# Patient Record
Sex: FEMALE | Race: BLACK OR AFRICAN AMERICAN | NOT HISPANIC OR LATINO | Employment: OTHER | URBAN - METROPOLITAN AREA
[De-identification: names, ages, dates, MRNs, and addresses within clinical notes are randomized per-mention and may not be internally consistent; named-entity substitution may affect disease eponyms.]

---

## 2017-01-31 ENCOUNTER — HOSPITAL ENCOUNTER (OUTPATIENT)
Dept: RADIOLOGY | Facility: HOSPITAL | Age: 62
Discharge: HOME | End: 2017-01-31
Attending: ANESTHESIOLOGY
Payer: MEDICARE

## 2017-01-31 DIAGNOSIS — M53.3 SACROILIAC JOINT PAIN: ICD-10-CM

## 2017-01-31 DIAGNOSIS — M54.17 LUMBOSACRAL RADICULOPATHY: ICD-10-CM

## 2017-01-31 DIAGNOSIS — G81.91 RIGHT HEMIPARESIS: ICD-10-CM

## 2017-01-31 DIAGNOSIS — M47.816 FACET ARTHROPATHY, LUMBAR: ICD-10-CM

## 2017-01-31 PROCEDURE — 72148 MRI LUMBAR SPINE W/O DYE: CPT | Mod: TC,PO

## 2017-02-06 ENCOUNTER — TELEPHONE (OUTPATIENT)
Dept: PAIN MEDICINE | Facility: CLINIC | Age: 62
End: 2017-02-06

## 2017-02-06 DIAGNOSIS — M54.17 LUMBOSACRAL RADICULOPATHY: ICD-10-CM

## 2017-02-06 DIAGNOSIS — M53.3 SACROILIAC JOINT PAIN: Primary | ICD-10-CM

## 2017-02-06 NOTE — TELEPHONE ENCOUNTER
----- Message from Teetee Sabillon sent at 2/6/2017 11:36 AM CST -----  Contact: Nisreen fraire_  1st Request  _  2nd Request  _  3rd Request        Who: Nisreen (patient's daughter)    Why: States her mother is waiting on a call back to go over her MRI results    What Number to Call Back: patient can be reached at [phone]    When to Expect a call back: (Before the end of the day)   -- if call after 3:00 call back will be tomorrow.

## 2017-03-28 ENCOUNTER — TELEPHONE (OUTPATIENT)
Dept: PAIN MEDICINE | Facility: CLINIC | Age: 62
End: 2017-03-28

## 2017-03-28 NOTE — TELEPHONE ENCOUNTER
Patient has an appointment today at 2pm with Meghan BLAKE            ----- Message from Na English sent at 3/28/2017  1:26 PM CDT -----  _x  1st Request  _  2nd Request  _  3rd Request        Who: shama    Why: pt. Would like to know if  have an appt. Sooner than may 22,2017 please call pt. To discuss    What Number to Call Back:[phone]    When to Expect a call back: (Before the end of the day)   -- if the call is after 12:00, the call back will be tomorrow.

## 2017-04-07 ENCOUNTER — TELEPHONE (OUTPATIENT)
Dept: PAIN MEDICINE | Facility: CLINIC | Age: 62
End: 2017-04-07

## 2017-04-07 NOTE — TELEPHONE ENCOUNTER
Spoke with pt daughter Nisreen, appointment made with Dr Chen per request for 04/18/2017          ----- Message from Teetee Sabillon sent at 4/7/2017 11:13 AM CDT -----  Contact: Nisreen (daughter)  _  1st Request  _  2nd Request  _  3rd Request        Who: Nisreen (daughter)    Why: would a call back in regards to scheduling her mom a sooner appt than next avaiable on 5/30    What Number to Call Back: [phone]     When to Expect a call back: (Before the end of the day)   -- if call after 3:00 call back will be tomorrow.

## 2017-04-12 ENCOUNTER — TELEPHONE (OUTPATIENT)
Dept: PAIN MEDICINE | Facility: CLINIC | Age: 62
End: 2017-04-12

## 2017-04-12 NOTE — TELEPHONE ENCOUNTER
Spoke with Atul at [facility] to confirm that pt pain cream would be refilled. Atul stated that he would call the patient to notify of refill     Message from Yuly Lester sent at 4/12/2017 11:00 AM CDT -----  X_  1st Request  _  2nd Request  _  3rd Request        Who: Nisreen(Daughter)    Why: Pt's daughter stating she's out of her pain cream and she's in a lot of pain. She would like to know if the office received a fax that was sent over to have the cream refilled. Please call to discuss.    What Number to Call Back:[phone]    When to Expect a call back: (Before the end of the day)   -- if the call is after 12:00, the call back will be tomorrow.

## 2017-05-30 ENCOUNTER — OFFICE VISIT (OUTPATIENT)
Dept: NEUROLOGY | Facility: CLINIC | Age: 62
End: 2017-05-30
Payer: MEDICARE

## 2017-05-30 VITALS
HEART RATE: 69 BPM | HEIGHT: 64 IN | BODY MASS INDEX: 25.61 KG/M2 | DIASTOLIC BLOOD PRESSURE: 78 MMHG | SYSTOLIC BLOOD PRESSURE: 135 MMHG | WEIGHT: 150 LBS

## 2017-05-30 DIAGNOSIS — Z86.73 HISTORY OF STROKE: Primary | ICD-10-CM

## 2017-05-30 DIAGNOSIS — E78.2 MIXED HYPERLIPIDEMIA: ICD-10-CM

## 2017-05-30 DIAGNOSIS — F17.200 SMOKER: ICD-10-CM

## 2017-05-30 DIAGNOSIS — I10 ESSENTIAL HYPERTENSION: ICD-10-CM

## 2017-05-30 PROCEDURE — 99999 PR PBB SHADOW E&M-EST. PATIENT-LVL III: CPT | Mod: PBBFAC,,, | Performed by: PSYCHIATRY & NEUROLOGY

## 2017-05-30 PROCEDURE — 99214 OFFICE O/P EST MOD 30 MIN: CPT | Mod: S$GLB,,, | Performed by: PSYCHIATRY & NEUROLOGY

## 2017-05-30 NOTE — PROGRESS NOTES
"Vascular Neurology  Clinic Note    Reason For Visit (Chief Complaint): history of stroke in 2014    HPI: 62 y.o. right handed female with stroke in 2014 w/ residual R sided weakness.    Says she is in stroke clinic because in December 2014 she had a stroke. She describes it as sudden R upper and lower extremity weakness, was taken to the hospital , she was "out of it" and doesn't remember what happened next. She says she think the "stroke must have traveled up to her brain". She was not able to walk after that and went to outpatient therapy. Prior to this event she had a TIA before that about 20 years ago and one earlier than that, involving R sided weakness, but resolving in a few days. She has not had any events since this event in 2014.    [facility], states that A1c is "perfect" and cholesterol is well controlled.    Brain Imaging:  CT 2/2017 - mild white matter periventricular disease, no clear lacunar or deep infarct  Vessel Imaging:  None  Cardiac Evaluation:  TTE - 2016 - 55%, nl LA  Other:   None  Relevant Labwork:      I independently viewed the above imaging studies in addition to reviewing the report.  I reviewed the above labwork.    Review of Systems  Msk: negative for muscle pain  Skin: negative for pruritis  Neuro: + for headache, every now and then  All others negative    Past Medical History  Past Medical History:   Diagnosis Date    Anticoagulant long-term use     Diabetes mellitus, type 2     Foot drop     a couple weeks ago    Hyperlipidemia     Hypertension     Stroke 12/23/2015     Family History  No relevant history   Social History  Current Smoker 1-2 cigarettes per day     Medication List with Changes/Refills   Current Medications    ALPRAZOLAM (XANAX) 2 MG TAB    Take 1 tablet (2 mg total) by mouth 2 (two) times daily as needed. Take 1/2 tablet by mouth two times daily as needed.    BACLOFEN (LIORESAL) 10 MG TABLET    Take 1 tablet (10 mg total) by mouth 3 (three) " "times daily.    BD INSULIN PEN NEEDLE UF MINI 31 GAUGE X 3/16" NDLE        CITALOPRAM (CELEXA) 20 MG TABLET    Take 1 tablet (20 mg total) by mouth once daily.    CLOPIDOGREL (PLAVIX) 75 MG TABLET    Take 1 tablet (75 mg total) by mouth once daily.    FUROSEMIDE (LASIX) 40 MG TABLET    Take 40 mg by mouth 2 (two) times daily.    GABAPENTIN (NEURONTIN) 300 MG CAPSULE    Take 2 tablets at night for 7 days, then increase to 3 tablets at night.    HYDROCHLOROTHIAZIDE (HYDRODIURIL) 25 MG TABLET    Take 1 tablet (25 mg total) by mouth once daily.    LANTUS SOLOSTAR 100 UNIT/ML (3 ML) INPN PEN    Inject 40 Units into the skin once daily.    LOSARTAN (COZAAR) 50 MG TABLET    Take 1 tablet (50 mg total) by mouth once daily.    METOPROLOL TARTRATE (LOPRESSOR) 50 MG TABLET    Take 50 mg by mouth once daily.    OXYBUTYNIN (DITROPAN) 5 MG TAB    Take 1 tablet (5 mg total) by mouth 3 (three) times daily.    OXYCODONE-ACETAMINOPHEN (PERCOCET)  MG PER TABLET    Take 1 tablet by mouth 2 (two) times daily.    QVAR 40 MCG/ACTUATION AERO    Take 1 puff by mouth 2 (two) times daily.    SIMVASTATIN (ZOCOR) 40 MG TABLET    Take 1 tablet (40 mg total) by mouth once daily.    TRUETEST TEST STRIPS STRP       Discontinued Medications    GABAPENTIN (NEURONTIN) 300 MG CAPSULE    Take 1 capsule (300 mg total) by mouth 3 (three) times daily.       EXAM  Vital Signs:Blood pressure 135/78, pulse 69, height 5' 4" (1.626 m), weight 68 kg (150 lb).  General: well appearing without discomfort   Mental Status:alert, oriented to person - place - age - month   Language: able to name, repeat, comprehend commands   Cranial Nerves: EOMI, PERRL, no facial asymmetry, tongue to midline, palate midline  Motor: 4/5 hemiparesis of R U/LE  Sensory: intact light touch bilaterally, intact proprioception bilaterally  Coordination: no ataxia on finger-to-nose or heel-to-shin testing; no truncal ataxia  Gait & Stance: normal    NIH Stroke Scale:  Interval: " baseline (upon arrival/admit)  Level of Consciousness: 0 - alert  LOC Questions: 0 - answers both correctly  LOC Commands: 0 - performs both correctly  Best Gaze: 0 - normal  Visual: 0 - no visual loss  Facial Palsy: 1 - minor  Motor Left Arm: 0 - no drift  Motor Right Arm: 1 - drift  Motor Left Le - no drift  Motor Right Le - drift  Limb Ataxia: 0 - absent  Sensory: 0 - normal  Best Language: 0 - no aphasia  Dysarthria: 1 - mild to moderate dysarthria  Extinction and Inattention: 0 - no neglect  NIH Stroke Scale Total: 4        MoCA not performed    ___________________  ASSESSMENT & PLAN    History of stroke  Diagnostic Orders: none  · Secondary stroke prevention: Continue plavix 75 mg  · Continue current statin therapy    · Blood pressure goal <140/90 mmHg   · Stroke Risk Factors Discussed & Addressed: HTN goal BP < 140/90, HLD goal LDL <70, DM goal a1c < 7.0, smoking  · Stroke education administered    MD Nai  Vascular Neurology  Office [phone]  Fax [phone]

## 2017-06-09 ENCOUNTER — OFFICE VISIT (OUTPATIENT)
Dept: CARDIOLOGY | Facility: CLINIC | Age: 62
End: 2017-06-09
Payer: MEDICARE

## 2017-06-09 VITALS
SYSTOLIC BLOOD PRESSURE: 110 MMHG | OXYGEN SATURATION: 95 % | DIASTOLIC BLOOD PRESSURE: 60 MMHG | HEIGHT: 64 IN | HEART RATE: 82 BPM

## 2017-06-09 DIAGNOSIS — G81.91 RIGHT HEMIPARESIS: ICD-10-CM

## 2017-06-09 DIAGNOSIS — F17.200 SMOKER: ICD-10-CM

## 2017-06-09 DIAGNOSIS — E08.00 DIABETES MELLITUS DUE TO UNDERLYING CONDITION WITH HYPEROSMOLARITY WITHOUT COMA, WITH LONG-TERM CURRENT USE OF INSULIN: ICD-10-CM

## 2017-06-09 DIAGNOSIS — E78.2 MIXED HYPERLIPIDEMIA: ICD-10-CM

## 2017-06-09 DIAGNOSIS — R23.4 WOUND ESCHAR OF FOOT: ICD-10-CM

## 2017-06-09 DIAGNOSIS — Z79.4 DIABETES MELLITUS DUE TO UNDERLYING CONDITION WITH HYPEROSMOLARITY WITHOUT COMA, WITH LONG-TERM CURRENT USE OF INSULIN: ICD-10-CM

## 2017-06-09 DIAGNOSIS — Z86.73 HISTORY OF STROKE: ICD-10-CM

## 2017-06-09 DIAGNOSIS — I10 ESSENTIAL HYPERTENSION: Primary | ICD-10-CM

## 2017-06-09 PROCEDURE — 99204 OFFICE O/P NEW MOD 45 MIN: CPT | Mod: S$GLB,,, | Performed by: INTERNAL MEDICINE

## 2017-06-09 RX ORDER — LIDOCAINE AND PRILOCAINE 25; 25 MG/G; MG/G
CREAM TOPICAL
COMMUNITY
Start: 2017-06-08 | End: 2018-07-05

## 2017-06-09 NOTE — PROGRESS NOTES
Subjective:    Patient ID:  Soila Hairston is a 62 y.o. female who presents for evaluation of Consult      HPI  63 y/o female with hx of stroke with residual right sided weakness, HTN, HLD, DM, active tobacco use who presents for evaluation. She denies CP, orthopnea, PND, syncope, palps. She has chronic SOB attributed to smoking. She has right sided weakness and continues to smoke. Has right leg edema that is chronic. Has a lesion on right foot between 4th and 5th toe and is being followed by wound care.     Review of Systems   Constitution: Positive for weakness. Negative for malaise/fatigue.   HENT: Negative for congestion and headaches.    Eyes: Negative for blurred vision.   Cardiovascular: Positive for dyspnea on exertion and leg swelling. Negative for chest pain, claudication, cyanosis, irregular heartbeat, near-syncope, orthopnea, palpitations, paroxysmal nocturnal dyspnea and syncope.   Respiratory: Negative for shortness of breath.    Endocrine: Negative for polyuria.   Hematologic/Lymphatic: Negative for bleeding problem.   Skin: Negative for itching and rash.   Musculoskeletal: Positive for muscle weakness. Negative for joint swelling and muscle cramps.   Gastrointestinal: Negative for abdominal pain, hematemesis, hematochezia, melena, nausea and vomiting.   Genitourinary: Negative for dysuria and hematuria.   Neurological: Positive for disturbances in coordination and focal weakness. Negative for dizziness, light-headedness and loss of balance.   Psychiatric/Behavioral: Negative for depression. The patient is not nervous/anxious.         Objective:    Physical Exam   Constitutional: She is oriented to person, place, and time. She appears well-developed and well-nourished.   HENT:   Head: Normocephalic and atraumatic.   Neck: Neck supple. No JVD present.   Cardiovascular: Normal rate, regular rhythm and normal heart sounds.    Pulses:       Carotid pulses are 2+ on the right side, and 2+ on the left  side.       Radial pulses are 2+ on the right side, and 2+ on the left side.        Femoral pulses are 2+ on the right side, and 2+ on the left side.       Dorsalis pedis pulses are 1+ on the right side, and 2+ on the left side.        Posterior tibial pulses are 2+ on the right side, and 2+ on the left side.   Triphasic RPT doppler  Biphasic RDP doppler   Pulmonary/Chest: Effort normal and breath sounds normal.   Abdominal: Soft. Bowel sounds are normal.   Musculoskeletal: She exhibits edema.   Neurological: She is alert and oriented to person, place, and time.   Skin: Skin is warm and dry.   Psychiatric: She has a normal mood and affect. Her behavior is normal. Thought content normal.         Assessment:       1. Essential hypertension    2. Right hemiparesis    3. Diabetes mellitus due to underlying condition with hyperosmolarity without coma, with long-term current use of insulin    4. Mixed hyperlipidemia    5. History of stroke    6. Smoker    7. Wound eschar of foot        61 y/o female with hx and presentation as above. Unclear as to the reason for the clinic visit and she is unclear also. She has risk factors for CAD and is on appropriate medical management for prevention including anti-HTN meds, plavix, statin, ARB. She smells like cigarettes and we discussed the importance of smoking cessation. Discussed heart healthy diet, med compliance. Pulses evaluated in right foot and palpable and found with doppler.        Plan:       -Continue current medical regimen  -f/u PRN

## 2017-07-13 ENCOUNTER — TELEPHONE (OUTPATIENT)
Dept: ADMINISTRATIVE | Facility: OTHER | Age: 62
End: 2017-07-13

## 2017-07-14 ENCOUNTER — TELEPHONE (OUTPATIENT)
Dept: PAIN MEDICINE | Facility: CLINIC | Age: 62
End: 2017-07-14

## 2017-07-14 NOTE — TELEPHONE ENCOUNTER
Spoke with patient regarding message left staff. Patient stated that she had a message from karen. Patient was informed that, that was a call from [employer] nurse practitioner. Patient verbalized number and stated she will call them.

## 2017-07-14 NOTE — TELEPHONE ENCOUNTER
----- Message from Jacey López sent at 7/14/2017  9:00 AM CDT -----  Contact: Self [phone]  Patient Returning Your Phone Call

## 2017-07-14 NOTE — TELEPHONE ENCOUNTER
----- Message from Johanny Santiago sent at 7/14/2017  8:25 AM CDT -----  Contact: self/[phone]  She is returning the nurse call.

## 2017-08-21 ENCOUNTER — TELEPHONE (OUTPATIENT)
Dept: PAIN MEDICINE | Facility: CLINIC | Age: 62
End: 2017-08-21

## 2017-08-21 NOTE — TELEPHONE ENCOUNTER
Spoke with pt regarding appt tomorrow in [place].  Explained to pt that this appt has been moved to [facility], with CRISS Louie NP for 3PM.  Pt needs to be released from [facility] before coming to [place] with Dr. Dominguez.  Pt stated she would inform her daughter, then hung up.

## 2017-08-22 ENCOUNTER — OFFICE VISIT (OUTPATIENT)
Dept: PAIN MEDICINE | Facility: CLINIC | Age: 62
End: 2017-08-22
Payer: COMMERCIAL

## 2017-08-22 VITALS
SYSTOLIC BLOOD PRESSURE: 132 MMHG | HEART RATE: 70 BPM | TEMPERATURE: 98 F | HEIGHT: 64 IN | WEIGHT: 169.81 LBS | RESPIRATION RATE: 18 BRPM | DIASTOLIC BLOOD PRESSURE: 72 MMHG | BODY MASS INDEX: 28.99 KG/M2

## 2017-08-22 DIAGNOSIS — M51.36 DDD (DEGENERATIVE DISC DISEASE), LUMBAR: ICD-10-CM

## 2017-08-22 DIAGNOSIS — M47.816 FACET ARTHROPATHY, LUMBAR: ICD-10-CM

## 2017-08-22 DIAGNOSIS — M53.3 SACROILIAC JOINT PAIN: ICD-10-CM

## 2017-08-22 DIAGNOSIS — M48.061 LUMBAR STENOSIS: ICD-10-CM

## 2017-08-22 DIAGNOSIS — M54.17 LUMBOSACRAL RADICULOPATHY: Primary | ICD-10-CM

## 2017-08-22 PROCEDURE — 3008F BODY MASS INDEX DOCD: CPT | Mod: S$GLB,,, | Performed by: NURSE PRACTITIONER

## 2017-08-22 PROCEDURE — 99213 OFFICE O/P EST LOW 20 MIN: CPT | Mod: S$GLB,,, | Performed by: NURSE PRACTITIONER

## 2017-08-22 PROCEDURE — 99999 PR PBB SHADOW E&M-EST. PATIENT-LVL III: CPT | Mod: PBBFAC,,, | Performed by: NURSE PRACTITIONER

## 2017-08-22 PROCEDURE — 3078F DIAST BP <80 MM HG: CPT | Mod: S$GLB,,, | Performed by: NURSE PRACTITIONER

## 2017-08-22 PROCEDURE — 3075F SYST BP GE 130 - 139MM HG: CPT | Mod: S$GLB,,, | Performed by: NURSE PRACTITIONER

## 2017-08-22 NOTE — PROGRESS NOTES
Chronic Pain - Established Visit    Referring Physician: No ref. provider found    Chief Complaint:   Chief Complaint   Patient presents with    Low-back Pain        SUBJECTIVE: Disclaimer: This note has been generated using voice-recognition software. There may be typographical errors that have been missed during proof-reading    Interval History 8/22/2017:  The patient returns today for follow up of lower back and left leg pain.  We have not seen the patient since Dec 2016.  She reports that her daughter must come with her and she was busy with a new job.  Her pain radiates down the side of her left leg to her shin and anterior foot.  She denies any buttock or posterior foot pain.  She did have previously ordered lumbar MRI.  She does reports some LE weakness.  She also has a history of stroke in the past with resulting right sided weakness.  Her pain today is 8/10.  The patient denies any bowel or bladder incontinence or signs of saddle paresthesia.      Interval History 12/29/2016:  Patient arrives to clinic for a 2 week follow up after getting a Left TF KATINA L5&S1 on 11/30/16. Patient reports her pain as a 10/10 today. Patient stated that she got 90% relief from the procedure for the first 2-3 weeks andnow that pain is pain as it was before the injection. Pt reports that in the past the KATINA has provided relief for a couple of months.  Currently she is taking Percocet 10-325mg twice daily and Gabapentin 300mg at night to assist with pain. She was taking it in the morning as well but this was making her too tired. Additionally she is using a compound pain cream three times daily which is helping. The nature of her pain has not changed since her initial encounter.     Interval History 10/20/2016:  Patient's pain is unchanged and continues to radiate down the left leg. Outside records were obtained and she seems to get the most benefit from left L5 and S1 TF KATINA (up to 4 months of 75% relief). Pain is unchanged  now as back when she was receiving these injections. Patient does have chronic urinary incontinence since her CVA but no bowel incontinence. Compound cream is very helpful. Still taking percocet 10/325 TWICE DAILY which is also helpful.    Initial encounter:    Soila Hairston presents to the clinic for the evaluation of lumbar  pain. The pain started 3 years ago following a fall and symptoms have been worsening.    Brief history:  Patient has a history of lower back pain and has been seen by pain management outside of this facility and states that she was discharged from one clinic secondary to multi-sourcing opiate's which they were unaware was a violation of her current opiate contract and have left her second pain management physician secondary to wanting a second opinion and trying to avoid surgical intervention and continue doing interventional treatment options.    Pain Description:    The pain is located in the low back area and radiates to the left lower extremity.      At BEST  0/10     At WORST  10/10 on the WORST day.      On average pain is rated as 6/10.     Today the pain is rated as 7/10    The pain is described as aching, burning, numbing, sharp, shooting, throbbing and tingling      Symptoms interfere with daily activity.     Exacerbating factors: Sitting, Standing, Laying, Bending, Touching, Coughing/Sneezing, Eating, Walking, Night Time, Morning, Flexing, Lifting and Getting out of bed/chair.      Mitigating factors medications, rest, sitting and injections.     Patient denies night fever/night sweats, urinary incontinence, bowel incontinence, significant weight loss, significant motor weakness and loss of sensations.  Patient denies any suicidal or homicidal ideations    Pain Medications:  Current:  Celexa 20 mg QD  Gabapentin 300 mg TID    Tried in Past:  NSAIDs - Cannot take due to Plavix  TCA -Never  SNRI - Yes  Anti-convulsants - Gabapentin  Muscle Relaxants  -Never  Opioids-Percocet    Physical Therapy/Home Exercise: no       report:  Reviewed and consistent with medication use as prescribed.    Pain Procedures: [facility]   11/30/16 Left L5 and S1 TF KATINA- significant benefit    Chiropractor -never  Acupuncture - never  TENS unit -never  Spinal decompression -never  Joint replacement -never    Imaging:     Lumbar MRI 1/31/17    Narrative     MRI lumbar spine without contrast.01/31/17 13:19:44    History:   low back pain., left lower extremity radiculopathy and history of right lower extremity weakness secondary to CVA    Standard multiplanar noncontrast MRI sequences of the lumbar spine.    Tip of the conus is located at T12 level.  No abnormal signal in distal cord.  Small spinal canal congenital basis.  Superimposed disc protrusions at the L3-L4, L4-L5 and L5-S1 levels with significant spinal stenosis.    L1-2: Normal.    L2-3: Normal.    L3-4: Marked disc space narrowing.  Circumferential disc bulge.  Moderate central spinal stenosis.  Bilateral bony neural foraminal narrowing without evidence of definite nerve root impingement.  Facet hypertrophy.    L4-5: Marked disc space narrowing.  Circumferential disc bulge.  Moderate to severe central spinal stenosis.  Bilateral recess stenosis.  Disc herniation extends inferior to the disc space.  Bilateral bony neural foraminal narrowing with possible bilateral L4 nerve or impingement.  Bilateral facet hypertrophy.    L5-S1:  Marked disc space narrowing.  Circumferential disc bulge.  Asymmetric disc protrusion paramedian greater to the left side with narrowing of the left lateral recess greater than the right lateral recess and possible left-sided S1 nerve root impingement.  Bilateral bony neural foraminal narrowing with possible bilateral L5 nerve root impingements.   Impression         Disc space narrowing at the L3-L4 level with a circumferential disc bulge and moderate central spinal stenosis with  "bilateral degenerative changes of the facets and bilateral bony neuroforaminal narrowing.  Marked disc space narrowing at the L4-L5 level with broad-based disc protrusion and moderate to severe central spinal stenosis.  Disc herniation inferior to the disc space.  Narrowing of the lateral recesses with possible bilateral L5 nerve root impingement.  Degenerative changes of the facets.  Disc space narrowing at the L5-S1 level with a broad-based disc protrusion/disc herniation paramedian greater to the left side with possible left-sided S1 nerve root impingement.  Bilateral bony neural foraminal narrowing with evidence of possible bilateral L5 nerve root impingement in the neural foramina.         Past Medical History:   Diagnosis Date    Anticoagulant long-term use     Diabetes mellitus, type 2     Foot drop     a couple weeks ago    Hyperlipidemia     Hypertension     Stroke 12/23/2015     History reviewed. No pertinent surgical history.  Social History     Social History    Marital status:      Spouse name: N/A    Number of children: N/A    Years of education: N/A     Occupational History    Not on file.     Social History Main Topics    Smoking status: Current Some Day Smoker    Smokeless tobacco: Not on file    Alcohol use No    Drug use: No    Sexual activity: Not Currently     Other Topics Concern    Not on file     Social History Narrative    No narrative on file     History reviewed. No pertinent family history.    No Known Allergies    Current Outpatient Prescriptions   Medication Sig    alprazolam (XANAX) 2 MG Tab Take 1 tablet (2 mg total) by mouth 2 (two) times daily as needed. Take 1/2 tablet by mouth two times daily as needed.    baclofen (LIORESAL) 10 MG tablet Take 1 tablet (10 mg total) by mouth 3 (three) times daily.    BD INSULIN PEN NEEDLE UF MINI 31 gauge x 3/16" Ndle     citalopram (CELEXA) 20 MG tablet Take 1 tablet (20 mg total) by mouth once daily.    furosemide " (LASIX) 40 MG tablet Take 40 mg by mouth 2 (two) times daily.    gabapentin (NEURONTIN) 300 MG capsule Take 2 tablets at night for 7 days, then increase to 3 tablets at night.    hydrochlorothiazide (HYDRODIURIL) 25 MG tablet Take 1 tablet (25 mg total) by mouth once daily.    LANTUS SOLOSTAR 100 unit/mL (3 mL) InPn pen Inject 40 Units into the skin once daily.    lidocaine-prilocaine (EMLA) cream     losartan (COZAAR) 50 MG tablet Take 1 tablet (50 mg total) by mouth once daily.    metoprolol tartrate (LOPRESSOR) 50 MG tablet Take 50 mg by mouth once daily.    oxybutynin (DITROPAN) 5 MG Tab Take 1 tablet (5 mg total) by mouth 3 (three) times daily.    oxycodone-acetaminophen (PERCOCET)  mg per tablet Take 1 tablet by mouth 2 (two) times daily.    QVAR 40 mcg/actuation Aero Take 1 puff by mouth 2 (two) times daily.    simvastatin (ZOCOR) 40 MG tablet Take 1 tablet (40 mg total) by mouth once daily.    TRUETEST TEST STRIPS Strp     clopidogrel (PLAVIX) 75 mg tablet Take 1 tablet (75 mg total) by mouth once daily.     No current facility-administered medications for this visit.        REVIEW OF SYSTEMS:    GENERAL:  No weight loss, malaise or fevers.  HEENT:   No recent changes in vision or hearing  NECK:  Negative for lumps, no difficulty with swallowing.  RESPIRATORY:  Negative for cough, wheezing or shortness of breath, patient denies any recent URI.  CARDIOVASCULAR:  Negative for chest pain, leg swelling or palpitations.  GI:  Negative for abdominal discomfort, blood in stools or black stools or change in bowel habits.  MUSCULOSKELETAL:  See HPI.  SKIN:  Negative for lesions, rash, and itching.  PSYCH:  No mood disorder or recent psychosocial stressors.  Patients sleep is disturbed secondary to pain.  HEMATOLOGY/LYMPHOLOGY:  Negative for prolonged bleeding, bruising easily or swollen nodes.   Patient on Plavix for stroke prevention  ENDO: IDDM  NEURO:   History of CVA resulting in right-sided  "hemiparesis  All other reviewed and negative other than HPI.    OBJECTIVE:    /72   Pulse 70   Temp 97.5 °F (36.4 °C) (Oral)   Resp 18   Ht 5' 4" (1.626 m)   Wt 77 kg (169 lb 12.8 oz)   BMI 29.15 kg/m²     PHYSICAL EXAMINATION:    GENERAL: Well appearing, in no acute distress, alert and oriented x3.  PSYCH:  Mood and affect appropriate.  SKIN:  No rashes or abrasions.  HEAD/FACE:  Normocephalic, atraumatic.   CV: RRR with palpation of the radial artery.  PULM: No evidence of respiratory difficulty, symmetric chest rise.  BACK:  SLR is positive at left L4 distribution seated at 30 degrees.  There is pain with palpation over the facet joints of the lumbar spine bilaterally. There is decreased range of motion with extension to 15 degrees, and facet loading maneuvers cause reproducible pain.    EXTREMITIES: Peripheral joint ROM is full and pain free without obvious instability or laxity in all four extremities. No deformities, edema, or skin discoloration. Good capillary refill.  MUSCULOSKELETAL:    There is pain with palpation over the sacroiliac joints bilaterally.  There is pain to palpation over the greater trochanteric bursa bilaterally.  Right-sided weakness secondary to previous stroke.  5/5 strength in left ankle with plantar and 4/5 with dorsiflexion.   NEURO:   No loss of sensation is noted.  GAIT: Antalgic, patient brought in wheelchair, has right-sided weakness since CVA.    Lab Results   Component Value Date    CREATININE 1.0 03/04/2016      ASSESSMENT: 62 y.o. year old female with pain, consistent with     Encounter Diagnoses   Name Primary?    Lumbosacral radiculopathy Yes    Sacroiliac joint pain     Facet arthropathy, lumbar     DDD (degenerative disc disease), lumbar     Lumbar stenosis        PLAN:     - Previous imaging was reviewed and discussed with the patient today.    - Will scheduled for left L4 and L5 TF KATINA given current symptoms and exam.  The procedure, risks, benefits and " options were discussed with patient. There are no contraindications to the procedure. The patient expressed understanding and agreed to proceed.  Consent obtained today.    - We discussed lumbar SCS trial.  I provided her with a [employer] (formerly [employer]) DVD.    - She also inquired about surgical consult but would like to wait until after KATINA.    - Continue Gabapentin 300 mg TID.    - Dr. Chen was consulted on the patient and agrees with this plan.    - RTC 2 weeks after procedure.      The above plan and management options were discussed at length with patient. Patient is in agreement with the above and verbalized understanding.     Meghan Louie  08/22/2017

## 2017-09-13 ENCOUNTER — HOSPITAL ENCOUNTER (OUTPATIENT)
Facility: OTHER | Age: 62
Discharge: HOME | End: 2017-09-13
Attending: ANESTHESIOLOGY | Admitting: ANESTHESIOLOGY
Payer: COMMERCIAL

## 2017-09-13 ENCOUNTER — SURGERY (OUTPATIENT)
Age: 62
End: 2017-09-13

## 2017-09-13 ENCOUNTER — TELEPHONE (OUTPATIENT)
Dept: PAIN MEDICINE | Facility: CLINIC | Age: 62
End: 2017-09-13

## 2017-09-13 VITALS
RESPIRATION RATE: 18 BRPM | TEMPERATURE: 98 F | HEART RATE: 73 BPM | SYSTOLIC BLOOD PRESSURE: 137 MMHG | DIASTOLIC BLOOD PRESSURE: 73 MMHG | OXYGEN SATURATION: 95 %

## 2017-09-13 DIAGNOSIS — M54.17 LUMBOSACRAL RADICULOPATHY: Primary | ICD-10-CM

## 2017-09-13 LAB — POCT GLUCOSE: 75 MG/DL (ref 70–110)

## 2017-09-13 PROCEDURE — 99152 MOD SED SAME PHYS/QHP 5/>YRS: CPT | Mod: ,,, | Performed by: ANESTHESIOLOGY

## 2017-09-13 PROCEDURE — 25500020 PHARM REV CODE 255: Performed by: ANESTHESIOLOGY

## 2017-09-13 PROCEDURE — 63600175 PHARM REV CODE 636 W HCPCS: Performed by: ANESTHESIOLOGY

## 2017-09-13 PROCEDURE — 25000003 PHARM REV CODE 250: Performed by: ANESTHESIOLOGY

## 2017-09-13 PROCEDURE — 64484 NJX AA&/STRD TFRM EPI L/S EA: CPT | Performed by: ANESTHESIOLOGY

## 2017-09-13 PROCEDURE — 64484 NJX AA&/STRD TFRM EPI L/S EA: CPT | Mod: LT,,, | Performed by: ANESTHESIOLOGY

## 2017-09-13 PROCEDURE — 64483 NJX AA&/STRD TFRM EPI L/S 1: CPT | Mod: LT,,, | Performed by: ANESTHESIOLOGY

## 2017-09-13 PROCEDURE — 64483 NJX AA&/STRD TFRM EPI L/S 1: CPT | Performed by: ANESTHESIOLOGY

## 2017-09-13 RX ORDER — LIDOCAINE HYDROCHLORIDE 10 MG/ML
INJECTION INFILTRATION; PERINEURAL
Status: DISCONTINUED | OUTPATIENT
Start: 2017-09-13 | End: 2017-09-13 | Stop reason: HOSPADM

## 2017-09-13 RX ORDER — BUPIVACAINE HYDROCHLORIDE 2.5 MG/ML
INJECTION, SOLUTION EPIDURAL; INFILTRATION; INTRACAUDAL
Status: DISCONTINUED | OUTPATIENT
Start: 2017-09-13 | End: 2017-09-13 | Stop reason: HOSPADM

## 2017-09-13 RX ORDER — METHYLPREDNISOLONE ACETATE 40 MG/ML
INJECTION, SUSPENSION INTRA-ARTICULAR; INTRALESIONAL; INTRAMUSCULAR; SOFT TISSUE
Status: DISCONTINUED | OUTPATIENT
Start: 2017-09-13 | End: 2017-09-13 | Stop reason: HOSPADM

## 2017-09-13 RX ORDER — MIDAZOLAM HYDROCHLORIDE 1 MG/ML
INJECTION INTRAMUSCULAR; INTRAVENOUS
Status: DISCONTINUED | OUTPATIENT
Start: 2017-09-13 | End: 2017-09-13 | Stop reason: HOSPADM

## 2017-09-13 RX ORDER — SODIUM CHLORIDE 9 MG/ML
INJECTION, SOLUTION INTRAVENOUS CONTINUOUS
Status: DISCONTINUED | OUTPATIENT
Start: 2017-09-13 | End: 2017-09-13 | Stop reason: HOSPADM

## 2017-09-13 RX ADMIN — BUPIVACAINE HYDROCHLORIDE 10 ML: 2.5 INJECTION, SOLUTION EPIDURAL; INFILTRATION; INTRACAUDAL; PERINEURAL at 02:09

## 2017-09-13 RX ADMIN — MIDAZOLAM HYDROCHLORIDE 1 MG: 1 INJECTION, SOLUTION INTRAMUSCULAR; INTRAVENOUS at 02:09

## 2017-09-13 RX ADMIN — IOHEXOL 50 ML: 300 INJECTION, SOLUTION INTRAVENOUS at 02:09

## 2017-09-13 RX ADMIN — SODIUM CHLORIDE: 900 INJECTION, SOLUTION INTRAVENOUS at 01:09

## 2017-09-13 RX ADMIN — METHYLPREDNISOLONE ACETATE 80 MG: 40 INJECTION, SUSPENSION INTRA-ARTICULAR; INTRALESIONAL; INTRAMUSCULAR; SOFT TISSUE at 02:09

## 2017-09-13 RX ADMIN — LIDOCAINE HYDROCHLORIDE 10 ML: 10 INJECTION, SOLUTION INFILTRATION; PERINEURAL at 02:09

## 2017-09-13 NOTE — TELEPHONE ENCOUNTER
Staff contacted procedure area nurses and spoke with Tamara who stated that she would contact the patient's daughter

## 2017-09-13 NOTE — DISCHARGE SUMMARY
Discharge Note  Short Stay      SUMMARY     Admit Date: 9/13/2017    Attending Physician: Latisha Chen    Discharge Diagnosis: Lumbar radiculopathy [M54.16]    Discharge Physician: Latisha Chen      Discharge Date: 9/13/2017 3:41 PM       PROCEDURE:    1)  Left  L4 TRANSFORAMINAL EPIDURAL STEROID INJECTION    2)  Left  L5 TRANSFORAMINAL EPIDURAL STEROID INJECTION    Pre Procedure diagnosis:    Left  L4 and L5  Lumbar radiculopathy [M54.16]    Disposition: Home or self care    Patient Instructions:   Discharge Medication List as of 9/13/2017  2:43 PM      CONTINUE these medications which have NOT CHANGED    Details   gabapentin (NEURONTIN) 300 MG capsule Take 2 tablets at night for 7 days, then increase to 3 tablets at night., Print      hydrochlorothiazide (HYDRODIURIL) 25 MG tablet Take 1 tablet (25 mg total) by mouth once daily., Starting 5/10/2016, Until Discontinued, Normal      LANTUS SOLOSTAR 100 unit/mL (3 mL) InPn pen Inject 40 Units into the skin once daily., Starting 5/10/2016, Until Discontinued, Normal      losartan (COZAAR) 50 MG tablet Take 1 tablet (50 mg total) by mouth once daily., Starting 5/10/2016, Until Discontinued, Normal      metoprolol tartrate (LOPRESSOR) 50 MG tablet Take 50 mg by mouth once daily., Until Discontinued, Historical Med      oxybutynin (DITROPAN) 5 MG Tab Take 1 tablet (5 mg total) by mouth 3 (three) times daily., Starting 5/10/2016, Until Discontinued, Normal      oxycodone-acetaminophen (PERCOCET)  mg per tablet Take 1 tablet by mouth 2 (two) times daily., Starting 5/5/2016, Until Discontinued, Historical Med      QVAR 40 mcg/actuation Aero Take 1 puff by mouth 2 (two) times daily., Starting 2/24/2016, Until Discontinued, Historical Med      simvastatin (ZOCOR) 40 MG tablet Take 1 tablet (40 mg total) by mouth once daily., Starting 5/10/2016, Until Discontinued, Normal      alprazolam (XANAX) 2 MG Tab Take 1 tablet (2 mg total) by mouth 2 (two) times daily as  "needed. Take 1/2 tablet by mouth two times daily as needed., Starting 5/10/2016, Until Discontinued, Print      baclofen (LIORESAL) 10 MG tablet Take 1 tablet (10 mg total) by mouth 3 (three) times daily., Starting 5/10/2016, Until Discontinued, Normal      BD INSULIN PEN NEEDLE UF MINI 31 gauge x 3/16" Ndle Starting 2/19/2016, Until Discontinued, Historical Med      citalopram (CELEXA) 20 MG tablet Take 1 tablet (20 mg total) by mouth once daily., Starting 5/10/2016, Until Discontinued, Normal      clopidogrel (PLAVIX) 75 mg tablet Take 1 tablet (75 mg total) by mouth once daily., Starting Tue 5/10/2016, Until Tue 5/30/2017, Normal      furosemide (LASIX) 40 MG tablet Take 40 mg by mouth 2 (two) times daily., Until Discontinued, Historical Med      lidocaine-prilocaine (EMLA) cream Starting Thu 6/8/2017, Historical Med      TRUETEST TEST STRIPS Strp Starting 2/19/2016, Until Discontinued, Historical Med             Resume home diet and activity    "

## 2017-09-13 NOTE — H&P (VIEW-ONLY)
Chronic Pain - Established Visit    Referring Physician: No ref. provider found    Chief Complaint:   Chief Complaint   Patient presents with    Low-back Pain        SUBJECTIVE: Disclaimer: This note has been generated using voice-recognition software. There may be typographical errors that have been missed during proof-reading    Interval History 8/22/2017:  The patient returns today for follow up of lower back and left leg pain.  We have not seen the patient since Dec 2016.  She reports that her daughter must come with her and she was busy with a new job.  Her pain radiates down the side of her left leg to her shin and anterior foot.  She denies any buttock or posterior foot pain.  She did have previously ordered lumbar MRI.  She does reports some LE weakness.  She also has a history of stroke in the past with resulting right sided weakness.  Her pain today is 8/10.  The patient denies any bowel or bladder incontinence or signs of saddle paresthesia.      Interval History 12/29/2016:  Patient arrives to clinic for a 2 week follow up after getting a Left TF KATINA L5&S1 on 11/30/16. Patient reports her pain as a 10/10 today. Patient stated that she got 90% relief from the procedure for the first 2-3 weeks andnow that pain is pain as it was before the injection. Pt reports that in the past the KATINA has provided relief for a couple of months.  Currently she is taking Percocet 10-325mg twice daily and Gabapentin 300mg at night to assist with pain. She was taking it in the morning as well but this was making her too tired. Additionally she is using a compound pain cream three times daily which is helping. The nature of her pain has not changed since her initial encounter.     Interval History 10/20/2016:  Patient's pain is unchanged and continues to radiate down the left leg. Outside records were obtained and she seems to get the most benefit from left L5 and S1 TF KATINA (up to 4 months of 75% relief). Pain is unchanged  now as back when she was receiving these injections. Patient does have chronic urinary incontinence since her CVA but no bowel incontinence. Compound cream is very helpful. Still taking percocet 10/325 TWICE DAILY which is also helpful.    Initial encounter:    Soila Hairston presents to the clinic for the evaluation of lumbar  pain. The pain started 3 years ago following a fall and symptoms have been worsening.    Brief history:  Patient has a history of lower back pain and has been seen by pain management outside of this facility and states that she was discharged from one clinic secondary to multi-sourcing opiate's which they were unaware was a violation of her current opiate contract and have left her second pain management physician secondary to wanting a second opinion and trying to avoid surgical intervention and continue doing interventional treatment options.    Pain Description:    The pain is located in the low back area and radiates to the left lower extremity.      At BEST  0/10     At WORST  10/10 on the WORST day.      On average pain is rated as 6/10.     Today the pain is rated as 7/10    The pain is described as aching, burning, numbing, sharp, shooting, throbbing and tingling      Symptoms interfere with daily activity.     Exacerbating factors: Sitting, Standing, Laying, Bending, Touching, Coughing/Sneezing, Eating, Walking, Night Time, Morning, Flexing, Lifting and Getting out of bed/chair.      Mitigating factors medications, rest, sitting and injections.     Patient denies night fever/night sweats, urinary incontinence, bowel incontinence, significant weight loss, significant motor weakness and loss of sensations.  Patient denies any suicidal or homicidal ideations    Pain Medications:  Current:  Celexa 20 mg QD  Gabapentin 300 mg TID    Tried in Past:  NSAIDs - Cannot take due to Plavix  TCA -Never  SNRI - Yes  Anti-convulsants - Gabapentin  Muscle Relaxants  -Never  Opioids-Percocet    Physical Therapy/Home Exercise: no       report:  Reviewed and consistent with medication use as prescribed.    Pain Procedures: [facility]   11/30/16 Left L5 and S1 TF KATINA- significant benefit    Chiropractor -never  Acupuncture - never  TENS unit -never  Spinal decompression -never  Joint replacement -never    Imaging:     Lumbar MRI 1/31/17    Narrative     MRI lumbar spine without contrast.01/31/17 13:19:44    History:   low back pain., left lower extremity radiculopathy and history of right lower extremity weakness secondary to CVA    Standard multiplanar noncontrast MRI sequences of the lumbar spine.    Tip of the conus is located at T12 level.  No abnormal signal in distal cord.  Small spinal canal congenital basis.  Superimposed disc protrusions at the L3-L4, L4-L5 and L5-S1 levels with significant spinal stenosis.    L1-2: Normal.    L2-3: Normal.    L3-4: Marked disc space narrowing.  Circumferential disc bulge.  Moderate central spinal stenosis.  Bilateral bony neural foraminal narrowing without evidence of definite nerve root impingement.  Facet hypertrophy.    L4-5: Marked disc space narrowing.  Circumferential disc bulge.  Moderate to severe central spinal stenosis.  Bilateral recess stenosis.  Disc herniation extends inferior to the disc space.  Bilateral bony neural foraminal narrowing with possible bilateral L4 nerve or impingement.  Bilateral facet hypertrophy.    L5-S1:  Marked disc space narrowing.  Circumferential disc bulge.  Asymmetric disc protrusion paramedian greater to the left side with narrowing of the left lateral recess greater than the right lateral recess and possible left-sided S1 nerve root impingement.  Bilateral bony neural foraminal narrowing with possible bilateral L5 nerve root impingements.   Impression         Disc space narrowing at the L3-L4 level with a circumferential disc bulge and moderate central spinal stenosis with  "bilateral degenerative changes of the facets and bilateral bony neuroforaminal narrowing.  Marked disc space narrowing at the L4-L5 level with broad-based disc protrusion and moderate to severe central spinal stenosis.  Disc herniation inferior to the disc space.  Narrowing of the lateral recesses with possible bilateral L5 nerve root impingement.  Degenerative changes of the facets.  Disc space narrowing at the L5-S1 level with a broad-based disc protrusion/disc herniation paramedian greater to the left side with possible left-sided S1 nerve root impingement.  Bilateral bony neural foraminal narrowing with evidence of possible bilateral L5 nerve root impingement in the neural foramina.         Past Medical History:   Diagnosis Date    Anticoagulant long-term use     Diabetes mellitus, type 2     Foot drop     a couple weeks ago    Hyperlipidemia     Hypertension     Stroke 12/23/2015     History reviewed. No pertinent surgical history.  Social History     Social History    Marital status:      Spouse name: N/A    Number of children: N/A    Years of education: N/A     Occupational History    Not on file.     Social History Main Topics    Smoking status: Current Some Day Smoker    Smokeless tobacco: Not on file    Alcohol use No    Drug use: No    Sexual activity: Not Currently     Other Topics Concern    Not on file     Social History Narrative    No narrative on file     History reviewed. No pertinent family history.    No Known Allergies    Current Outpatient Prescriptions   Medication Sig    alprazolam (XANAX) 2 MG Tab Take 1 tablet (2 mg total) by mouth 2 (two) times daily as needed. Take 1/2 tablet by mouth two times daily as needed.    baclofen (LIORESAL) 10 MG tablet Take 1 tablet (10 mg total) by mouth 3 (three) times daily.    BD INSULIN PEN NEEDLE UF MINI 31 gauge x 3/16" Ndle     citalopram (CELEXA) 20 MG tablet Take 1 tablet (20 mg total) by mouth once daily.    furosemide " (LASIX) 40 MG tablet Take 40 mg by mouth 2 (two) times daily.    gabapentin (NEURONTIN) 300 MG capsule Take 2 tablets at night for 7 days, then increase to 3 tablets at night.    hydrochlorothiazide (HYDRODIURIL) 25 MG tablet Take 1 tablet (25 mg total) by mouth once daily.    LANTUS SOLOSTAR 100 unit/mL (3 mL) InPn pen Inject 40 Units into the skin once daily.    lidocaine-prilocaine (EMLA) cream     losartan (COZAAR) 50 MG tablet Take 1 tablet (50 mg total) by mouth once daily.    metoprolol tartrate (LOPRESSOR) 50 MG tablet Take 50 mg by mouth once daily.    oxybutynin (DITROPAN) 5 MG Tab Take 1 tablet (5 mg total) by mouth 3 (three) times daily.    oxycodone-acetaminophen (PERCOCET)  mg per tablet Take 1 tablet by mouth 2 (two) times daily.    QVAR 40 mcg/actuation Aero Take 1 puff by mouth 2 (two) times daily.    simvastatin (ZOCOR) 40 MG tablet Take 1 tablet (40 mg total) by mouth once daily.    TRUETEST TEST STRIPS Strp     clopidogrel (PLAVIX) 75 mg tablet Take 1 tablet (75 mg total) by mouth once daily.     No current facility-administered medications for this visit.        REVIEW OF SYSTEMS:    GENERAL:  No weight loss, malaise or fevers.  HEENT:   No recent changes in vision or hearing  NECK:  Negative for lumps, no difficulty with swallowing.  RESPIRATORY:  Negative for cough, wheezing or shortness of breath, patient denies any recent URI.  CARDIOVASCULAR:  Negative for chest pain, leg swelling or palpitations.  GI:  Negative for abdominal discomfort, blood in stools or black stools or change in bowel habits.  MUSCULOSKELETAL:  See HPI.  SKIN:  Negative for lesions, rash, and itching.  PSYCH:  No mood disorder or recent psychosocial stressors.  Patients sleep is disturbed secondary to pain.  HEMATOLOGY/LYMPHOLOGY:  Negative for prolonged bleeding, bruising easily or swollen nodes.   Patient on Plavix for stroke prevention  ENDO: IDDM  NEURO:   History of CVA resulting in right-sided  "hemiparesis  All other reviewed and negative other than HPI.    OBJECTIVE:    /72   Pulse 70   Temp 97.5 °F (36.4 °C) (Oral)   Resp 18   Ht 5' 4" (1.626 m)   Wt 77 kg (169 lb 12.8 oz)   BMI 29.15 kg/m²     PHYSICAL EXAMINATION:    GENERAL: Well appearing, in no acute distress, alert and oriented x3.  PSYCH:  Mood and affect appropriate.  SKIN:  No rashes or abrasions.  HEAD/FACE:  Normocephalic, atraumatic.   CV: RRR with palpation of the radial artery.  PULM: No evidence of respiratory difficulty, symmetric chest rise.  BACK:  SLR is positive at left L4 distribution seated at 30 degrees.  There is pain with palpation over the facet joints of the lumbar spine bilaterally. There is decreased range of motion with extension to 15 degrees, and facet loading maneuvers cause reproducible pain.    EXTREMITIES: Peripheral joint ROM is full and pain free without obvious instability or laxity in all four extremities. No deformities, edema, or skin discoloration. Good capillary refill.  MUSCULOSKELETAL:    There is pain with palpation over the sacroiliac joints bilaterally.  There is pain to palpation over the greater trochanteric bursa bilaterally.  Right-sided weakness secondary to previous stroke.  5/5 strength in left ankle with plantar and 4/5 with dorsiflexion.   NEURO:   No loss of sensation is noted.  GAIT: Antalgic, patient brought in wheelchair, has right-sided weakness since CVA.    Lab Results   Component Value Date    CREATININE 1.0 03/04/2016      ASSESSMENT: 62 y.o. year old female with pain, consistent with     Encounter Diagnoses   Name Primary?    Lumbosacral radiculopathy Yes    Sacroiliac joint pain     Facet arthropathy, lumbar     DDD (degenerative disc disease), lumbar     Lumbar stenosis        PLAN:     - Previous imaging was reviewed and discussed with the patient today.    - Will scheduled for left L4 and L5 TF KATINA given current symptoms and exam.  The procedure, risks, benefits and " options were discussed with patient. There are no contraindications to the procedure. The patient expressed understanding and agreed to proceed.  Consent obtained today.    - We discussed lumbar SCS trial.  I provided her with a MediWound DVD.    - She also inquired about surgical consult but would like to wait until after KATINA.    - Continue Gabapentin 300 mg TID.    - Dr. Chen was consulted on the patient and agrees with this plan.    - RTC 2 weeks after procedure.      The above plan and management options were discussed at length with patient. Patient is in agreement with the above and verbalized understanding.     Meghan Louie  08/22/2017

## 2017-09-13 NOTE — TELEPHONE ENCOUNTER
----- Message from Shavon Neff sent at 9/13/2017  8:34 AM CDT -----  Contact: pt's bianka daughter [phone]  _  1st Request  _  2nd Request  _  3rd Request        Who: pt's bianka daughter [phone]    Why: the daughter has questions regarding what medications the pt can take before injection today. Please call daughter because the pt's sugar is low.    What Number to Call Back:pt's bianka daughter [phone]    When to Expect a call back: (With in 24 hours)

## 2017-09-13 NOTE — DISCHARGE INSTRUCTIONS
Home Care Instructions Pain Management:    1. DIET:   You may resume your normal diet today.   2. BATHING:   You may shower with luke warm water.  3. DRESSING:   You may remove your bandage today.   4. ACTIVITY LEVEL:   You may resume your normal activities 24 hrs after your procedure.  5. MEDICATIONS:   You may resume your normal medications today.   6. SPECIAL INSTRUCTIONS:   No heat to the injection site for 24 hrs including, bath or shower, heating pad, moist heat, or hot tubs.    Use ice pack to injection site for any pain or discomfort.  Apply ice packs for 20 minute intervals as needed.   If you have received any sedatives by mouth today you may not drive for 12 hours.    If you have received any sedation through your IV, you may not drive for 24 hrs.     PLEASE CALL YOUR DOCTOR IF:  1. Redness or swelling around the injection site.  2. Fever of 101 degrees  3. Drainage (pus) from the injection site.  4. For any continuous bleeding (some dried blood over the incision is normal.)    FOR EMERGENCIES:   If any unusual problems or difficulties occur during clinic hours, call [phone] or [phone].   Procedural Sedation  Procedural sedation is medicine to ease discomfort, pain, and anxiety during a procedure. The medicine is often given through an intravenous (IV) line in your arm or hand. In some cases, the medicine may be taken by mouth or inhaled. While you are under sedation, you will likely be awake. But you may not remember anything afterward.  Why procedural sedation is used  Sedation is used for many types of procedures. The goal is to reduce pain, anxiety, and stressful memories of a procedure. It can also help your health care provider treat you. For example, having a broken bone fixed may be easier if you feel relaxed.  Procedural sedation is used only for short, basic procedures. It is not used for complex surgeries. Some procedures that use this type of sedation include:  · Dental surgery  · Breast  biopsy, to take a sample of breast tissue  · Endoscopy, to look at gastrointestinal problems  · Bronchoscopy, to check for lung problems  · Bone or joint realignment, to fix a broken bone or dislocated joint  · Minor foot or skin surgery  · Electrical cardioversion, to restore a normal heart rhythm  · Lumbar puncture, to assess neurological disease  Risks of procedural sedation  Procedural sedation has some risks and possible side effects, such as:  · Headache  · Nausea and vomiting  · Unpleasant memory of the procedure  · Lowered rate of breathing  · Changes in heart rate and blood pressure (rare)  · Inhalation of stomach contents into your lungs (rare)  Side effects will likely go away shortly after the procedure. Your health care team will watch your heart rate and breathing during your sedation. This is to help prevent problems.  Your own risks may vary based on your age and your overall health. They also depend on the type of sedation you are given. Talk with your health care provider about the risks that apply most to you.  Getting ready for procedural sedation  Talk with your health care provider how to get ready for your procedure. Tell him or her about all the medicines you take. This includes over-the-counter medicines such as ibuprofen. It also includes vitamins, herbs, and other supplements. You may need to stop taking some medicines before the procedure, such as blood thinners and aspirin. If you smoke, you may need to stop. Talk with your health care provider if you need help to stop smoking.  Tell your health care provider if you:  · Have had any problems in the past with sedation or anesthesia  · Have had any recent changes in your health, such as an infection or fever  · Are pregnant or think you may be pregnant  Also, make sure to:  · Ask a family member or friend to take you home after the procedure. You cannot drive on the day you receive sedation.  · Not eat or drink after midnight the night  before your procedure, if advised.  · Follow all other instructions from your health care provider.  During your procedural sedation  You may have your procedure in a hospital or a medical clinic. Sedation is done by a trained health care provider. In general, you can expect the following:  · You will be given medicine through an IV line in your arm or hand. Or you may receive a shot. The medicine may also be given by mouth. Or you may inhale it through a mask.  · If you receive medicine through an IV, you may feel the effects very quickly. You will start to feel relaxed and drowsy.  · During the procedure, your heart rate, breathing, and blood pressure will be closely watched. Your breathing and blood pressure may decrease a little. But you will likely not need help with your breathing. You may receive a little extra oxygen through a mask.  · You will probably be awake the entire time. If you do fall asleep, you should be easy to wake up, if needed. You should feel little or no pain.  · When your procedure is over, the sedative medicine will be stopped.  After your procedural sedation  You will begin to feel more awake and aware. But you will likely be drowsy for a while afterward. You will be closely watched as you become more alert. You may have a faint memory of the procedure. Or you may not remember it at all.  You should be able to return home within an hour or two after your procedure. Plan to have someone stay with you for a few hours. Side effects such as headache and nausea may go away quickly. Tell your health care provider if they continue.  Dont drive or make any important decisions for at least 24 hours. Be sure to follow all after-care instructions.      When to call your health care provider  Have someone call your health care provider right away if you have any of these:  · Drowsiness that gets worse  · Weakness or dizziness that gets worse  · Repeated vomiting  · You cant be awakened   Date Last  Reviewed: 2/6/2015  © 5394-7263 [employer] [address] [place] [address], [place] [address]. All rights reserved. This information is not intended as a substitute for professional medical care. Always follow your healthcare professional's instructions.

## 2017-09-13 NOTE — OP NOTE
INFORMED CONSENT: The procedure, risks, benefits and options were discussed with patient. There are no contraindications to the procedure. The patient expressed understanding and agreed to proceed. The personnel performing the procedure was discussed.    09/13/2017    Surgeon: Latisha Chen MD    Assistants: None    PROCEDURE:    1)  Left  L4 TRANSFORAMINAL EPIDURAL STEROID INJECTION    2)  Left  L5 TRANSFORAMINAL EPIDURAL STEROID INJECTION    Pre Procedure diagnosis:    Left  L4 and L5  Lumbar radiculopathy [M54.16]    Post-Procedure diagnosis:   same    Complications: None    Specimens: None      DESCRIPTION OF PROCEDURE: The patient was brought to the procedure room. IV access was obtained prior to the procedure. The patient was positioned prone on the fluoroscopy table. Continuous hemodynamic monitoring was initiated including blood pressure, EKG, and pulse oximetry. . The skin was prepped with chlorhexidine and draped in a sterile fashion. Skin anesthesia was achieved using a total of 10mL of lidocaine, 5mL over each respective injection site.     The  L4 and L5  transforaminal spaces were identified with fluoroscopy in the  AP, oblique, and lateral views.  A 22 gauge spinal quinke needle was then advanced into the area of the trans foraminal spaces bilaterally with confirmation of proper needle position using AP, oblique, and lateral fluoroscopic views. Once the needle tip was in the area of the transforaminal space, and there was no blood, CSF or paraesthesias,  1.5 mL of Omnipaque 300mg/ml was injected on each side for a total of 3mL.  Fluoroscopic imaging in the AP and lateral views revealed a clear outline of the spinal nerve with proximal spread of agent through the neural foramen into the epidural space. A total combination of 1 mL of Bupivicaine 0.25% and 40 mg depo medrol was injected on each side for a total of 4mL of injected medications with displacement of the contrast dye confirming that the  medication went into the area of the transforaminal spaces bilaterally. A sterile dressing was applied.   Patient tolerated the procedure well.    Conscious sedation provided by M.D    The patient was monitored with continuous pulse oximetry, EKG, and intermittent blood pressure monitors.  The patient was hemodynamically stable throughout the entire process was responsive to voice, and breathing spontaneously.  Supplemental O2 was provided at 2L/min via nasal cannula.  Patient was comfortable for the duration of the procedure. (See nurse documentation and case log for sedation time)    There was a total of 1 mg IV Midazolam was titrated for the procedure    Patient was taken back to the recovery room for further observation.     The patient was discharged to home in stable condition

## 2017-09-28 DIAGNOSIS — R05.9 COUGH: Primary | ICD-10-CM

## 2017-10-02 ENCOUNTER — OFFICE VISIT (OUTPATIENT)
Dept: PAIN MEDICINE | Facility: CLINIC | Age: 62
End: 2017-10-02
Payer: MEDICARE

## 2017-10-02 VITALS
OXYGEN SATURATION: 99 % | DIASTOLIC BLOOD PRESSURE: 72 MMHG | HEIGHT: 64 IN | WEIGHT: 171.94 LBS | RESPIRATION RATE: 18 BRPM | BODY MASS INDEX: 29.35 KG/M2 | TEMPERATURE: 99 F | HEART RATE: 76 BPM | SYSTOLIC BLOOD PRESSURE: 123 MMHG

## 2017-10-02 DIAGNOSIS — M79.641 PAIN IN RIGHT HAND: ICD-10-CM

## 2017-10-02 DIAGNOSIS — M47.816 FACET ARTHROPATHY, LUMBAR: ICD-10-CM

## 2017-10-02 DIAGNOSIS — M51.36 DDD (DEGENERATIVE DISC DISEASE), LUMBAR: ICD-10-CM

## 2017-10-02 DIAGNOSIS — M53.3 SACROILIAC JOINT PAIN: ICD-10-CM

## 2017-10-02 DIAGNOSIS — M54.17 LUMBOSACRAL RADICULOPATHY: Primary | ICD-10-CM

## 2017-10-02 PROCEDURE — 3074F SYST BP LT 130 MM HG: CPT | Mod: S$GLB,,, | Performed by: NURSE PRACTITIONER

## 2017-10-02 PROCEDURE — 3008F BODY MASS INDEX DOCD: CPT | Mod: S$GLB,,, | Performed by: NURSE PRACTITIONER

## 2017-10-02 PROCEDURE — 3078F DIAST BP <80 MM HG: CPT | Mod: S$GLB,,, | Performed by: NURSE PRACTITIONER

## 2017-10-02 PROCEDURE — 99999 PR PBB SHADOW E&M-EST. PATIENT-LVL IV: CPT | Mod: PBBFAC,,, | Performed by: NURSE PRACTITIONER

## 2017-10-02 PROCEDURE — 99213 OFFICE O/P EST LOW 20 MIN: CPT | Mod: S$GLB,,, | Performed by: NURSE PRACTITIONER

## 2017-10-02 NOTE — PROGRESS NOTES
Chronic Pain - Established Visit    Referring Physician: No ref. provider found    Chief Complaint:   Chief Complaint   Patient presents with    Low-back Pain     3 week follow up after procedure        SUBJECTIVE: Disclaimer: This note has been generated using voice-recognition software. There may be typographical errors that have been missed during proof-reading    Interval History 10/2/2017:  The patient presents today for follow up of lower back pain.  She is s/p left L4 and L5 TF KATINA with 90% pain relief.  She states that this is the best relief she has received from a procedure.  She has been able to stand and walk better since the procedure.  She is trying to get what sounds like financial assistance for physical therapy.  Her daughter is with her today and states that she notices improvement in her symptoms.  Her pain today is 0/10.      Interval History 8/22/2017:  The patient returns today for follow up of lower back and left leg pain.  We have not seen the patient since Dec 2016.  She reports that her daughter must come with her and she was busy with a new job.  Her pain radiates down the side of her left leg to her shin and anterior foot.  She denies any buttock or posterior foot pain.  She did have previously ordered lumbar MRI.  She does reports some LE weakness.  She also has a history of stroke in the past with resulting right sided weakness.  Her pain today is 8/10.  The patient denies any bowel or bladder incontinence or signs of saddle paresthesia.      Interval History 12/29/2016:  Patient arrives to clinic for a 2 week follow up after getting a Left TF KATINA L5&S1 on 11/30/16. Patient reports her pain as a 10/10 today. Patient stated that she got 90% relief from the procedure for the first 2-3 weeks andnow that pain is pain as it was before the injection. Pt reports that in the past the KATINA has provided relief for a couple of months.  Currently she is taking Percocet 10-325mg twice daily and  Gabapentin 300mg at night to assist with pain. She was taking it in the morning as well but this was making her too tired. Additionally she is using a compound pain cream three times daily which is helping. The nature of her pain has not changed since her initial encounter.     Interval History 10/20/2016:  Patient's pain is unchanged and continues to radiate down the left leg. Outside records were obtained and she seems to get the most benefit from left L5 and S1 TF KATINA (up to 4 months of 75% relief). Pain is unchanged now as back when she was receiving these injections. Patient does have chronic urinary incontinence since her CVA but no bowel incontinence. Compound cream is very helpful. Still taking percocet 10/325 TWICE DAILY which is also helpful.    Initial encounter:    Soila Hairston presents to the clinic for the evaluation of lumbar  pain. The pain started 3 years ago following a fall and symptoms have been worsening.    Brief history:  Patient has a history of lower back pain and has been seen by pain management outside of this facility and states that she was discharged from one clinic secondary to multi-sourcing opiate's which they were unaware was a violation of her current opiate contract and have left her second pain management physician secondary to wanting a second opinion and trying to avoid surgical intervention and continue doing interventional treatment options.    Pain Description:    The pain is located in the low back area and radiates to the left lower extremity.      At BEST  0/10     At WORST  10/10 on the WORST day.      On average pain is rated as 6/10.     Today the pain is rated as 7/10    The pain is described as aching, burning, numbing, sharp, shooting, throbbing and tingling      Symptoms interfere with daily activity.     Exacerbating factors: Sitting, Standing, Laying, Bending, Touching, Coughing/Sneezing, Eating, Walking, Night Time, Morning, Flexing, Lifting and Getting out of  bed/chair.      Mitigating factors medications, rest, sitting and injections.     Patient denies night fever/night sweats, urinary incontinence, bowel incontinence, significant weight loss, significant motor weakness and loss of sensations.  Patient denies any suicidal or homicidal ideations    Pain Medications:  Current:  Celexa 20 mg QD  Gabapentin 300 mg TID    Tried in Past:  NSAIDs - Cannot take due to Plavix  TCA -Never  SNRI - Yes  Anti-convulsants - Gabapentin  Muscle Relaxants -Never  Opioids-Percocet    Physical Therapy/Home Exercise: no       report:  Reviewed and consistent with medication use as prescribed.    Pain Procedures: [facility]   11/30/16 Left L5 and S1 TF KATINA- significant benefit  9/13/17 Left L4 and L5 TF KATINA- 90% relief    Chiropractor -never  Acupuncture - never  TENS unit -never  Spinal decompression -never  Joint replacement -never    Imaging:     Lumbar MRI 1/31/17    Narrative     MRI lumbar spine without contrast.01/31/17 13:19:44    History:   low back pain., left lower extremity radiculopathy and history of right lower extremity weakness secondary to CVA    Standard multiplanar noncontrast MRI sequences of the lumbar spine.    Tip of the conus is located at T12 level.  No abnormal signal in distal cord.  Small spinal canal congenital basis.  Superimposed disc protrusions at the L3-L4, L4-L5 and L5-S1 levels with significant spinal stenosis.    L1-2: Normal.    L2-3: Normal.    L3-4: Marked disc space narrowing.  Circumferential disc bulge.  Moderate central spinal stenosis.  Bilateral bony neural foraminal narrowing without evidence of definite nerve root impingement.  Facet hypertrophy.    L4-5: Marked disc space narrowing.  Circumferential disc bulge.  Moderate to severe central spinal stenosis.  Bilateral recess stenosis.  Disc herniation extends inferior to the disc space.  Bilateral bony neural foraminal narrowing with possible bilateral L4 nerve or  impingement.  Bilateral facet hypertrophy.    L5-S1:  Marked disc space narrowing.  Circumferential disc bulge.  Asymmetric disc protrusion paramedian greater to the left side with narrowing of the left lateral recess greater than the right lateral recess and possible left-sided S1 nerve root impingement.  Bilateral bony neural foraminal narrowing with possible bilateral L5 nerve root impingements.   Impression         Disc space narrowing at the L3-L4 level with a circumferential disc bulge and moderate central spinal stenosis with bilateral degenerative changes of the facets and bilateral bony neuroforaminal narrowing.  Marked disc space narrowing at the L4-L5 level with broad-based disc protrusion and moderate to severe central spinal stenosis.  Disc herniation inferior to the disc space.  Narrowing of the lateral recesses with possible bilateral L5 nerve root impingement.  Degenerative changes of the facets.  Disc space narrowing at the L5-S1 level with a broad-based disc protrusion/disc herniation paramedian greater to the left side with possible left-sided S1 nerve root impingement.  Bilateral bony neural foraminal narrowing with evidence of possible bilateral L5 nerve root impingement in the neural foramina.         Past Medical History:   Diagnosis Date    Anticoagulant long-term use     Diabetes mellitus, type 2     Foot drop     a couple weeks ago    Hyperlipidemia     Hypertension     SOB (shortness of breath)     Stroke 12/23/2015     No past surgical history on file.  Social History     Social History    Marital status:      Spouse name: N/A    Number of children: N/A    Years of education: N/A     Occupational History    Not on file.     Social History Main Topics    Smoking status: Current Some Day Smoker    Smokeless tobacco: Never Used    Alcohol use No    Drug use: No    Sexual activity: Not Currently     Other Topics Concern    Not on file     Social History Narrative     "No narrative on file     No family history on file.    No Known Allergies    Current Outpatient Prescriptions   Medication Sig    alprazolam (XANAX) 2 MG Tab Take 1 tablet (2 mg total) by mouth 2 (two) times daily as needed. Take 1/2 tablet by mouth two times daily as needed.    baclofen (LIORESAL) 10 MG tablet Take 1 tablet (10 mg total) by mouth 3 (three) times daily.    BD INSULIN PEN NEEDLE UF MINI 31 gauge x 3/16" Ndle     citalopram (CELEXA) 20 MG tablet Take 1 tablet (20 mg total) by mouth once daily.    furosemide (LASIX) 40 MG tablet Take 40 mg by mouth 2 (two) times daily.    gabapentin (NEURONTIN) 300 MG capsule Take 2 tablets at night for 7 days, then increase to 3 tablets at night.    hydrochlorothiazide (HYDRODIURIL) 25 MG tablet Take 1 tablet (25 mg total) by mouth once daily.    LANTUS SOLOSTAR 100 unit/mL (3 mL) InPn pen Inject 40 Units into the skin once daily.    lidocaine-prilocaine (EMLA) cream     losartan (COZAAR) 50 MG tablet Take 1 tablet (50 mg total) by mouth once daily.    metoprolol tartrate (LOPRESSOR) 50 MG tablet Take 50 mg by mouth once daily.    oxybutynin (DITROPAN) 5 MG Tab Take 1 tablet (5 mg total) by mouth 3 (three) times daily.    oxycodone-acetaminophen (PERCOCET)  mg per tablet Take 1 tablet by mouth 2 (two) times daily.    QVAR 40 mcg/actuation Aero Take 1 puff by mouth 2 (two) times daily.    simvastatin (ZOCOR) 40 MG tablet Take 1 tablet (40 mg total) by mouth once daily.    TRUETEST TEST STRIPS Strp     clopidogrel (PLAVIX) 75 mg tablet Take 1 tablet (75 mg total) by mouth once daily.     No current facility-administered medications for this visit.        REVIEW OF SYSTEMS:    GENERAL:  No weight loss, malaise or fevers.  HEENT:   No recent changes in vision or hearing  NECK:  Negative for lumps, no difficulty with swallowing.  RESPIRATORY:  Negative for cough, wheezing or shortness of breath, patient denies any recent URI.  CARDIOVASCULAR:  " "Negative for chest pain, leg swelling or palpitations.  GI:  Negative for abdominal discomfort, blood in stools or black stools or change in bowel habits.  MUSCULOSKELETAL:  See HPI.  SKIN:  Negative for lesions, rash, and itching.  PSYCH:  No mood disorder or recent psychosocial stressors.  Patient's sleep is disturbed secondary to pain.  HEMATOLOGY/LYMPHOLOGY:  Negative for prolonged bleeding, bruising easily or swollen nodes.   Patient on Plavix for stroke prevention  ENDO: IDDM  NEURO:   History of CVA resulting in right-sided hemiparesis  All other reviewed and negative other than HPI.    OBJECTIVE:    /72   Pulse 76   Temp 98.9 °F (37.2 °C) (Oral)   Resp 18   Ht 5' 4" (1.626 m)   Wt 78 kg (171 lb 15.3 oz)   SpO2 99%   BMI 29.52 kg/m²     PHYSICAL EXAMINATION:    GENERAL: Well appearing, in no acute distress, alert and oriented x3.  PSYCH:  Mood and affect appropriate.  SKIN:  No rashes or abrasions.  HEAD/FACE:  Normocephalic, atraumatic.   CV: RRR with palpation of the radial artery.  PULM: No evidence of respiratory difficulty, symmetric chest rise.  BACK:  SLR is negative bilaterally.  There is pain with palpation over the facet joints of the lumbar spine bilaterally. There is decreased range of motion with extension to 15 degrees, and facet loading maneuvers cause reproducible pain.    EXTREMITIES: Peripheral joint ROM is full and pain free without obvious instability or laxity in all four extremities. No deformities, edema, or skin discoloration. Good capillary refill.  MUSCULOSKELETAL:  There is pain with palpation over the sacroiliac joints bilaterally.  There is pain to palpation over the greater trochanteric bursa bilaterally.  Right-sided weakness secondary to previous stroke.  5/5 strength in left ankle with plantar and 4/5 with dorsiflexion.   NEURO:   No loss of sensation is noted.  GAIT: Antalgic, patient brought in wheelchair.    Lab Results   Component Value Date    CREATININE 1.0 " 03/04/2016      ASSESSMENT: 62 y.o. year old female with lower back and left leg pain, consistent with     Encounter Diagnoses   Name Primary?    Lumbosacral radiculopathy Yes    Facet arthropathy, lumbar     Sacroiliac joint pain     DDD (degenerative disc disease), lumbar     Pain in right hand        PLAN:     - Previous imaging was reviewed and discussed with the patient today.    - She is s/p left L4 and L5 TF KATINA with significant benefit.  Can repeat as needed.    - Continue Gabapentin 300 mg TID.    - We discussed home exercise regimen until she starts formal PT.  I provided her with an [facility] exercise booklet.    - RTC in 2 months or sooner if needed.      The above plan and management options were discussed at length with patient. Patient is in agreement with the above and verbalized understanding.     Meghan Louie  10/02/2017

## 2017-10-06 ENCOUNTER — OFFICE VISIT (OUTPATIENT)
Dept: PULMONOLOGY | Facility: CLINIC | Age: 62
End: 2017-10-06
Payer: MEDICARE

## 2017-10-06 ENCOUNTER — HOSPITAL ENCOUNTER (OUTPATIENT)
Dept: RADIOLOGY | Facility: HOSPITAL | Age: 62
Discharge: HOME | End: 2017-10-06
Attending: NURSE PRACTITIONER
Payer: MEDICARE

## 2017-10-06 ENCOUNTER — HOSPITAL ENCOUNTER (OUTPATIENT)
Dept: PULMONOLOGY | Facility: CLINIC | Age: 62
Discharge: HOME | End: 2017-10-06
Payer: MEDICARE

## 2017-10-06 ENCOUNTER — TELEPHONE (OUTPATIENT)
Dept: NEUROLOGY | Facility: CLINIC | Age: 62
End: 2017-10-06

## 2017-10-06 VITALS
TEMPERATURE: 98 F | BODY MASS INDEX: 29.53 KG/M2 | HEIGHT: 64 IN | WEIGHT: 173 LBS | DIASTOLIC BLOOD PRESSURE: 58 MMHG | HEART RATE: 79 BPM | OXYGEN SATURATION: 92 % | SYSTOLIC BLOOD PRESSURE: 118 MMHG

## 2017-10-06 DIAGNOSIS — R13.10 DYSPHAGIA, UNSPECIFIED TYPE: ICD-10-CM

## 2017-10-06 DIAGNOSIS — R05.9 COUGH: ICD-10-CM

## 2017-10-06 DIAGNOSIS — R09.82 POST-NASAL DRAINAGE: ICD-10-CM

## 2017-10-06 DIAGNOSIS — R06.09 DYSPNEA ON EXERTION: Primary | ICD-10-CM

## 2017-10-06 LAB
PRE FEV1 FVC: 81
PRE FEV1: 1.2
PRE FVC: 1.49
PREDICTED FEV1 FVC: 80
PREDICTED FEV1: 2.28
PREDICTED FVC: 2.86

## 2017-10-06 PROCEDURE — 71020 XR CHEST PA AND LATERAL: CPT | Mod: 26,,, | Performed by: RADIOLOGY

## 2017-10-06 PROCEDURE — 99999 PR PBB SHADOW E&M-EST. PATIENT-LVL IV: CPT | Mod: PBBFAC,,, | Performed by: NURSE PRACTITIONER

## 2017-10-06 PROCEDURE — 71020 XR CHEST PA AND LATERAL: CPT | Mod: TC

## 2017-10-06 PROCEDURE — 94729 DIFFUSING CAPACITY: CPT | Mod: S$GLB,,, | Performed by: INTERNAL MEDICINE

## 2017-10-06 PROCEDURE — 99214 OFFICE O/P EST MOD 30 MIN: CPT | Mod: 25,S$GLB,, | Performed by: NURSE PRACTITIONER

## 2017-10-06 PROCEDURE — 94727 GAS DIL/WSHOT DETER LNG VOL: CPT | Mod: S$GLB,,, | Performed by: INTERNAL MEDICINE

## 2017-10-06 PROCEDURE — 94010 BREATHING CAPACITY TEST: CPT | Mod: S$GLB,,, | Performed by: INTERNAL MEDICINE

## 2017-10-06 RX ORDER — LEVOCETIRIZINE DIHYDROCHLORIDE 5 MG/1
5 TABLET, FILM COATED ORAL NIGHTLY
Qty: 30 TABLET | Refills: 11 | Status: SHIPPED | OUTPATIENT
Start: 2017-10-06 | End: 2018-07-05

## 2017-10-06 RX ORDER — FLUTICASONE PROPIONATE 50 MCG
2 SPRAY, SUSPENSION (ML) NASAL DAILY
Qty: 1 BOTTLE | Refills: 3 | Status: SHIPPED | OUTPATIENT
Start: 2017-10-06 | End: 2017-12-11 | Stop reason: SDUPTHER

## 2017-10-06 NOTE — LETTER
October 6, 2017      Jc Lockhart MD  [address]  [address]  [place] [address]           [facility]  [address]  [place] [address]  Phone: [phone]          Patient: Soila Hairston   MR Number: [id number]   YOB: 1955   Date of Visit: 10/6/2017       Dear Dr. Jc Lockhart:    Thank you for referring Soila Hairston to me for evaluation. Attached you will find relevant portions of my assessment and plan of care.    If you have questions, please do not hesitate to call me. I look forward to following Soila Hairston along with you.    Sincerely,    Clarice Jack, CNS    Enclosure  CC:  No Recipients    If you would like to receive this communication electronically, please contact [email or web address] or [phone] to request more information on [employer] access.    For providers and/or their staff who would like to refer a patient to [facility], please contact us through our one-stop-shop provider referral line, Lidia Tamayo, at [phone].    If you feel you have received this communication in error or would no longer like to receive these types of communications, please e-mail [email or web address]

## 2017-10-06 NOTE — PATIENT INSTRUCTIONS
· Continue to take Anora  · Flonase 2 sprays to each nostril at bedtime  · Xyzal 1 tab every evening at bedtime  · Rinse with a salt water mixture every morning and every evening before bedtime  · Encourage to quit smoking   · CT scan of chest  · Lab work- CBC and CMP   · Follow up with GI doctor to evaluate choking with drinking

## 2017-10-06 NOTE — TELEPHONE ENCOUNTER
----- Message from Clover Aleman sent at 10/6/2017  4:06 PM CDT -----  Contact: Pt daughter Nisreen can be reached at [phone]  Nisreen is calling to schedule an appt... Pt mom has had a stroke a year ago. Please be so kind to contact daughter.        Thank you!

## 2017-10-06 NOTE — PROGRESS NOTES
"Subjective:       Patient ID: Soila Hairston is a 62 y.o. female.    Chief Complaint: Cough and wheezing  HPI   Patient is a 63 y/o female who was referred to Pulmonary by her PCP, Dr. Lockhart, for complaints of cough and wheezing. Patient denies any past history of asthma, COPD, emphysema, sarcoidosis or TB exposure. She has never been intubated or hospitalized for any respiratory issues. States that coughs minimally productive with a greenish-brown dime size globulus occurs most in the morning. Patient takes Anora daily and has noticed an improvement with her breathing. Complains of choking with drinking liquids. Unfortunately, she is a current 20 pack year smoker. Denies coughing while eating food. Reports dyspnea on exertion. + post nasal drainage. Denies fever, chills, night sweats, or hemoptysis.     Review of Systems   Constitutional: Negative for fever, chills, weight loss, appetite change and night sweats.   HENT: Positive for postnasal drip and trouble swallowing. Negative for sore throat and congestion.    Respiratory: Positive for cough, sputum production, choking, wheezing and dyspnea on extertion. Negative for hemoptysis, previous hospitialization due to pulmonary problems and asthma nighttime symptoms.    Cardiovascular: Positive for leg swelling. Negative for chest pain.   Endocrine: Positive for cold intolerance.    Musculoskeletal: Positive for back pain.   Skin: Negative for rash.   Neurological: Positive for weakness (right sided from CVA 2014).   Hematological: Excessive bruising.   Psychiatric/Behavioral: Negative for sleep disturbance.       Objective:       Vitals:    10/06/17 1257   BP: (!) 118/58   Pulse: 79   Temp: 98.3 °F (36.8 °C)   SpO2: (!) 92%   Weight: 78.5 kg (173 lb)   Height: 5' 4" (1.626 m)       Physical Exam   Constitutional: She is oriented to person, place, and time. No distress. She is obese.   HENT:   Head: Normocephalic.   Neck: Neck supple.   Cardiovascular: Normal rate, " "normal heart sounds and intact distal pulses.    Pulmonary/Chest: Normal expansion, symmetric chest wall expansion and effort normal. No respiratory distress. She has decreased breath sounds (bilaterally in bases). She has no wheezes. She has no rhonchi. She has no rales.   Abdominal: There is no guarding.   Musculoskeletal: She exhibits edema (right lower extermity).   Right sided weakness due to CVA in 2014   Neurological: She is alert and oriented to person, place, and time.   Skin: Skin is warm and dry.   Psychiatric: She has a normal mood and affect. Her behavior is normal. Judgment and thought content normal.   Vitals reviewed.    Personal Diagnostic Review    Chest x-ray reviewed 10/6/2017; lung volumes are normal. No evidence of pulmonary disease, pleural fluid, lymph node enlargement, or mass. Trachea is noted to be displaced to the right.  PFT's reviewed 10/6/2017; emily shows moderate restriction with moderate decrease TLC and DLCO. See flowsheets.   Assessment:           Outpatient Encounter Prescriptions as of 10/6/2017   Medication Sig Dispense Refill    alprazolam (XANAX) 2 MG Tab Take 1 tablet (2 mg total) by mouth 2 (two) times daily as needed. Take 1/2 tablet by mouth two times daily as needed. 28 tablet 0    baclofen (LIORESAL) 10 MG tablet Take 1 tablet (10 mg total) by mouth 3 (three) times daily. 90 tablet 3    BD INSULIN PEN NEEDLE UF MINI 31 gauge x 3/16" Ndle       citalopram (CELEXA) 20 MG tablet Take 1 tablet (20 mg total) by mouth once daily. 30 tablet 3    clopidogrel (PLAVIX) 75 mg tablet Take 1 tablet (75 mg total) by mouth once daily. 30 tablet 11    furosemide (LASIX) 40 MG tablet Take 40 mg by mouth 2 (two) times daily.      gabapentin (NEURONTIN) 300 MG capsule Take 2 tablets at night for 7 days, then increase to 3 tablets at night. 90 capsule 11    hydrochlorothiazide (HYDRODIURIL) 25 MG tablet Take 1 tablet (25 mg total) by mouth once daily. 30 tablet 3    LANTUS " SOLOSTAR 100 unit/mL (3 mL) InPn pen Inject 40 Units into the skin once daily. 1 Box 3    lidocaine-prilocaine (EMLA) cream       losartan (COZAAR) 50 MG tablet Take 1 tablet (50 mg total) by mouth once daily. 30 tablet 3    metoprolol tartrate (LOPRESSOR) 50 MG tablet Take 50 mg by mouth once daily.      oxybutynin (DITROPAN) 5 MG Tab Take 1 tablet (5 mg total) by mouth 3 (three) times daily. 90 tablet 3    oxycodone-acetaminophen (PERCOCET)  mg per tablet Take 1 tablet by mouth 2 (two) times daily.      QVAR 40 mcg/actuation Aero Take 1 puff by mouth 2 (two) times daily.      simvastatin (ZOCOR) 40 MG tablet Take 1 tablet (40 mg total) by mouth once daily. 30 tablet 3    TRUETEST TEST STRIPS Strp       fluticasone (FLONASE) 50 mcg/actuation nasal spray 2 sprays by Each Nare route once daily. 1 Bottle 3    levocetirizine (XYZAL) 5 MG tablet Take 1 tablet (5 mg total) by mouth every evening. 30 tablet 11     No facility-administered encounter medications on file as of 10/6/2017.      Problem List Items Addressed This Visit     Cough    Relevant Medications    fluticasone (FLONASE) 50 mcg/actuation nasal spray    levocetirizine (XYZAL) 5 MG tablet      Other Visit Diagnoses     Dyspnea on exertion    -  Primary    Relevant Orders    CT Chest Without Contrast    CBC auto differential    Comprehensive metabolic panel    Post-nasal drainage        Relevant Medications    fluticasone (FLONASE) 50 mcg/actuation nasal spray    levocetirizine (XYZAL) 5 MG tablet    Dysphagia, unspecified type        Relevant Orders    Ambulatory consult to Gastroenterology        Plan:   ·  Continue to take Anora  · Flonase 2 sprays to each nostril at bedtime  · Xyzal 1 tab every evening at bedtime  · Rinse with a salt water mixture every morning and every evening before bedtime  · Encourage to quit smoking  · CT scan of chest  · Lab work- CBC and CMP   · Follow up with GI doctor to evaluate choking with drinking

## 2017-10-06 NOTE — PROGRESS NOTES
"Subjective:       Patient ID: Soila Hairston is a 62 y.o. female.    Chief Complaint: No chief complaint on file.    HPI  Soila Hairston is a 62 y.o. female who presents      Review of patient's allergies indicates:  No Known Allergies  Past Medical History:   Diagnosis Date    Anticoagulant long-term use     Diabetes mellitus, type 2     Foot drop     a couple weeks ago    Hyperlipidemia     Hypertension     SOB (shortness of breath)     Stroke 12/23/2015     No past surgical history on file.  Family History     None        Social History Main Topics    Smoking status: Current Some Day Smoker     Packs/day: 0.50     Years: 40.00     Types: Cigarettes    Smokeless tobacco: Never Used    Alcohol use No    Drug use: No    Sexual activity: Not Currently       Review of Systems    Objective:      Physical Exam  Personal Diagnostic Review    No flowsheet data found.      Assessment:       1. Dyspnea on exertion    2. Cough    3. Post-nasal drainage    4. Dysphagia, unspecified type        Outpatient Encounter Prescriptions as of 10/6/2017   Medication Sig Dispense Refill    alprazolam (XANAX) 2 MG Tab Take 1 tablet (2 mg total) by mouth 2 (two) times daily as needed. Take 1/2 tablet by mouth two times daily as needed. 28 tablet 0    baclofen (LIORESAL) 10 MG tablet Take 1 tablet (10 mg total) by mouth 3 (three) times daily. 90 tablet 3    BD INSULIN PEN NEEDLE UF MINI 31 gauge x 3/16" Ndle       citalopram (CELEXA) 20 MG tablet Take 1 tablet (20 mg total) by mouth once daily. 30 tablet 3    clopidogrel (PLAVIX) 75 mg tablet Take 1 tablet (75 mg total) by mouth once daily. 30 tablet 11    furosemide (LASIX) 40 MG tablet Take 40 mg by mouth 2 (two) times daily.      gabapentin (NEURONTIN) 300 MG capsule Take 2 tablets at night for 7 days, then increase to 3 tablets at night. 90 capsule 11    hydrochlorothiazide (HYDRODIURIL) 25 MG tablet Take 1 tablet (25 mg total) by mouth once daily. 30 tablet 3    " LANTUS SOLOSTAR 100 unit/mL (3 mL) InPn pen Inject 40 Units into the skin once daily. 1 Box 3    lidocaine-prilocaine (EMLA) cream       losartan (COZAAR) 50 MG tablet Take 1 tablet (50 mg total) by mouth once daily. 30 tablet 3    metoprolol tartrate (LOPRESSOR) 50 MG tablet Take 50 mg by mouth once daily.      oxybutynin (DITROPAN) 5 MG Tab Take 1 tablet (5 mg total) by mouth 3 (three) times daily. 90 tablet 3    oxycodone-acetaminophen (PERCOCET)  mg per tablet Take 1 tablet by mouth 2 (two) times daily.      QVAR 40 mcg/actuation Aero Take 1 puff by mouth 2 (two) times daily.      simvastatin (ZOCOR) 40 MG tablet Take 1 tablet (40 mg total) by mouth once daily. 30 tablet 3    TRUETEST TEST STRIPS Strp       fluticasone (FLONASE) 50 mcg/actuation nasal spray 2 sprays by Each Nare route once daily. 1 Bottle 3    levocetirizine (XYZAL) 5 MG tablet Take 1 tablet (5 mg total) by mouth every evening. 30 tablet 11     No facility-administered encounter medications on file as of 10/6/2017.      Orders Placed This Encounter   Procedures    CT Chest Without Contrast     Standing Status:   Future     Standing Expiration Date:   10/6/2018     Order Specific Question:   May the Radiologist modify the order per protocol to meet the clinical needs of the patient?     Answer:   Yes    CBC auto differential     Standing Status:   Future     Standing Expiration Date:   12/5/2018    Comprehensive metabolic panel     Standing Status:   Future     Standing Expiration Date:   12/5/2018    Ambulatory consult to Gastroenterology     Referral Priority:   Routine     Referral Type:   Consultation     Referral Reason:   Specialty Services Required     Requested Specialty:   Gastroenterology     Number of Visits Requested:   1     Plan:       · Continue to take Anora  · Flonase 2 sprays to each nostril at bedtime  · Xyzal 1 tab every evening at bedtime  · Rinse with a salt water mixture every morning and every evening  before bedtime  · CT scan of chest  · Lab work- CBC and CMP   · Follow up with GI doctor to evaluate choking with drinking

## 2017-10-12 DIAGNOSIS — I63.9 CVA (CEREBRAL VASCULAR ACCIDENT): Primary | ICD-10-CM

## 2017-10-13 DIAGNOSIS — I63.9 CVA (CEREBRAL VASCULAR ACCIDENT): Primary | ICD-10-CM

## 2017-10-17 ENCOUNTER — CLINICAL SUPPORT (OUTPATIENT)
Dept: REHABILITATION | Facility: HOSPITAL | Age: 62
End: 2017-10-17
Payer: MEDICARE

## 2017-10-17 DIAGNOSIS — R26.2 DIFFICULTY WALKING: ICD-10-CM

## 2017-10-17 DIAGNOSIS — R53.1 RIGHT SIDED WEAKNESS: ICD-10-CM

## 2017-10-17 DIAGNOSIS — R26.89 BALANCE PROBLEMS: ICD-10-CM

## 2017-10-17 PROCEDURE — 97110 THERAPEUTIC EXERCISES: CPT | Mod: PN

## 2017-10-17 PROCEDURE — 97162 PT EVAL MOD COMPLEX 30 MIN: CPT | Mod: PN

## 2017-10-17 PROCEDURE — G8979 MOBILITY GOAL STATUS: HCPCS | Mod: CJ,PN

## 2017-10-17 PROCEDURE — G8978 MOBILITY CURRENT STATUS: HCPCS | Mod: CK,PN

## 2017-10-17 NOTE — PLAN OF CARE
TIME RECORD    Date: 10/17/17    Start Time:  1445  Stop Time:  1530    PROCEDURES:    TIMED  Procedure Min.                         UNTIMED  Procedure Min.   1 mod comp eval 45         Total Timed Minutes:  0  Total Timed Units:  0  Total Untimed Units:  1   Charges Billed/# of units:  1 mod level eval      OUTPATIENT NEUROLOGICAL REHABILITATION  PHYSICAL THERAPY EVALUATION    Onset Date: 12/ 2015 initial CVA   Primary Diagnosis:   1. Right sided weakness     2. Difficulty walking     3. Balance problems       Treatment Diagnosis:   Past Medical History:   Diagnosis Date    Anticoagulant long-term use     Diabetes mellitus, type 2     Foot drop     a couple weeks ago    Hyperlipidemia     Hypertension     SOB (shortness of breath)     Stroke 12/23/2015     Precautions: standard   Prior Therapy: HH and OP end of 2016 that was suspended due to loss of insurance   Medications: Soila Hairston has a current medication list which includes the following prescription(s): alprazolam, baclofen, bd insulin pen needle uf mini, citalopram, clopidogrel, fluticasone, furosemide, gabapentin, hydrochlorothiazide, lantus solostar, levocetirizine, lidocaine-prilocaine, losartan, metoprolol tartrate, oxybutynin, oxycodone-acetaminophen, qvar, simvastatin, and truetest test strips.  Nutrition:  Overweight  History of Present Illness: R sided weakness  Prior Level of Function: Assistive Device with RW  Social History: lives with  an dtr  Place of Residence (Steps/Adaptations): mobile home with 4 steps to enter the home, with HR  Functional Deficits Leading to Referral/Nature of Injury:  L CVA  Current functional status:  Supervision.   DME owned: RW, transport chair, tub chair  Work/Job description:  2012 and prior she as   Fall Incidence: pt has had 2 falls since CVA but none recent   Patient Therapy Goals: to be able to walk better, to move better, to be more active in the housework, to let go of that walker.        Subjective:      Pt stated: she stated that she has not been able to get therapy b/c she had not benefits   Family present/states: pt had two prior TIA.  Pain: 0/10     Objective:      - Command followin%   - Speech: mild slurring    Mental status: alert, oriented to person, place, and time  Behavior:  calm  Attention Span and Concentration:  Normal    Dominant hand:  right     Posture Alignment :slouched posture    Sensation:  Light Touch: Intact           Proprioception:   Intact    Tone: 2/4 Slight increases in muscle tone, manifested by a catch, followed by minimal resistance throughout the remainder (less than half) of the ROM.  Limbs/muscles affected: R UE/LE    Visual/Auditory: denies changes     Coordination:   - fine motor: impaired R UE  - UE coordination: impaired R UE    - LE coordination:  Mild dysmetria R LE due to tone.    ROM:   UPPER EXTREMITY--AROM/PROM  (R) UE: limited as follows: shoulder ROM impaired, see OT eval   (L) UE: WFLs           RANGE OF MOTION--LOWER EXTREMITIES  (R) LE Hip: WFL   Knee: WFL   Ankle: WFL    (L) LE: Hip: normal   Knee: normal   Ankle: normal    Upper Extremity Strength  See OT note  Lower Extremity Strength   RLE LLE   Hip Flexion: 4-/5 4+/5   Hip Extension:  2/5 3-/5   Hip Abduction: 3-/5 4+/5   Knee Extension: 4/5 4+/5   Knee Flexion: 3-/5 3+/5   Ankle Dorsiflexion: 3-/5 4+/5   Ankle Plantarflexion: 4-/5 4/5       Gait Assessment:   - AD used: RW  - Assistance: CGA  - Distance: >150  - Stairs: Leon per pt report    Gait Analysis:  Deviations noted: decreased stance on the R, decreased hip extension R, R foot drop, decreased R heel strike, decreased L step length    Impairments contributing to deviations:  R sided weakness     TINETTI BALANCE ASSESSMENT TOOL  BALANCE SECTION  1.Sitting Balance:   Steady; safe = 1  2.Rises from chair :  Able, uses arms to help = 1  3.Attempts to arise :  Able, requirese > 1 attempt = 1  4.Immediate standing Balance (first 5  "seconds) : Steady but uses walker or other support = 1  5.Standing balance: Steady but wide stance (medal heel>4" apart) & uses cane or other support = 1  6.Nudged : Steady = 2  7.Eyes closed: Steady = 1  8.Turning 360 degrees:  Steady = 1  9.Sitting Down : Unsafe (misjudged distance, falls into chair) = 0  Balance Score:      GAIT SECTION  10.Initiation of Gait (Immediately after told to go.): Any hesitancy or multiple attempts to start = 0  11.Step length and height :  Step through R=1  12.Foot Clearance :  L foot clears floor=1   13.Step symmetry: Right & Left step length not equal (estimate) = 0  14.Step continuity : Stooping or discontinuity between steps = 0  15.Path: Mild/moderate deviation or uses walking aid = 1  16.Trunk : Marked sway or uses walking aid = 0  17.Walking Time: Heels amost touching while walking = 1    Gait Score:   Balance score:  Total Score=Balance + Gait Score:  Current mobility Gcode CK 8978 56% impaired     Risk Indicators:    Tinetti Tool Score   Risk of Falls   ?18    High   19-23   Moderate   ?24   Low    TU secs   FOTO : 51% impaired  Balance Assessment:    Sitting balance (static,dynamic):WFL  Standing balance (static, dynamic):see TInetti    Functional Mobility (Bed mobility, transfers)  Bed mobility: I  Supine to sit: I  Sit to supine: I  Rolling: I  Transfers to bed: Min A  Sit to stand:  Min A  Stand pivot:  Min A  Stairs: Min A  Wheelchair mobility: N/A    Patient Education/Response:     Education provided re:role of PT, goals for PT, scheduling - pt verbalized good understanding.     Assessment:   Initial Assessment (Pertinent finding, problem list and factors affecting outcome):This is a 62 y.o. female referred to outpatient physical therapy and presents with a medical diagnosis of L CVA with R sided weakness and demonstrates limitations as described in the problem list. Due to pt's deficits, .     Rehab Potiential: good  Pt will benefit from " continuing skilled outpatient physical therapy to address the deficits listed below in the problem list, provide pt/family education and to maximize pt's level of independence in the home and community environment.     Medical necessity is demonstrated by the following IMPAIRMENTS/PROMBLEM LIST:   1. Fall Risk - impaired balance   2. Weakness R sided  3. Impaired muscle tone R side  4. Gait deviations   5. Decreased community ambulation   6. Decreased activity tolerance   7. Difficulty to participate in daily activities   8. Continued inability to participate in vocational pursuits   9. Requires skilled supervision to complete and progress HEP     Anticipated barriers to physical therapy: none      Plans and Goals:   Previous Short Term Goals Status:     Short Term Goals (6 Weeks):   1. Pt will be indep with initial HEP.   2. Pt will be able to perform TUG in 27 secs or less with least restrictive AD without LOB.  3. Pt will be able stand on beep board without AD without beep without UE support without assist for improved standing balance without AD for improved function in standing.      Long Term Goals (12 Weeks):   1. Pt will have MMT score of 4-/5 in all major ms groups in R LE. progressing  2. Patient will be able to achieve greater than or equal to 18/28 on the Tinetti using least restrictive device decreasing patient's risk for falling and improving patient to 20-40% impaired, limited, or restricted category.  Gcode CJ 8978   3. Pt will become a safe household ambulated with least restrictive device with low risk of falls and minimal gait deviations.  4. Pt will complete 6 minute walk test with 0 LOB and least restrictive device.    5. Pt perform tall kneel to 1/2 kneel to stand transitions x 5 bilaterally with UE support  6. Pt will report 41% on the FOTO Functional Assessment  indicating increased functional balance and  mobility.      Certification Period: 10/17/17 to 1/17/17  Recommended Treatment Plan:  1-2 times per week for 12 weeks:  To recieve Education, HEP, therapeutic exercises, neuromuscular re-education, therapeutic activities, manual therapy, joint mobilizations, and modalities modalities prn, ASTYM prn, kinesiotape prn, Functional Dry Needling prn modalities, ASTYM prn, kinesiotape prn, Functional Dry Needling prn to achieve established goals. Pt may be seen by PTA as part of the rehabilitation team.     Other Recommendations: OT eval and treat for RUE impairments and weakness due to CVA      Therapist: Rhnoa Singh, PT    I CERTIFY THE NEED FOR THESE SERVICES FURNISHED UNDER THIS PLAN OF TREATMENT AND WHILE UNDER MY CARE    Physician's comments: ____________________________________________________________________________________________________________________________________________    Physician's Name: ___________________________________

## 2017-10-19 PROBLEM — R26.2 DIFFICULTY WALKING: Status: ACTIVE | Noted: 2017-10-19

## 2017-10-19 PROBLEM — R53.1 RIGHT SIDED WEAKNESS: Status: ACTIVE | Noted: 2017-10-19

## 2017-10-19 PROBLEM — R26.89 BALANCE PROBLEMS: Status: ACTIVE | Noted: 2017-10-19

## 2017-10-20 ENCOUNTER — CLINICAL SUPPORT (OUTPATIENT)
Dept: REHABILITATION | Facility: HOSPITAL | Age: 62
End: 2017-10-20
Payer: MEDICARE

## 2017-10-20 DIAGNOSIS — R26.2 DIFFICULTY WALKING: ICD-10-CM

## 2017-10-20 DIAGNOSIS — R26.89 BALANCE PROBLEMS: ICD-10-CM

## 2017-10-20 DIAGNOSIS — R53.1 RIGHT SIDED WEAKNESS: ICD-10-CM

## 2017-10-20 PROCEDURE — 97110 THERAPEUTIC EXERCISES: CPT | Mod: PN

## 2017-10-20 NOTE — PROGRESS NOTES
"TIME RECORD    Date:  10/20/2017    Start Time:  4:15  Stop Time:  5:05    PROCEDURES:    TIMED  Procedure Min.   TE 50                     UNTIMED  Procedure Min.             Total Timed Minutes:  50  Total Timed Units:  3  Total Untimed Units:  0  Charges Billed/# of units:  3 TE      Progress/Current Status    Subjective:     Patient ID: Soila Hairston is a 62 y.o. female.  Diagnosis:   1. Right sided weakness     2. Difficulty walking     3. Balance problems       Pain: pt states she has R sided weakness in UE and LE. She is agreeable to PT session.     Objective:   Pt arrived to session with RW, ambulated 25 feet to low surface mat. She transferred to mat for supine therex as per logged below 1;1 w/ PTA. Pt ended session on sci fit stepper x 6 min at L2 for B LE/ UE Reciprocating ROM.   .  Date  10/20/17   VISIT 2   Gcode 2 of 10   FOTO 2  Of 5   Cap Visit   Cap Total 95.94  171.54       Balance -   Gait -   Sci fit L2 x5 min     QS -   SAQ 2x10   SLR 2x10 B   SL Abd 2x10 RTB supine   Lumbar bridges 2x10    Ashutosh Hip Add 5"x10   LAQs 2x10 B   Seated Hip Flex 2x10 B   Cybex   Leg Press -   Sit to stand  x10 trials    Hip Abd -   Hip Flex -   Hip Ext -   HS Curls -   Knee Ext -   Step Ups -   Step Downs -   Gait note   CP -   Initials JA 1/6         Assessment:     Pt tolerated session with no episodes of LOB. She responded well verbal instructions provided for sit to standing trials on low mat surface. Ambulated with verbal instruction to maintain trunk erect and head up using RW.     Patient Education/Response:     Cont HEP    Plans and Goals:   Cont PT as per POC. Advance as appropriate.  Short Term Goals (6 Weeks):   1. Pt will be indep with initial HEP.   2. Pt will be able to perform TUG in 27 secs or less with least restrictive AD without LOB.  3. Pt will be able stand on beep board without AD without beep without UE support without assist for improved standing balance without AD for improved function in " standing.      Long Term Goals (12 Weeks):   1. Pt will have MMT score of 4-/5 in all major ms groups in R LE. progressing  2. Patient will be able to achieve greater than or equal to 18/28 on the Tinetti using least restrictive device decreasing patient's risk for falling and improving patient to 20-40% impaired, limited, or restricted category.  [facility] [id number]   3. Pt will become a safe household ambulated with least restrictive device with low risk of falls and minimal gait deviations.  4. Pt will complete 6 minute walk test with 0 LOB and least restrictive device.    5. Pt perform tall kneel to 1/2 kneel to stand transitions x 5 bilaterally with UE support  6. Pt will report 41% on the FOTO Functional Assessment  indicating increased functional balance and  mobility.        Certification Period: 10/17/17 to 1/17/17

## 2017-10-24 ENCOUNTER — CLINICAL SUPPORT (OUTPATIENT)
Dept: REHABILITATION | Facility: HOSPITAL | Age: 62
End: 2017-10-24
Payer: MEDICARE

## 2017-10-24 DIAGNOSIS — R53.1 RIGHT SIDED WEAKNESS: ICD-10-CM

## 2017-10-24 DIAGNOSIS — R26.2 DIFFICULTY WALKING: ICD-10-CM

## 2017-10-24 DIAGNOSIS — R26.89 BALANCE PROBLEMS: ICD-10-CM

## 2017-10-24 DIAGNOSIS — R29.818 FINE MOTOR IMPAIRMENT: ICD-10-CM

## 2017-10-24 DIAGNOSIS — R29.898 FINE MOTOR IMPAIRMENT: ICD-10-CM

## 2017-10-24 DIAGNOSIS — M25.60 DECREASED RANGE OF MOTION: ICD-10-CM

## 2017-10-24 PROCEDURE — 97166 OT EVAL MOD COMPLEX 45 MIN: CPT | Mod: PN

## 2017-10-24 PROCEDURE — G8988 SELF CARE GOAL STATUS: HCPCS | Mod: CI,PN

## 2017-10-24 PROCEDURE — 97110 THERAPEUTIC EXERCISES: CPT | Mod: PN

## 2017-10-24 PROCEDURE — G8987 SELF CARE CURRENT STATUS: HCPCS | Mod: CK,PN

## 2017-10-24 NOTE — PROGRESS NOTES
"TIME RECORD    Date:  10/24/2017    Start Time:  4:15  Stop Time:  5:05    PROCEDURES:    TIMED  Procedure Min.   TE 50                     UNTIMED  Procedure Min.             Total Timed Minutes:  50  Total Timed Units:  3  Total Untimed Units:  0  Charges Billed/# of units:  3 TE      Progress/Current Status    Subjective:     Patient ID: Soila Hairston is a 62 y.o. female.  Diagnosis:   1. Right sided weakness     2. Difficulty walking     3. Balance problems       Pain: Pt reports no pain, and just finished OT evaluation.    Objective:   Pt arrived to session with RW after OT eval, ambulated 30 feet to sci fit stepper x 5 min at L2 for B LE/ UE Reciprocating ROM f/b 20' to mat surface for sitting and supine TE x50.   .  Date  10/24/17 10/20/17   VISIT 3 2   Gcode 3/10 2 of 10   FOTO 3/5 2  Of 5   Cap Visit   Cap Total 95.94 95.94  171.54        Balance -- -   Gait See note -   Sci fit L2 x5 mins L2 x5 min     QS -- -   SAQ 2x10 B 2x10   SLR 2x10 B 2x10 B   SL Abd 2x10 RTB  supine 2x10 RTB supine   Lumbar bridges 2x10 2x10    Ashutosh Hip Add 5''x10 5"x10   LAQs 2x10 B 2x10 B   Seated Hip Flex oot 2x10 B   Cybex   Leg Press -- -   Sit to stand  x10 trials   x10 trials    Hip Abd -- -   Hip Flex -- -   Hip Ext -- -   HS Curls -- -   Knee Ext -- -   Step Ups -- -   Step Downs -- -   Gait See note note   CP -- -   Initials YULIYA DYER 1/6     Assessment:     Pt ambulated at [facility] with min inconsistent R toe scuffing at times; no LOB. Pt demo increased fatigue after stepper and exercises, and requires min A during supine hip abd for increased AROM. Pt given mod TC/VC during STS for full B hip and knee extension.    Patient Education/Response:     Cont HEP    Plans and Goals:   Cont PT as per POC. Advance as appropriate.  Short Term Goals (6 Weeks):   1. Pt will be indep with initial HEP.   2. Pt will be able to perform TUG in 27 secs or less with least restrictive AD without LOB.  3. Pt will be able stand on beep board " without AD without beep without UE support without assist for improved standing balance without AD for improved function in standing.      Long Term Goals (12 Weeks):   1. Pt will have MMT score of 4-/5 in all major ms groups in R LE. progressing  2. Patient will be able to achieve greater than or equal to 18/28 on the Tinetti using least restrictive device decreasing patient's risk for falling and improving patient to 20-40% impaired, limited, or restricted category.  [facility] [place] CJ [id number]   3. Pt will become a safe household ambulated with least restrictive device with low risk of falls and minimal gait deviations.  4. Pt will complete 6 minute walk test with 0 LOB and least restrictive device.    5. Pt perform tall kneel to 1/2 kneel to stand transitions x 5 bilaterally with UE support  6. Pt will report 41% on the FOTO Functional Assessment  indicating increased functional balance and  mobility.        Certification Period: 10/17/17 to 1/17/17

## 2017-10-24 NOTE — PLAN OF CARE
"  TIME RECORD    Date: 10/24/2017    Start Time:  3:40pm  Stop Time:  4:10pm    PROCEDURES:    TIMED  Procedure Min.                 UNTIMED  Procedure Min.   1 mod eval 30         Total Timed Minutes:  0  Total Timed Units:  0  Total Untimed Units:  1  Charges Billed/# of units:  1 mod eval    Visit #: 1  FOTO last administered: 10/24/2017 (initial evaluation)    Medicare charges:  Today's amount: $79.69  Total amount: $79.69    OCCUPATIONAL THERAPY INITIAL EVALUATION & PLAN OF TREATMENT    Patient Name: Soila Pedrofarhad  Physician Name:  Dr. Jc Lockhart MD  Primary Diagnosis:  CVA, R lópez  Treatment Diagnosis:  RUE   Onset Date:  12/23/14  Eval Date:  10/24/2017   Certification Period:  10/24/2017  to 12/24/17  Past Medical History:   Past Medical History:   Diagnosis Date    Anticoagulant long-term use     Diabetes mellitus, type 2     Foot drop     a couple weeks ago    Hyperlipidemia     Hypertension     SOB (shortness of breath)     Stroke 12/23/2015     Precautions:  Universal precuations.  Prior Therapy:  Pt went to [facility] had in patient therapy and home health following stroke. Pt went to [facility] PT "a while ago". Pt has been participating in PT for a few sessions currently.  Signs of Abuse: no  Medications: Soila Hairston has a current medication list which includes the following prescription(s): alprazolam, baclofen, bd insulin pen needle uf mini, citalopram, clopidogrel, fluticasone, furosemide, gabapentin, hydrochlorothiazide, lantus solostar, levocetirizine, lidocaine-prilocaine, losartan, metoprolol tartrate, oxybutynin, oxycodone-acetaminophen, qvar, simvastatin, and truetest test strips.  Nutrition:  Overweight female.  Prior Level of Function: Independent  Social History:  Pt lives at home with  and daughter (25-year-old). Pt was working prior to CVA. Currently, spends time at home. Pt spends time with family during the week.  Functional Deficits Leading to " "Referral/Nature of Injury:  Pt was home with cousins during holiday season. Pt noticed leg was feeling weak. Pt reported family members called 911 and pt lost memory of the situation.   Patient Therapy Goals:  To get more movement and use in her right hand.  Hand dominance: Right  X-Rays/Tests: see images as needed.      Subjective:     Pain: 6/10 in pain of R SF  "I can't hold my pinky straight. The other hand works."    Objective:     Cognitive Exam  Oriented: Person, Place, Time and Situation  Behaviors: Cooperative  Follows Commands/attention: Follows one-step commands  Communication: pt with decreased enunciation of words.  Memory: No Deficits noted - per pt report. Continue to assess. "A lot of times I forget things that happened years ago."  Safety awareness/insight to disability: Pt with fair insight into disabilities.  Coping skills/emotional control: Appropriate to situation    Visual/perceptual:  Tracking: intact  Saccades: intact  Acuity: intact  R/L discrimination: intact  Visual field: intact  Motor Planning Praxis: intact  Comments:     Physical Exam:    Postural examination/scapula alignment: Rounded shoulder and Slouched posture  Joint integrity: no impairments noted  Skin integrity: no impairments noted  Edema: no impairments noted    Palpation: no noted impairments.    Sensation: Soila reports no impairments    Range of Motion:     Shoulder  Right   Left  Pain/Dysfunction with Movement    AROM PROM MMT AROM PROM MMT    flexion 92 Not tested Not tested WNL WNL WNL    extension Not tested Not tested Not tested WNL WNL WNL    abduction 65 Not tested Not tested WNL WNL WNL    adduction At side Not tested Not tested WNL WNL WNL    Internal rotation Not tested Not tested Not tested WNL WNL WNL    ER at 90° abd Not tested Not tested Not tested WNL WNL WNL    ER at 0° abd Not tested Not tested Not tested WNL WNL WNL    Elbow Flex 128 Not tested Not tested WNL WNL WNL    Elbow Ext -60 -60 Not tested WNL " WNL WNL Pt with contracture of R elbow   Pronation Not tested Not tested Not tested WNL WNL WNL    Supination Not tested Not tested Not tested WNL WNL WNL    Wrist Flex Not tested Not tested Not tested WNL WNL WNL    Wrist Ext Not tested Not tested Not tested WNL WNL WNL    RD Not tested Not tested Not tested WNL WNL WNL    UD Not tested Not tested Not tested WNL WNL WNL    Functional fist: able  Opposition to all digits: discoordinated    Comments:   Hard end feel       and Pinch Strengths (in pounds):  Setting 2   Right Left   Elbow bent     1 16 46   2 19 45   3 16 52   Average 17 47.7        Lateral 1 9   Tripod 1 10   Tip 1 9     Comments:       Fine motor coordination:  Assess in future session    Gross motor coordination:  Assess in future session    Tone:  Modified Skylar Scale:   2 More marked increase in muscle tone through most of the ROM, but affected part(s) easily moved - elbow contracted    Comments:     Functional Mobility:  Bed mobility: I  Roll to left: I  Roll to right: I  Supine to sit: I  Sit to supine: Min A  Transfers to bed: Mod I  Transfers to toilet: Mod I  Car transfers: Mod I  Wheelchair mobility: N/A    ADL's:  Feeding: Min A  Grooming: Min A  Hygiene: Min A  UB Dressing: Min A  LB Dressing: Min A  Toileting: Min A  Bathing: Min A    IADL's:  All IADLs are deferred to family members.    FOTO subjective score: Patient scored 45% able/55% impaired on stroke IS hand function survey, demonstrating Pt's functional ability with upper extremity.     Treatment included: OT evaluation completed  Assessment:     This 62 y.o. female referred to Outpatient Occupational Therapy with diagnosis of   Encounter Diagnoses   Name Primary?    Fine motor impairment     Decreased range of motion      . presents with limitations as described in problem list. Patient can benefit from Occupational Therapy services for moist heat, PROM, AAROM, AROM, NMR, Theraputic exercises, joint mobs, home exercise  program provided with written instructions, strengthening, orthotics custom fabrication and fit/train as needed, and Theraband Ex. The following goals were discussed with the patient and she is in agreement with them as to be addressed in the treatment plan.     Problem List:   Decreased function of Right UE, Decreased ROM, Increased pain, Decreased strength, Hypomobility, Muscular atrophy, Inability to perform work/tasks, Inability to perform leisure activiites and Inability to perform self care tasks    Profile and History Assessment of Occupational Performance Level of Clinical Decision Making Complexity Score   Occupational Profile:   Soila Hairston is a 62 y.o. female who lives with their spouse and lives with their daughter, currently not working. Pt spends majority of time in house. Pt goal is to increase use of her right hand.    Comorbidities:   coping style/mechanism, high BMI, level of undertstanding of current condition and hx/o hypertension    Medical and Therapy History Review:   Expanded     Moderate               Performance Deficits    Physical:  Joint Mobility  Joint Stability  Muscle Power/Strength  Muscle Endurance  Control of Voluntary Movement   Strength  Pinch Strength  Gross Motor Coordination  Fine Motor Coordination  Proprioception Functions  Muscle Tone  Postural Control    Cognitive:  Attention  Initiation    Psychosocial:    Habits  Routines  pt currently not working and has deferred IADLs to family members.    5+, high Modification/Need for Assistance:  Minimal-Moderate Modifications/Assistance    Intervention Selection:  Several Treatment Options, including NMR, MT, TA, TE, self-care/ADLs, functional mobility,     Moderate   moderate  Based on PMHX, co morbidities , data from assessments and functional level of assistance required with task and clinical presentation directly impacting function.           Patient Education/Response:     HEP: continue with PT exercises  currently.    Plans and Goals:     Rehab Potential: good    Goals to be met in 4 weeks: (11/24/17)  1) Initiate Hep   2) Pt to tolerate all AROM measurements.  3) Pt to take FM and GM coordination assessment.  4) Pt to increase lateral pinch strength to 3 psi, for ADLs of using knife to cut during meals.  5) Patient will be able to achieve less than or equal to 30% impaired on the FOTO, demonstrating overall improved functional ability with upper extremity. (self-care category)    Goals to be met by discharge:  1) Independent with HEP  2) Pt to increase AROM of shoulder flexion to 120 degrees without compensation of upper traps, and demonstrating extended elbow for overhead reaching activities.  3) Pt to increase AROM of shoulder abduction with coordinated elbow extension to 110 degrees for dressing activities.  4) Pt to increase  strength of R hand to 30 psi, demonstrating improvements of strength in R hand, to use for IADLs such as cooking.  5) Patient will be able to achieve less than or equal to 19% impaired on the FOTO, demonstrating overall improved functional ability with upper extremity. (self-care category)    Recommended Treatment Plan (2-3 times per week for 8 weeks): Therapeutic Exercise, Functional Activities, Patient Education, Home Exercise Program, ADL Training, Electrical Stimulation/TENS/Interferential, Moist Heat/Ice, Sensory/Neuromuscular Reeducation, Cognitive Perceptual Retraining and Manual Therapy  Other Recommendations:  Custom fabrication of orthotics with fit/train as needed.     Therapist's Name: SAMI Ross/LISET   Date: 10/24/2017    I CERTIFY THE NEED FOR THESE SERVICES FURNISHED UNDER THIS PLAN OF TREATMENT AND WHILE UNDER MY CARE    Physician's comments: ________________________________________________________________________________________________________________________________________________      Physician's Name: ___________________________________

## 2017-10-31 ENCOUNTER — CLINICAL SUPPORT (OUTPATIENT)
Dept: REHABILITATION | Facility: HOSPITAL | Age: 62
End: 2017-10-31
Payer: MEDICARE

## 2017-10-31 DIAGNOSIS — R53.1 RIGHT SIDED WEAKNESS: ICD-10-CM

## 2017-10-31 DIAGNOSIS — R26.89 BALANCE PROBLEMS: ICD-10-CM

## 2017-10-31 DIAGNOSIS — R26.2 DIFFICULTY WALKING: ICD-10-CM

## 2017-10-31 PROCEDURE — 97110 THERAPEUTIC EXERCISES: CPT | Mod: PN

## 2017-10-31 NOTE — PROGRESS NOTES
"TIME RECORD    Date:  10/31/2017    Start Time: 1:15  Stop Time:  2:05    PROCEDURES:    TIMED  Procedure Min.   TE 50                     UNTIMED  Procedure Min.             Total Timed Minutes:  50  Total Timed Units:  3  Total Untimed Units:  0  Charges Billed/# of units:  3 TE      Progress/Current Status    Subjective:     Patient ID: Soila Hairston is a 62 y.o. female.  Diagnosis:   1. Right sided weakness     2. Difficulty walking     3. Balance problems       Pain: pt agreeable to PT session. She states she feels tired today but wants to work.     Objective:   Pt arrived to session with RW on level surface, slow linda noted. Pt then positioned in supine for B LE therex as per logged below 1:1 w/ PTA. Pt then ambulated 30 ft to sci fit stepper for B UE/ LE reciprocal AROM x 8 min.   .  Date  10/31/17 10/24/17 10/20/17   VISIT 4 3 2   Gcode 4/10 3/10 2 of 10   FOTO 4/5 3/5 2  Of 5   Cap Visit   Cap Total 95.94  363.42 95.94 95.94  171.54         Balance  -- -   Gait  See note -   Sci fit L2 x6  Min  L2 x5 mins L2 x5 min     QS  -- -   SAQ 1# 2x10 B 2x10 B 2x10   SLR 1# 2x10 B 2x10 B 2x10 B   SL Abd 2x10 RTB  supine 2x10 RTB  supine 2x10 RTB supine   Lumbar bridges 2x10 2x10 2x10    Ashutosh Hip Add 5''x10 5''x10 5"x10   LAQs  2x10 B 2x10 B   Seated Hip Flex 2x10 B oot 2x10 B   Cybex   Leg Press  -- -   Sit to stand  x10 trials x10 trials   x10 trials    Hip Abd  -- -   Hip Flex  -- -   Hip Ext  -- -   HS Curls  -- -   Knee Ext  -- -   Step Ups  -- -   Step Downs  -- -   Gait  See note note   CP  -- -   Initials GOMEZ 1/6 YULIYA DYER 1/6     Assessment:     Pt able to complete session with minor reports of general fatigue post session. She ambulates with slow linda and no effective toe off/ heel strike noted. Verbal instructions provided on technique and safety while ambulating on level surfaces.     Patient Education/Response:     Cont HEP    Plans and Goals:   Cont PT as per POC. Advance as appropriate.  Short Term " Goals (6 Weeks):   1. Pt will be indep with initial HEP.   2. Pt will be able to perform TUG in 27 secs or less with least restrictive AD without LOB.  3. Pt will be able stand on beep board without AD without beep without UE support without assist for improved standing balance without AD for improved function in standing.      Long Term Goals (12 Weeks):   1. Pt will have MMT score of 4-/5 in all major ms groups in R LE. progressing  2. Patient will be able to achieve greater than or equal to 18/28 on the Tinetti using least restrictive device decreasing patient's risk for falling and improving patient to 20-40% impaired, limited, or restricted category.  [facility] [id number]   3. Pt will become a safe household ambulated with least restrictive device with low risk of falls and minimal gait deviations.  4. Pt will complete 6 minute walk test with 0 LOB and least restrictive device.    5. Pt perform tall kneel to 1/2 kneel to stand transitions x 5 bilaterally with UE support  6. Pt will report 41% on the FOTO Functional Assessment  indicating increased functional balance and  mobility.        Certification Period: 10/17/17 to 1/17/17

## 2017-11-02 ENCOUNTER — CLINICAL SUPPORT (OUTPATIENT)
Dept: REHABILITATION | Facility: HOSPITAL | Age: 62
End: 2017-11-02
Payer: MEDICARE

## 2017-11-02 DIAGNOSIS — R53.1 RIGHT SIDED WEAKNESS: ICD-10-CM

## 2017-11-02 DIAGNOSIS — R26.89 BALANCE PROBLEMS: ICD-10-CM

## 2017-11-02 DIAGNOSIS — R26.2 DIFFICULTY WALKING: ICD-10-CM

## 2017-11-02 PROCEDURE — 97110 THERAPEUTIC EXERCISES: CPT | Mod: PN

## 2017-11-02 NOTE — PROGRESS NOTES
"TIME RECORD    Date:  11/02/2017    Start Time: 2:45  Stop Time:  3:35     PROCEDURES:    TIMED  Procedure Min.   TE 50                     UNTIMED  Procedure Min.             Total Timed Minutes:  50  Total Timed Units:  3  Total Untimed Units:  0  Charges Billed/# of units:  3 TE      Progress/Current Status    Subjective:     Patient ID: Soila Hairston is a 62 y.o. female.  Diagnosis:   1. Right sided weakness     2. Difficulty walking     3. Balance problems       Pain:    Pt arrived to therapy with walker and states "feels good today". Pt was quite and not talkative. Pt appeared unenergetic and fatigue ambulating to mat.    Objective:   Pt arrived with rolling walker and ambulated slow to the mat. Pt had an episode of LOB during ambulating to mat, recovered by student PTA with CGA. Pt then had another episode of LOB coming from sit to stand from low mat to rolling walker. Pt completed therex as per logged below 1:1 with SPTA for hip strengthening. Pt ambulated 30 ft from the mat to the scifit stepper for 7 minutes for B LE/UE for reciprocal ROM.         Date  11/2/17 10/31/17 10/24/17 10/20/17   VISIT 5 4 3 2   Gcode 5/10 4/10 3/10 2 of 10   FOTO 5/10 4/5 3/5 2  Of 5   Cap Visit   Cap Total 95.94  459.36 95.94  363.42 95.94 95.94  171.54          Balance   -- -   Gait   See note -   Sci fit L2 x6  Min L2 x6  Min  L2 x5 mins L2 x5 min     QS   -- -   SAQ 1# 2x10 B 1# 2x10 B 2x10 B 2x10   SLR 1# 2x10 B 1# 2x10 B 2x10 B 2x10 B   SL Abd 2x10 RTB  supine 2x10 RTB  supine 2x10 RTB  supine 2x10 RTB supine   Lumbar bridges defer 2x10 2x10 2x10    Ashutosh Hip Add 5''x10 5''x10 5''x10 5"x10   LAQs 2x10 B  2x10 B 2x10 B   Seated Hip Flex 2x10 B 2x10 B oot 2x10 B   Cybex   Leg Press   -- -   Sit to stand  defer x10 trials x10 trials   x10 trials    Hip Abd   -- -   Hip Flex   -- -   Hip Ext   -- -   HS Curls YTB 2x10  -- -   Knee Ext   -- -   Step Ups   -- -   Step Downs   -- -   Gait See note  See note note   CP   -- - "   Initials JA 2/6 GOMEZ 1/6 YULIYA DYER 1/6     Assessment:     Pt was able to tolerate therex well however, pt had 2 LOB while ambulating with rolling walker to mat and transferring from sit>stand. Pt was not energetic at all today. Pt ambulated with slight B knee flexion along with forward posture requiring verbal instruction on correct. Pt appeared fatigue after the end of therapy session but expressed no complaints of pain.       Patient Education/Response:     Cont HEP    Plans and Goals:     Cont PT as per POC. Advance as appropriate.    Short Term Goals (6 Weeks):   1. Pt will be indep with initial HEP.   2. Pt will be able to perform TUG in 27 secs or less with least restrictive AD without LOB.  3. Pt will be able stand on beep board without AD without beep without UE support without assist for improved standing balance without AD for improved function in standing.      Long Term Goals (12 Weeks):   1. Pt will have MMT score of 4-/5 in all major ms groups in R LE. progressing  2. Patient will be able to achieve greater than or equal to 18/28 on the Tinetti using least restrictive device decreasing patient's risk for falling and improving patient to 20-40% impaired, limited, or restricted category.  Gcode [id number]   3. Pt will become a safe household ambulated with least restrictive device with low risk of falls and minimal gait deviations.  4. Pt will complete 6 minute walk test with 0 LOB and least restrictive device.    5. Pt perform tall kneel to 1/2 kneel to stand transitions x 5 bilaterally with UE support  6. Pt will report 41% on the FOTO Functional Assessment  indicating increased functional balance and  mobility.        Certification Period: 10/17/17 to 1/17/17

## 2017-11-09 ENCOUNTER — TELEPHONE (OUTPATIENT)
Dept: REHABILITATION | Facility: HOSPITAL | Age: 62
End: 2017-11-09

## 2017-11-15 ENCOUNTER — TELEPHONE (OUTPATIENT)
Dept: REHABILITATION | Facility: HOSPITAL | Age: 62
End: 2017-11-15

## 2017-12-11 DIAGNOSIS — R09.82 POST-NASAL DRAINAGE: ICD-10-CM

## 2017-12-11 DIAGNOSIS — R05.9 COUGH: ICD-10-CM

## 2017-12-12 RX ORDER — FLUTICASONE PROPIONATE 50 MCG
SPRAY, SUSPENSION (ML) NASAL
Qty: 48 G | Refills: 3 | Status: SHIPPED | OUTPATIENT
Start: 2017-12-12 | End: 2021-12-22

## 2017-12-19 ENCOUNTER — DOCUMENTATION ONLY (OUTPATIENT)
Dept: REHABILITATION | Facility: HOSPITAL | Age: 62
End: 2017-12-19

## 2017-12-19 NOTE — PROGRESS NOTES
Face to Face PTA Conference performed with Anne Cain PTA, Shair Herr PTA, Davis Young PTA Kian Banda PTA regarding patient's current status, overall progress, and plan of care    Rhona Singh, PT    Face to face meeting completed with Rhona Singh PT regarding current status and progress of   Soila Hairston .  Davis Young PTA

## 2018-01-01 NOTE — TELEPHONE ENCOUNTER
----- Message from Jennifer Cuevas sent at 7/13/2017  2:21 PM CDT -----  Contact: Pt's daughter  Pt's daughter is calling to schedule an appt for pt.  Pt is currently seen by Dr. Chen at [facility] and would like to transfer care due to transportation issues.    Pt's daughter can be reached at [phone].  Thank you  
Returned call to patient's daughter, no answer. Left message for her to contact the office at [phone]  
DISCHARGE

## 2018-01-12 PROBLEM — S32.19XA: Status: ACTIVE | Noted: 2018-01-12

## 2018-01-12 PROBLEM — S32.591A CLOSED FRACTURE OF RAMUS OF RIGHT PUBIS: Status: ACTIVE | Noted: 2018-01-12

## 2018-01-29 ENCOUNTER — TELEPHONE (OUTPATIENT)
Dept: PHARMACY | Facility: CLINIC | Age: 63
End: 2018-01-29

## 2018-01-29 NOTE — TELEPHONE ENCOUNTER
Referred by [facility] for assistance with Lantuss gave free trial voucher. Patient has Part D must spend $1100 out of pocket and provide proof of income to qualify for assistance with the Man

## 2018-02-23 ENCOUNTER — HOSPITAL ENCOUNTER (OUTPATIENT)
Dept: RADIOLOGY | Facility: HOSPITAL | Age: 63
Discharge: HOME | End: 2018-02-23
Attending: PHYSICIAN ASSISTANT
Payer: MEDICARE

## 2018-02-23 ENCOUNTER — OFFICE VISIT (OUTPATIENT)
Dept: ORTHOPEDICS | Facility: CLINIC | Age: 63
End: 2018-02-23
Payer: MEDICARE

## 2018-02-23 DIAGNOSIS — S32.591D CLOSED FRACTURE OF RAMUS OF RIGHT PUBIS WITH ROUTINE HEALING, SUBSEQUENT ENCOUNTER: Primary | ICD-10-CM

## 2018-02-23 DIAGNOSIS — T14.8XXA FRACTURE: Primary | ICD-10-CM

## 2018-02-23 DIAGNOSIS — T14.8XXA FRACTURE: ICD-10-CM

## 2018-02-23 PROCEDURE — 99203 OFFICE O/P NEW LOW 30 MIN: CPT | Mod: S$PBB,,, | Performed by: PHYSICIAN ASSISTANT

## 2018-02-23 PROCEDURE — 72190 X-RAY EXAM OF PELVIS: CPT | Mod: TC

## 2018-02-23 PROCEDURE — 99999 PR PBB SHADOW E&M-EST. PATIENT-LVL III: CPT | Mod: PBBFAC,,, | Performed by: PHYSICIAN ASSISTANT

## 2018-02-23 PROCEDURE — 99213 OFFICE O/P EST LOW 20 MIN: CPT | Mod: PBBFAC,25 | Performed by: PHYSICIAN ASSISTANT

## 2018-02-23 PROCEDURE — 72190 X-RAY EXAM OF PELVIS: CPT | Mod: 26,,, | Performed by: RADIOLOGY

## 2018-02-23 NOTE — PROGRESS NOTES
Subjective:      Patient ID: Soila Hairston is a 62 y.o. female.    Chief Complaint: No chief complaint on file.    HPI    Patient is a 62 year old female who presents to clinic for follow up from the ED for right superior and inferior pubic rami fractures that occurred on 01/04/2017 secondary to a fall from standing height. She has been treated non-operatively, range of motion as tolerated, weight bearing as tolerated. She is at home and is receiving home health. She stated that her pain is controlled. She denied new numbness or tingling.     Review of Systems   Constitution: Negative for chills and fever.   Cardiovascular: Negative for chest pain.   Respiratory: Negative for cough and shortness of breath.    Skin: Negative for color change, dry skin, itching, nail changes, poor wound healing and rash.   Musculoskeletal:        Right pelvic fracture     Neurological: Negative for dizziness.   Psychiatric/Behavioral: Negative for altered mental status. The patient is not nervous/anxious.    All other systems reviewed and are negative.        Objective:      General    Constitutional: She is oriented to person, place, and time. She appears well-developed and well-nourished. No distress.   HENT:   Head: Atraumatic.   Eyes: Conjunctivae are normal.   Cardiovascular: Normal rate.    Pulmonary/Chest: Effort normal.   Neurological: She is alert and oriented to person, place, and time.   Psychiatric: She has a normal mood and affect. Her behavior is normal.             Right Hip Exam     Comments:  Arrived to clinic in wheelchair with family  Increased pain with ROM           RADS: Healing fractures of the pubic rami on the right side and the right ischium remain in unchanged position as compared to the previous study.  Calcified uterine fibroid as before.  DJD and vascular calcifications  Assessment:       Encounter Diagnosis   Name Primary?    Closed fracture of ramus of right pubis with routine healing, subsequent  encounter Yes          Plan:       Discussed plan with patient. She is weight bearing as tolerated, range of motion as tolerated. Will continue to work with home health PT. Pain medication: no refill needed She will return to clinic in 6 weeks at time x-ray of her pelvis AP inlet Outlet is needed

## 2018-04-20 ENCOUNTER — TELEPHONE (OUTPATIENT)
Dept: ORTHOPEDICS | Facility: CLINIC | Age: 63
End: 2018-04-20

## 2018-04-20 NOTE — TELEPHONE ENCOUNTER
Notified pt that she miss her appointment on 4/20/18. Pt states that she will call back to reschedule. When she found out about her daughters work schedule. Patient states verbal understanding and has no further questions.

## 2018-04-27 ENCOUNTER — DOCUMENTATION ONLY (OUTPATIENT)
Dept: REHABILITATION | Facility: HOSPITAL | Age: 63
End: 2018-04-27

## 2018-04-27 DIAGNOSIS — R29.818 FINE MOTOR IMPAIRMENT: ICD-10-CM

## 2018-04-27 DIAGNOSIS — R29.898 FINE MOTOR IMPAIRMENT: ICD-10-CM

## 2018-04-27 DIAGNOSIS — R53.1 RIGHT SIDED WEAKNESS: ICD-10-CM

## 2018-04-27 DIAGNOSIS — M25.60 DECREASED RANGE OF MOTION: ICD-10-CM

## 2018-04-27 NOTE — PROGRESS NOTES
REHAB SERVICES OUTPATIENT DISCHARGE SUMMARY  Occupational Therapy      Name:  Soila Hairston  Date:  4/27/18  Date of Evaluation:  10/24/17  Physician:  Jc Lockhart MD  Total # Of Visits:  1  Cancelled:  0  No Shows:  1  Diagnosis:    1. Fine motor impairment     2. Decreased range of motion     3. Right sided weakness         Physical/Functional Status:  Pt seen for eval with no continued care after evaluation.     The patient is to be discharged from our Therapy service for the following reason(s):  Patient has not attended therapy since evaluation.    Degree of Goal Achievement:  Patient has not met goals secondary to:  No show    Patient Education:  Pt provided with written instructions, demonstration and education of HEP with pt demonstrating and verbalizing understanding of appropriate movements and techniques to increase strength, ROM and activity tolerance for increased IND.      Discharge Plan:  Other:  Evaluated only.

## 2018-06-06 ENCOUNTER — TELEPHONE (OUTPATIENT)
Dept: PAIN MEDICINE | Facility: CLINIC | Age: 63
End: 2018-06-06

## 2018-06-06 NOTE — TELEPHONE ENCOUNTER
----- Message from Martita Carney sent at 6/6/2018 12:08 PM CDT -----  Contact: pt's daughter,Nisreen            Name of Who is Calling: Nisreen      What is the request in detail: requesting a call back in reference to transferring pt's care to another provider,closer to area. Pt's daughter states due to transportation reasons. Please call pt and advise.      Can the clinic reply by [facility]: no      [facility] [phone]

## 2018-06-20 ENCOUNTER — TELEPHONE (OUTPATIENT)
Dept: PAIN MEDICINE | Facility: CLINIC | Age: 63
End: 2018-06-20

## 2018-06-20 ENCOUNTER — OFFICE VISIT (OUTPATIENT)
Dept: PAIN MEDICINE | Facility: CLINIC | Age: 63
End: 2018-06-20
Payer: MEDICARE

## 2018-06-20 VITALS
BODY MASS INDEX: 25.61 KG/M2 | SYSTOLIC BLOOD PRESSURE: 122 MMHG | HEIGHT: 64 IN | DIASTOLIC BLOOD PRESSURE: 70 MMHG | WEIGHT: 150 LBS

## 2018-06-20 DIAGNOSIS — M54.17 LUMBOSACRAL RADICULOPATHY: Primary | ICD-10-CM

## 2018-06-20 DIAGNOSIS — M51.36 DDD (DEGENERATIVE DISC DISEASE), LUMBAR: ICD-10-CM

## 2018-06-20 DIAGNOSIS — Z79.01 LONG TERM (CURRENT) USE OF ANTICOAGULANTS: ICD-10-CM

## 2018-06-20 DIAGNOSIS — M47.816 LUMBAR SPONDYLOSIS: ICD-10-CM

## 2018-06-20 DIAGNOSIS — I63.9 CEREBROVASCULAR ACCIDENT (CVA), UNSPECIFIED MECHANISM: ICD-10-CM

## 2018-06-20 PROCEDURE — 3078F DIAST BP <80 MM HG: CPT | Mod: CPTII,S$GLB,, | Performed by: ANESTHESIOLOGY

## 2018-06-20 PROCEDURE — 99999 PR PBB SHADOW E&M-EST. PATIENT-LVL III: CPT | Mod: PBBFAC,,, | Performed by: ANESTHESIOLOGY

## 2018-06-20 PROCEDURE — 3008F BODY MASS INDEX DOCD: CPT | Mod: CPTII,S$GLB,, | Performed by: ANESTHESIOLOGY

## 2018-06-20 PROCEDURE — 99214 OFFICE O/P EST MOD 30 MIN: CPT | Mod: S$GLB,,, | Performed by: ANESTHESIOLOGY

## 2018-06-20 PROCEDURE — 3074F SYST BP LT 130 MM HG: CPT | Mod: CPTII,S$GLB,, | Performed by: ANESTHESIOLOGY

## 2018-06-20 NOTE — PROGRESS NOTES
Chronic Pain - New Consult    Referring Physician: Andrew, Jerireferral      Chief Complaint   Patient presents with    Low-back Pain        SUBJECTIVE:    Soila Hairston is a 62 y/o female with hx of CVA with residual right sided weakness, HTN, DM, and tobacco use who presents to the clinic for the evaluation of low back pain. The pain started 4 years ago following an injury on the job. The pain is located in the lumbosacral area at the midline and radiates down the left leg into the foot.  The pain is described as aching, throbbing and pounding and is rated as 8/10. The pain is rated with a score of  2/10 on the BEST day and a score of 10/10 on the WORST day.  Symptoms interfere with daily activity. The pain is exacerbated by bending forward at the waist and rising from a seated position.  The pain is mitigated by laying down, medications and injections. She is an established patient of my colleague, Dr. Chen, who performed a lumbar transforaminal KATINA for a similar pain which helped significantly last year.      Patient denies night fever/night sweats, urinary/bowel incontinence and significant weight loss. She reports chronic right-sided weakness due to CVA.    Physical Therapy/Home Exercise: Yes, in the past    Pain Disability Index Review:  Last 3 PDI Scores 6/20/2018 10/2/2017 8/22/2017   Pain Disability Index (PDI) 54 35 46       Pain Medications:    - Percocet 10/325 BID  - Gabapentin 300 mg TID    Anti-coagulants:    - Plavix 75 mg daily     report: Reviewed    Pain Procedures:     Lumbar TESI with Dr. Chen    Imaging:     CT Lumbar Spine (1/7/2018):    Findings: The visualized portion of the intra-abdominal contents is significant scattered vascular calcification. The lumbar spinal alignment the vertebral body heights are satisfactorily preserved. There are minimally displaced bilateral sacral fractures adjacent to the SI joints.    MRI Lumbar Spine (1/31/2017):      Standard multiplanar noncontrast  MRI sequences of the lumbar spine.    Tip of the conus is located at T12 level.  No abnormal signal in distal cord.  Small spinal canal congenital basis.  Superimposed disc protrusions at the L3-L4, L4-L5 and L5-S1 levels with significant spinal stenosis.    L1-2: Normal.    L2-3: Normal.    L3-4: Marked disc space narrowing.  Circumferential disc bulge.  Moderate central spinal stenosis.  Bilateral bony neural foraminal narrowing without evidence of definite nerve root impingement.  Facet hypertrophy.    L4-5: Marked disc space narrowing.  Circumferential disc bulge.  Moderate to severe central spinal stenosis.  Bilateral recess stenosis.  Disc herniation extends inferior to the disc space.  Bilateral bony neural foraminal narrowing with possible bilateral L4 nerve or impingement.  Bilateral facet hypertrophy.    L5-S1:  Marked disc space narrowing.  Circumferential disc bulge.  Asymmetric disc protrusion paramedian greater to the left side with narrowing of the left lateral recess greater than the right lateral recess and possible left-sided S1 nerve root impingement.  Bilateral bony neural foraminal narrowing with possible bilateral L5 nerve root impingements.    Past Medical History:   Diagnosis Date    Anticoagulant long-term use     Diabetes mellitus, type 2     Foot drop     a couple weeks ago    Hyperlipidemia     Hypertension     SOB (shortness of breath)     Stroke 12/23/2015     History reviewed. No pertinent surgical history.  Social History     Social History    Marital status:      Spouse name: N/A    Number of children: N/A    Years of education: N/A     Occupational History    Not on file.     Social History Main Topics    Smoking status: Current Some Day Smoker     Packs/day: 0.50     Years: 40.00     Types: Cigarettes    Smokeless tobacco: Never Used    Alcohol use No    Drug use: No    Sexual activity: Not Currently     Other Topics Concern    Not on file     Social History  "Narrative    No narrative on file     History reviewed. No pertinent family history.    Review of patient's allergies indicates:  No Known Allergies    Current Outpatient Prescriptions   Medication Sig    alprazolam (XANAX) 2 MG Tab Take 1 tablet (2 mg total) by mouth 2 (two) times daily as needed. Take 1/2 tablet by mouth two times daily as needed.    baclofen (LIORESAL) 10 MG tablet Take 1 tablet (10 mg total) by mouth 3 (three) times daily.    BD INSULIN PEN NEEDLE UF MINI 31 gauge x 3/16" Ndle     citalopram (CELEXA) 20 MG tablet Take 1 tablet (20 mg total) by mouth once daily.    fluticasone (FLONASE) 50 mcg/actuation nasal spray USE 2 SPRAYS IN EACH NOSTRIL EVERY DAY    furosemide (LASIX) 40 MG tablet Take 40 mg by mouth 2 (two) times daily.    gabapentin (NEURONTIN) 300 MG capsule Take 2 tablets at night for 7 days, then increase to 3 tablets at night.    hydrochlorothiazide (HYDRODIURIL) 25 MG tablet Take 1 tablet (25 mg total) by mouth once daily.    LANTUS SOLOSTAR 100 unit/mL (3 mL) InPn pen Inject 40 Units into the skin once daily.    levocetirizine (XYZAL) 5 MG tablet Take 1 tablet (5 mg total) by mouth every evening.    lidocaine-prilocaine (EMLA) cream     losartan (COZAAR) 50 MG tablet Take 1 tablet (50 mg total) by mouth once daily.    metoprolol tartrate (LOPRESSOR) 50 MG tablet Take 50 mg by mouth once daily.    oxybutynin (DITROPAN) 5 MG Tab Take 1 tablet (5 mg total) by mouth 3 (three) times daily.    oxyCODONE-acetaminophen (PERCOCET)  mg per tablet Take 1 tablet by mouth every 4 (four) hours as needed for Pain.    QVAR 40 mcg/actuation Aero Take 1 puff by mouth 2 (two) times daily.    simvastatin (ZOCOR) 40 MG tablet Take 1 tablet (40 mg total) by mouth once daily.    TRUETEST TEST STRIPS Strp     clopidogrel (PLAVIX) 75 mg tablet Take 1 tablet (75 mg total) by mouth once daily.     No current facility-administered medications for this visit.        REVIEW OF " "SYSTEMS:  GENERAL: No weight loss or fevers.  HEENT: Negative for frequent or significant headaches.  NECK: Negative for lumps or significant neck swelling.  RESPIRATORY: Negative for wheezing or shortness of breath.  CARDIOVASCULAR: Negative for chest pain or palpitations.  GI: No blood in stools or black stools or change in bowel habits.  : Negative for kidney stones, urinary tract infections, or incontinence.  MUSCULOSKELETAL: See HPI  SKIN: Negative for rash or itching.  PSYCH: Negative for sleep disturbance.  HEMATOLOGY/LYMPHOLOGY: takes Plavix  NEURO:  No history of syncope, seizures or tremors.    OBJECTIVE:    /70   Ht 5' 4" (1.626 m)   Wt 68 kg (150 lb) Comment: pt. unable to stand, wheelchair bound. verbal on weight  BMI 25.75 kg/m²     PHYSICAL EXAMINATION:  GENERAL: Well appearing, in no acute distress.  PSYCH:  Mood and affect is appropriate.  Awake, alert, and oriented x 3.  SKIN: Skin color, texture, turgor normal, no rashes or lesions  HEENT: Normocephalic, atraumatic.  EOM intact.  CV: Radial pulses are 2+.  RESP:  Respirations are unlabored.  MSK:  No atrophy or tone abnormalities are noted.      Neck: No obvious deformity or signs of trauma.  Normal cervical spine range of motion.    Back: Straight leg raising in the sitting and supine positions is negative for  radicular pain. Tenderness to palpation over the lower lumbar spine at midline.  No tenderness over lumbar paraspinous muscles. + pain with facet loading and back extension/rotation. Decreased range of motion on flexion and extension.    Buttocks:  No pain to palpation over the PSIS.     Extremities:  No edema or skin discolorations noted.     Gait:  Gait is antalgic, in wheelchair.    NEUR:  There is weakness on dorsiflexion of right foot. Otherwise, strength testing is 5/5 in lower extremities. No loss of sensation is noted.     ASSESSMENT: 63 y.o. female with chronic low back and left leg pain, consistent with      1. " Lumbosacral radiculopathy    2. DDD (degenerative disc disease), lumbar    3. Lumbar spondylosis    4. Long term (current) use of anticoagulants    5. Cerebrovascular accident (CVA), unspecified mechanism          PLAN:     - Order Flexion-Extension X-rays of the Lumbar spine to rule out any instability.  - Schedule for a Left Transforaminal epidural steroid injection at L4/5 and L5/S1 as this has helped significantly in the past.  - Counseled patient regarding the importance of smoking cessation.  - RTC 3 weeks after procedure.    The above plan and management options were discussed at length with patient. Patient is in agreement with the above and verbalized understanding. It will be communicated with the referring physician via electronic record, fax, or mail.    dA Patel III  06/20/2018

## 2018-06-25 ENCOUNTER — TELEPHONE (OUTPATIENT)
Dept: PAIN MEDICINE | Facility: CLINIC | Age: 63
End: 2018-06-25

## 2018-07-09 ENCOUNTER — TELEPHONE (OUTPATIENT)
Dept: PAIN MEDICINE | Facility: CLINIC | Age: 63
End: 2018-07-09

## 2018-07-09 NOTE — TELEPHONE ENCOUNTER
Spoke with pt's daughter & informed her that I spoke with sx nurse and she pushed the sx time back to 10 a.m., with an arrival time of around 0830, but let her know that  Sx center will call with exact times and instructions. Understanding verbalized.

## 2018-07-09 NOTE — TELEPHONE ENCOUNTER
----- Message from Devi Merida sent at 7/9/2018  9:36 AM CDT -----  Contact: daughter/Nisreen  [phone]  Patient daughter is requesting to speak with you concerning the patient upcoming procedure.  Please call and advise

## 2018-07-10 ENCOUNTER — TELEPHONE (OUTPATIENT)
Dept: PAIN MEDICINE | Facility: CLINIC | Age: 63
End: 2018-07-10

## 2018-07-10 NOTE — TELEPHONE ENCOUNTER
Spoke with pt's daughter and rescheduled sx for 7/24/18 and f/u appt for 8/14/18. Nisreen confirmed dates. Mailed out pre-procedure instructions and appt reminder.

## 2018-07-12 DIAGNOSIS — M54.16 LUMBAR RADICULOPATHY: Primary | ICD-10-CM

## 2018-07-18 ENCOUNTER — TELEPHONE (OUTPATIENT)
Dept: PAIN MEDICINE | Facility: CLINIC | Age: 63
End: 2018-07-18

## 2018-07-18 NOTE — TELEPHONE ENCOUNTER
----- Message from Jacey López sent at 7/18/2018  9:08 AM CDT -----  Contact: Self [phone]  Patient is calling to talk to nurse concerning questions on her procedure. Please advice

## 2018-07-18 NOTE — TELEPHONE ENCOUNTER
R/t pt's daughter's phone call. Informed Nisreen that pt is to stop taking her Plavix on today through 7/24/18. Nisreen stated that pt already stopped taking Plavix on Monday, 7/16/18 and she will proceed with the procedure.

## 2018-07-24 ENCOUNTER — TELEPHONE (OUTPATIENT)
Dept: PAIN MEDICINE | Facility: CLINIC | Age: 63
End: 2018-07-24

## 2018-07-24 NOTE — TELEPHONE ENCOUNTER
----- Message from Devi Merida sent at 7/24/2018 10:36 AM CDT -----  Contact: daughter/[phone] Nisreen Herron daughter would like to know if the patient is still at the Dr office   Please call and advise

## 2018-07-27 ENCOUNTER — TELEPHONE (OUTPATIENT)
Dept: PAIN MEDICINE | Facility: CLINIC | Age: 63
End: 2018-07-27

## 2018-07-27 NOTE — TELEPHONE ENCOUNTER
"Spoke with pt regarding request and for excuse for jury duty that is not until 8/28/18. Asked pt if she was in pain since her injection 3 days ago, pt stated, "No."  Pt mentioned that she has hx of a stroke. Informed pt to contact her PCP for this matter since the injection did not debilitate her. Pt verbalized understanding.   "

## 2018-08-28 ENCOUNTER — OFFICE VISIT (OUTPATIENT)
Dept: PAIN MEDICINE | Facility: CLINIC | Age: 63
End: 2018-08-28
Payer: MEDICARE

## 2018-08-28 ENCOUNTER — TELEPHONE (OUTPATIENT)
Dept: PAIN MEDICINE | Facility: CLINIC | Age: 63
End: 2018-08-28

## 2018-08-28 VITALS
SYSTOLIC BLOOD PRESSURE: 138 MMHG | BODY MASS INDEX: 29.87 KG/M2 | HEART RATE: 72 BPM | WEIGHT: 174 LBS | DIASTOLIC BLOOD PRESSURE: 78 MMHG

## 2018-08-28 DIAGNOSIS — M54.17 LUMBOSACRAL RADICULOPATHY: ICD-10-CM

## 2018-08-28 DIAGNOSIS — M47.816 LUMBAR SPONDYLOSIS: ICD-10-CM

## 2018-08-28 DIAGNOSIS — Z79.01 LONG TERM (CURRENT) USE OF ANTICOAGULANTS: ICD-10-CM

## 2018-08-28 DIAGNOSIS — I63.9 CEREBROVASCULAR ACCIDENT (CVA), UNSPECIFIED MECHANISM: ICD-10-CM

## 2018-08-28 DIAGNOSIS — M51.36 DDD (DEGENERATIVE DISC DISEASE), LUMBAR: Primary | ICD-10-CM

## 2018-08-28 DIAGNOSIS — M54.16 LUMBAR RADICULOPATHY: Primary | ICD-10-CM

## 2018-08-28 PROCEDURE — 3078F DIAST BP <80 MM HG: CPT | Mod: CPTII,S$GLB,, | Performed by: ANESTHESIOLOGY

## 2018-08-28 PROCEDURE — 99214 OFFICE O/P EST MOD 30 MIN: CPT | Mod: S$GLB,,, | Performed by: ANESTHESIOLOGY

## 2018-08-28 PROCEDURE — 3075F SYST BP GE 130 - 139MM HG: CPT | Mod: CPTII,S$GLB,, | Performed by: ANESTHESIOLOGY

## 2018-08-28 PROCEDURE — 99999 PR PBB SHADOW E&M-EST. PATIENT-LVL III: CPT | Mod: PBBFAC,,, | Performed by: ANESTHESIOLOGY

## 2018-08-28 PROCEDURE — 3008F BODY MASS INDEX DOCD: CPT | Mod: CPTII,S$GLB,, | Performed by: ANESTHESIOLOGY

## 2018-08-28 NOTE — PROGRESS NOTES
Chronic Pain -Established Visit      INTERVAL UPDATE (08/28/2018):    Soila Hairston returns to clinic for the evaluation of low back pain. She is s/p Left L4/5 AND L5/S1 TRANSFORAMINAL EPIDURAL STEROID INJECTION performed on 7/24/18. She reports 70% improvement after the procedure which lasted approximately 3 weeks.  She is no longer experiencing pain radiating down the leg. She complains today of pain in the left lumbosacral and buttock area. Current pain intensity is 5/10. She describes the pain as aching in nature. The pain is made worse with activity.     SUBJECTIVE (06/20/2018)    Soila Hairston is a 64 y/o female with hx of CVA with residual right sided weakness, HTN, DM, and tobacco use who presents to the clinic for the evaluation of low back pain. The pain started 4 years ago following an injury on the job. The pain is located in the lumbosacral area at the midline and radiates down the left leg into the foot.  The pain is described as aching, throbbing and pounding and is rated as 8/10. The pain is rated with a score of  2/10 on the BEST day and a score of 10/10 on the WORST day.  Symptoms interfere with daily activity. The pain is exacerbated by bending forward at the waist and rising from a seated position.  The pain is mitigated by laying down, medications and injections. She is an established patient of my colleague, Dr. Chen, who performed a lumbar transforaminal KATINA for a similar pain which helped significantly last year.      Patient denies night fever/night sweats, urinary/bowel incontinence and significant weight loss. She reports chronic right-sided weakness due to CVA.    Physical Therapy/Home Exercise: Yes, in the past    Pain Disability Index Review:  Last 3 PDI Scores 8/28/2018 6/20/2018 10/2/2017   Pain Disability Index (PDI) 27 54 35       Pain Medications:    - Percocet 10/325 BID  - Gabapentin 300 mg TID    Anti-coagulants:    - Plavix 75 mg daily     report: Reviewed    Pain  Procedures:     Left L4/5 AND L5/S1 TRANSFORAMINAL EPIDURAL STEROID INJECTION (8/7/2018)  Lumbar TESI with Dr. Chen    Imaging:     X-Ray Lumbar Spine Ap Lateral w/Flex Ext (6/20/2018)    Findings:  There is straightening of the normal lumbar lordosis.  The vertebral body heights are satisfactorily preserved.  There is loss of disc space height with degenerative endplate change and facet hypertrophy from L3-4 through L5-S1.  There is no fracture, dislocation, or bony erosion.  There is extensive arterial vascular calcification.  There is no instability upon flexion extension.  There is no fracture, dislocation, or bony erosion identified.       CT Lumbar Spine (1/7/2018):    Findings: The visualized portion of the intra-abdominal contents is significant scattered vascular calcification. The lumbar spinal alignment the vertebral body heights are satisfactorily preserved. There are minimally displaced bilateral sacral fractures adjacent to the SI joints.      MRI Lumbar Spine (1/31/2017):    Standard multiplanar noncontrast MRI sequences of the lumbar spine.    Tip of the conus is located at T12 level.  No abnormal signal in distal cord.  Small spinal canal congenital basis.  Superimposed disc protrusions at the L3-L4, L4-L5 and L5-S1 levels with significant spinal stenosis.    L1-2: Normal.    L2-3: Normal.    L3-4: Marked disc space narrowing.  Circumferential disc bulge.  Moderate central spinal stenosis.  Bilateral bony neural foraminal narrowing without evidence of definite nerve root impingement.  Facet hypertrophy.    L4-5: Marked disc space narrowing.  Circumferential disc bulge.  Moderate to severe central spinal stenosis.  Bilateral recess stenosis.  Disc herniation extends inferior to the disc space.  Bilateral bony neural foraminal narrowing with possible bilateral L4 nerve or impingement.  Bilateral facet hypertrophy.    L5-S1:  Marked disc space narrowing.  Circumferential disc bulge.  Asymmetric disc  protrusion paramedian greater to the left side with narrowing of the left lateral recess greater than the right lateral recess and possible left-sided S1 nerve root impingement.  Bilateral bony neural foraminal narrowing with possible bilateral L5 nerve root impingements.    Past Medical History:   Diagnosis Date    Anticoagulant long-term use     Depression     Diabetes mellitus, type 2     Foot drop     a couple weeks ago    Hyperlipidemia     Hypertension     SOB (shortness of breath)     Stroke 12/23/2015     History reviewed. No pertinent surgical history.  Social History     Socioeconomic History    Marital status:      Spouse name: Not on file    Number of children: Not on file    Years of education: Not on file    Highest education level: Not on file   Social Needs    Financial resource strain: Not on file    Food insecurity - worry: Not on file    Food insecurity - inability: Not on file    Transportation needs - medical: Not on file    Transportation needs - non-medical: Not on file   Occupational History    Not on file   Tobacco Use    Smoking status: Current Some Day Smoker     Packs/day: 0.00     Years: 40.00     Pack years: 0.00     Types: Cigarettes    Smokeless tobacco: Never Used    Tobacco comment: smoke 5 cigs a day   Substance and Sexual Activity    Alcohol use: No     Alcohol/week: 0.0 oz    Drug use: No    Sexual activity: Not Currently   Other Topics Concern    Not on file   Social History Narrative    Not on file     Family History   Problem Relation Age of Onset    Dementia Mother        Review of patient's allergies indicates:  No Known Allergies    Current Outpatient Medications   Medication Sig    alprazolam (XANAX) 2 MG Tab Take 1 tablet (2 mg total) by mouth 2 (two) times daily as needed. Take 1/2 tablet by mouth two times daily as needed.    baclofen (LIORESAL) 10 MG tablet Take 1 tablet (10 mg total) by mouth 3 (three) times daily. (Patient taking  "differently: Take 10 mg by mouth 2 (two) times daily. )    BD INSULIN PEN NEEDLE UF MINI 31 gauge x 3/16" Ndle     citalopram (CELEXA) 20 MG tablet Take 1 tablet (20 mg total) by mouth once daily.    clopidogrel (PLAVIX) 75 mg tablet Take 1 tablet (75 mg total) by mouth once daily.    fluticasone (FLONASE) 50 mcg/actuation nasal spray USE 2 SPRAYS IN EACH NOSTRIL EVERY DAY (Patient taking differently: USE 2 SPRAYS IN EACH NOSTRIL EVERY DAY prn)    furosemide (LASIX) 40 MG tablet Take 40 mg by mouth 2 (two) times daily.    gabapentin (NEURONTIN) 300 MG capsule Take 2 tablets at night for 7 days, then increase to 3 tablets at night. (Patient taking differently: Take 300 mg by mouth every evening. Take 2 tablets at night for 7 days, then increase to 3 tablets at night.)    hydrochlorothiazide (HYDRODIURIL) 25 MG tablet Take 1 tablet (25 mg total) by mouth once daily.    LANTUS SOLOSTAR 100 unit/mL (3 mL) InPn pen Inject 40 Units into the skin once daily.    losartan (COZAAR) 50 MG tablet Take 1 tablet (50 mg total) by mouth once daily.    metoprolol tartrate (LOPRESSOR) 50 MG tablet Take 50 mg by mouth once daily.    oxybutynin (DITROPAN) 5 MG Tab Take 1 tablet (5 mg total) by mouth 3 (three) times daily.    oxyCODONE-acetaminophen (PERCOCET)  mg per tablet Take 1 tablet by mouth every 4 (four) hours as needed for Pain.    simvastatin (ZOCOR) 40 MG tablet Take 1 tablet (40 mg total) by mouth once daily.    TRUETEST TEST STRIPS Strp     umeclidinium brm/vilanterol tr (ANORO ELLIPTA INHL) Inhale into the lungs once daily.     No current facility-administered medications for this visit.        REVIEW OF SYSTEMS:  GENERAL: No weight loss or fevers.  HEENT: Negative for frequent or significant headaches.  RESPIRATORY: Negative for wheezing or shortness of breath.  CARDIOVASCULAR: Negative for chest pain or palpitations.  GI: No blood in stools or black stools or change in bowel habits.  : Negative for " kidney stones, urinary tract infections, or incontinence.  MUSCULOSKELETAL: See HPI  SKIN: Negative for rash or itching.  PSYCH: Negative for sleep disturbance.  HEMATOLOGY/LYMPHOLOGY: takes Plavix  NEURO:  No history of syncope, seizures or tremors.    OBJECTIVE:    /78   Pulse 72   Wt 78.9 kg (174 lb)   BMI 29.87 kg/m²     PHYSICAL EXAMINATION:  GENERAL: Well appearing, in no acute distress.  PSYCH:  Mood and affect is appropriate.  Awake, alert, and oriented x 3.  SKIN: Skin color, texture, turgor normal, no rashes or lesions  HEENT: Normocephalic, atraumatic.  EOM intact.  CV: Radial pulses are 2+.  RESP:  Respirations are unlabored.  MSK:  No atrophy or tone abnormalities are noted.      Neck: No obvious deformity or signs of trauma.  Normal cervical spine range of motion.    Back: Straight leg raising is negative for radicular pain. Tenderness to palpation over the lower lumbar spine at midline.  No tenderness over lumbar paraspinous muscles. + pain with back extension/rotation. Decreased range of motion on flexion and extension.    Buttocks:  No pain to palpation over the PSIS.     Extremities:  No edema or skin discolorations noted.     Gait:  Gait is antalgic, in wheelchair.    NEUR:  There is weakness on dorsiflexion of right foot. Otherwise, strength testing is 5/5 in lower extremities. No loss of sensation is noted.     ASSESSMENT: 63 y.o. female with chronic left-sided low back and buttock pain which is improving but persistent after Left L4/5 and L5/S1 transforaminal KATINA.  It is my opinion the patient would benefit from a repeat transforaminal KATINA at the left L5-S1 and S1 levels.    1. DDD (degenerative disc disease), lumbar    2. Lumbosacral radiculopathy    3. Lumbar spondylosis    4. Long term (current) use of anticoagulants    5. Cerebrovascular accident (CVA), unspecified mechanism         PLAN:     - Schedule for a Left Transforaminal epidural steroid injection at L5/S1 and S1.  -  Counseled patient regarding the importance of smoking cessation.  - RTC 3 weeks after procedure.    The above plan and management options were discussed at length with patient. Patient is in agreement with the above and verbalized understanding. It will be communicated with the referring physician via electronic record, fax, or mail.    Ad Patel III  08/28/2018

## 2018-08-30 ENCOUNTER — TELEPHONE (OUTPATIENT)
Dept: PAIN MEDICINE | Facility: CLINIC | Age: 63
End: 2018-08-30

## 2018-08-30 NOTE — TELEPHONE ENCOUNTER
Spoke with Patient's daughter, Nisreen.  Per Dr. Lockhart, pt will d/c Plavix 9/4/18, to have procedure 9/11/18 and resume Plavix Wednesday 9/12/18.  She verbalized complete understanding and will relay message to her mother.

## 2018-09-10 ENCOUNTER — TELEPHONE (OUTPATIENT)
Dept: PAIN MEDICINE | Facility: CLINIC | Age: 63
End: 2018-09-10

## 2018-09-10 NOTE — TELEPHONE ENCOUNTER
R/t pt's daughter's call and informed her that the sx center will call with arrival time and that she can call [facility] and speak with sx team. Nisreen stated that she already called and was told someone will call her later today. Informed her to call back and specifically ask for sx center to get time and instructions.

## 2018-09-11 DIAGNOSIS — M47.816 LUMBAR SPONDYLOSIS: ICD-10-CM

## 2018-09-11 DIAGNOSIS — M54.16 LUMBAR RADICULOPATHY: Primary | ICD-10-CM

## 2018-09-11 DIAGNOSIS — M51.36 DDD (DEGENERATIVE DISC DISEASE), LUMBAR: ICD-10-CM

## 2018-09-11 PROBLEM — M51.369 DDD (DEGENERATIVE DISC DISEASE), LUMBAR: Status: ACTIVE | Noted: 2018-09-11

## 2018-10-09 ENCOUNTER — OFFICE VISIT (OUTPATIENT)
Dept: PAIN MEDICINE | Facility: CLINIC | Age: 63
End: 2018-10-09
Payer: MEDICARE

## 2018-10-09 VITALS — SYSTOLIC BLOOD PRESSURE: 132 MMHG | DIASTOLIC BLOOD PRESSURE: 82 MMHG | BODY MASS INDEX: 29.18 KG/M2 | WEIGHT: 170 LBS

## 2018-10-09 DIAGNOSIS — Z79.01 LONG TERM (CURRENT) USE OF ANTICOAGULANTS: ICD-10-CM

## 2018-10-09 DIAGNOSIS — M51.36 DDD (DEGENERATIVE DISC DISEASE), LUMBAR: ICD-10-CM

## 2018-10-09 DIAGNOSIS — M54.17 LUMBOSACRAL RADICULOPATHY: Primary | ICD-10-CM

## 2018-10-09 DIAGNOSIS — M47.816 LUMBAR SPONDYLOSIS: ICD-10-CM

## 2018-10-09 DIAGNOSIS — I63.9 CEREBROVASCULAR ACCIDENT (CVA), UNSPECIFIED MECHANISM: ICD-10-CM

## 2018-10-09 PROCEDURE — 3075F SYST BP GE 130 - 139MM HG: CPT | Mod: CPTII,,, | Performed by: ANESTHESIOLOGY

## 2018-10-09 PROCEDURE — 99213 OFFICE O/P EST LOW 20 MIN: CPT | Mod: S$PBB,,, | Performed by: ANESTHESIOLOGY

## 2018-10-09 PROCEDURE — 99999 PR PBB SHADOW E&M-EST. PATIENT-LVL II: CPT | Mod: PBBFAC,,, | Performed by: ANESTHESIOLOGY

## 2018-10-09 PROCEDURE — 3008F BODY MASS INDEX DOCD: CPT | Mod: CPTII,,, | Performed by: ANESTHESIOLOGY

## 2018-10-09 PROCEDURE — 99212 OFFICE O/P EST SF 10 MIN: CPT | Mod: PBBFAC,PN | Performed by: ANESTHESIOLOGY

## 2018-10-09 PROCEDURE — 3079F DIAST BP 80-89 MM HG: CPT | Mod: CPTII,,, | Performed by: ANESTHESIOLOGY

## 2018-10-09 NOTE — PROGRESS NOTES
Chronic Pain -Established Visit        INTERVAL HISTORY (10/09/2018):    Soila Hairston presents to the clinic today for a follow-up appointment for low back pain. Since the last visit, the pain has been improving. The patient reports 85% improvement in pain and function after Left Transforaminal epidural steroid injection at L5/S1 and S1 which continues. Current pain intensity is 0/10.      INTERVAL UPDATE (08/28/2018):    Soila Hairston returns to clinic for the evaluation of low back pain. She is s/p Left L4/5 AND L5/S1 TRANSFORAMINAL EPIDURAL STEROID INJECTION performed on 7/24/18. She reports 70% improvement after the procedure which lasted approximately 3 weeks.  She is no longer experiencing pain radiating down the leg. She complains today of pain in the left lumbosacral and buttock area. Current pain intensity is 5/10. She describes the pain as aching in nature. The pain is made worse with activity.     SUBJECTIVE (06/20/2018)    Soila Hairston is a 64 y/o female with hx of CVA with residual right sided weakness, HTN, DM, and tobacco use who presents to the clinic for the evaluation of low back pain. The pain started 4 years ago following an injury on the job. The pain is located in the lumbosacral area at the midline and radiates down the left leg into the foot.  The pain is described as aching, throbbing and pounding and is rated as 8/10. The pain is rated with a score of  2/10 on the BEST day and a score of 10/10 on the WORST day.  Symptoms interfere with daily activity. The pain is exacerbated by bending forward at the waist and rising from a seated position.  The pain is mitigated by laying down, medications and injections. She is an established patient of my colleague, Dr. Chen, who performed a lumbar transforaminal KATINA for a similar pain which helped significantly last year.      Patient denies night fever/night sweats, urinary/bowel incontinence and significant weight loss. She reports chronic  right-sided weakness due to CVA.    Physical Therapy/Home Exercise: Yes, in the past    Pain Disability Index Review:  Last 3 PDI Scores 10/9/2018 8/28/2018 6/20/2018   Pain Disability Index (PDI) 8 27 54       Pain Medications:    - Percocet 10/325 BID  - Gabapentin 300 mg TID    Anti-coagulants:    - Plavix 75 mg daily     report: Reviewed    Pain Procedures:   Left Transforaminal epidural steroid injection at L5/S1 and S1 (9/11/2018)  Left L4/5 AND L5/S1 TRANSFORAMINAL EPIDURAL STEROID INJECTION (8/7/2018)  Lumbar TESI with Dr. Chen    Imaging:     X-Ray Lumbar Spine Ap Lateral w/Flex Ext (6/20/2018)    Findings:  There is straightening of the normal lumbar lordosis.  The vertebral body heights are satisfactorily preserved.  There is loss of disc space height with degenerative endplate change and facet hypertrophy from L3-4 through L5-S1.  There is no fracture, dislocation, or bony erosion.  There is extensive arterial vascular calcification.  There is no instability upon flexion extension.  There is no fracture, dislocation, or bony erosion identified.       CT Lumbar Spine (1/7/2018):    Findings: The visualized portion of the intra-abdominal contents is significant scattered vascular calcification. The lumbar spinal alignment the vertebral body heights are satisfactorily preserved. There are minimally displaced bilateral sacral fractures adjacent to the SI joints.      MRI Lumbar Spine (1/31/2017):    Standard multiplanar noncontrast MRI sequences of the lumbar spine.    Tip of the conus is located at T12 level.  No abnormal signal in distal cord.  Small spinal canal congenital basis.  Superimposed disc protrusions at the L3-L4, L4-L5 and L5-S1 levels with significant spinal stenosis.    L1-2: Normal.    L2-3: Normal.    L3-4: Marked disc space narrowing.  Circumferential disc bulge.  Moderate central spinal stenosis.  Bilateral bony neural foraminal narrowing without evidence of definite nerve root  impingement.  Facet hypertrophy.    L4-5: Marked disc space narrowing.  Circumferential disc bulge.  Moderate to severe central spinal stenosis.  Bilateral recess stenosis.  Disc herniation extends inferior to the disc space.  Bilateral bony neural foraminal narrowing with possible bilateral L4 nerve or impingement.  Bilateral facet hypertrophy.    L5-S1:  Marked disc space narrowing.  Circumferential disc bulge.  Asymmetric disc protrusion paramedian greater to the left side with narrowing of the left lateral recess greater than the right lateral recess and possible left-sided S1 nerve root impingement.  Bilateral bony neural foraminal narrowing with possible bilateral L5 nerve root impingements.    Past Medical History:   Diagnosis Date    Anticoagulant long-term use     Depression     Diabetes mellitus, type 2     Foot drop     a couple weeks ago    Hyperlipidemia     Hypertension     SOB (shortness of breath)     Stroke 12/23/2015     Past Surgical History:   Procedure Laterality Date    KATINA-TRANSFORAMINAL Left 11/30/2016    Performed by Latisha Chen MD at [facility]    Injection,steroid,epidural,transforaminal approach LUMBAR L4/5 AND L5/S1 Left 7/24/2018    Performed by Ad Patel III, MD at [facility]    Injection,steroid,epidural,transforaminal approach, LEFT, L5/S1 and S1 Left 9/11/2018    Performed by Ad Patel III, MD at [facility]    INJECTION-STEROID-EPIDURAL-TRANSFORAMINAL Left 9/13/2017    Performed by Latisha Chen MD at [facility]     Social History     Socioeconomic History    Marital status:      Spouse name: Not on file    Number of children: Not on file    Years of education: Not on file    Highest education level: Not on file   Social Needs    Financial resource strain: Not on file    Food insecurity - worry: Not on file    Food insecurity - inability: Not on file    Transportation needs - medical: Not on file    Transportation  "needs - non-medical: Not on file   Occupational History    Not on file   Tobacco Use    Smoking status: Current Some Day Smoker     Packs/day: 0.00     Years: 40.00     Pack years: 0.00     Types: Cigarettes    Smokeless tobacco: Never Used    Tobacco comment: smoke 5 cigs a day   Substance and Sexual Activity    Alcohol use: No     Alcohol/week: 0.0 oz    Drug use: No    Sexual activity: Not Currently   Other Topics Concern    Not on file   Social History Narrative    Not on file     Family History   Problem Relation Age of Onset    Dementia Mother        Review of patient's allergies indicates:  No Known Allergies    Current Outpatient Medications   Medication Sig    alprazolam (XANAX) 2 MG Tab Take 1 tablet (2 mg total) by mouth 2 (two) times daily as needed. Take 1/2 tablet by mouth two times daily as needed.    baclofen (LIORESAL) 10 MG tablet Take 1 tablet (10 mg total) by mouth 3 (three) times daily. (Patient taking differently: Take 10 mg by mouth once daily. )    BD INSULIN PEN NEEDLE UF MINI 31 gauge x 3/16" Ndle     citalopram (CELEXA) 20 MG tablet Take 1 tablet (20 mg total) by mouth once daily.    fluticasone (FLONASE) 50 mcg/actuation nasal spray USE 2 SPRAYS IN EACH NOSTRIL EVERY DAY (Patient taking differently: USE 2 SPRAYS IN EACH NOSTRIL EVERY DAY prn)    furosemide (LASIX) 40 MG tablet Take 40 mg by mouth 2 (two) times daily.    gabapentin (NEURONTIN) 300 MG capsule Take 2 tablets at night for 7 days, then increase to 3 tablets at night. (Patient taking differently: Take 300 mg by mouth every evening. Take 2 tablets at night for 7 days, then increase to 3 tablets at night.)    hydrochlorothiazide (HYDRODIURIL) 25 MG tablet Take 1 tablet (25 mg total) by mouth once daily.    LANTUS SOLOSTAR 100 unit/mL (3 mL) InPn pen Inject 40 Units into the skin once daily.    losartan (COZAAR) 50 MG tablet Take 1 tablet (50 mg total) by mouth once daily.    metoprolol tartrate (LOPRESSOR) 50 " MG tablet Take 50 mg by mouth once daily.    NON FORMULARY MEDICATION Diclofenac/lido/prilocaine 0.15/2.25/2.25% apply 3.2 grams to painful area up to five times a day    oxybutynin (DITROPAN) 5 MG Tab Take 1 tablet (5 mg total) by mouth 3 (three) times daily.    oxyCODONE-acetaminophen (PERCOCET)  mg per tablet Take 1 tablet by mouth every 4 (four) hours as needed for Pain.    simvastatin (ZOCOR) 40 MG tablet Take 1 tablet (40 mg total) by mouth once daily.    TRUETEST TEST STRIPS Strp     umeclidinium brm/vilanterol tr (ANORO ELLIPTA INHL) Inhale into the lungs once daily.    clopidogrel (PLAVIX) 75 mg tablet Take 1 tablet (75 mg total) by mouth once daily.     No current facility-administered medications for this visit.        REVIEW OF SYSTEMS:  GENERAL: No weight loss or fevers.  HEENT: Negative for frequent or significant headaches.  RESPIRATORY: Negative for wheezing or shortness of breath.  CARDIOVASCULAR: Negative for chest pain or palpitations.  GI: No blood in stools or black stools or change in bowel habits.  : Negative for kidney stones, urinary tract infections, or incontinence.  MUSCULOSKELETAL: See HPI  SKIN: Negative for rash or itching.  PSYCH: Negative for sleep disturbance.  HEMATOLOGY/LYMPHOLOGY: takes Plavix  NEURO:  No history of seizures or tremors. Hx of CVA with residual right-sided weakness    OBJECTIVE:    /82   Wt 77.1 kg (170 lb)   BMI 29.18 kg/m²     PHYSICAL EXAMINATION:  GENERAL: Well appearing, in no acute distress.  PSYCH:  Mood and affect is appropriate.  Awake, alert, and oriented x 3.  SKIN: Skin color, texture, turgor normal, no rashes or lesions  HEENT: Normocephalic, atraumatic.  EOM intact.  CV: Radial pulses are 2+.  RESP:  Respirations are unlabored.  MSK:  No atrophy or tone abnormalities are noted.      Neck: No obvious deformity or signs of trauma.  Normal cervical spine range of motion.    Back: No tenderness to palpation over the lumbar spine or  paraspinous muscles. Decreased range of motion on flexion and extension.    Buttocks:  No pain to palpation over the PSIS.     Extremities:  No edema or skin discolorations noted.     Gait:  Gait is antalgic, in wheelchair.    NEUR:  There is weakness on dorsiflexion of right foot. Otherwise, strength testing is 5/5 in lower extremities. No loss of sensation is noted.     ASSESSMENT: 63 y.o. female with chronic left-sided low back and buttock pain which is improving after Left Transforaminal epidural steroid injection at L5/S1 and S1.      1. Lumbosacral radiculopathy    2. DDD (degenerative disc disease), lumbar    3. Lumbar spondylosis    4. Cerebrovascular accident (CVA), unspecified mechanism    5. Long term (current) use of anticoagulants         PLAN:     - I have stressed the importance of physical activity and a home exercise plan.  - Counseled patient regarding the importance of smoking cessation.  - Will repeat Left L5/S1 and S1 Transforaminal epidural steroid injection in the future as needed.  - RTC as needed.    The above plan and management options were discussed at length with patient. Patient is in agreement with the above and verbalized understanding. It will be communicated with the referring physician via electronic record, fax, or mail.    Ad Patel III  10/09/2018

## 2019-02-06 ENCOUNTER — OFFICE VISIT (OUTPATIENT)
Dept: PAIN MEDICINE | Facility: CLINIC | Age: 64
End: 2019-02-06
Payer: MEDICARE

## 2019-02-06 VITALS — DIASTOLIC BLOOD PRESSURE: 64 MMHG | SYSTOLIC BLOOD PRESSURE: 140 MMHG | BODY MASS INDEX: 29.18 KG/M2 | WEIGHT: 170 LBS

## 2019-02-06 DIAGNOSIS — F17.200 SMOKER: Primary | ICD-10-CM

## 2019-02-06 DIAGNOSIS — M51.36 DDD (DEGENERATIVE DISC DISEASE), LUMBAR: ICD-10-CM

## 2019-02-06 DIAGNOSIS — I63.9 CEREBROVASCULAR ACCIDENT (CVA), UNSPECIFIED MECHANISM: ICD-10-CM

## 2019-02-06 DIAGNOSIS — M47.816 LUMBAR SPONDYLOSIS: ICD-10-CM

## 2019-02-06 DIAGNOSIS — M54.17 LUMBOSACRAL RADICULOPATHY: Primary | ICD-10-CM

## 2019-02-06 DIAGNOSIS — R05.9 COUGH: ICD-10-CM

## 2019-02-06 PROCEDURE — 99214 OFFICE O/P EST MOD 30 MIN: CPT | Mod: S$GLB,,, | Performed by: ANESTHESIOLOGY

## 2019-02-06 PROCEDURE — 3008F PR BODY MASS INDEX (BMI) DOCUMENTED: ICD-10-PCS | Mod: CPTII,S$GLB,, | Performed by: ANESTHESIOLOGY

## 2019-02-06 PROCEDURE — 99999 PR PBB SHADOW E&M-EST. PATIENT-LVL II: CPT | Mod: PBBFAC,,, | Performed by: ANESTHESIOLOGY

## 2019-02-06 PROCEDURE — 3078F PR MOST RECENT DIASTOLIC BLOOD PRESSURE < 80 MM HG: ICD-10-PCS | Mod: CPTII,S$GLB,, | Performed by: ANESTHESIOLOGY

## 2019-02-06 PROCEDURE — 99999 PR PBB SHADOW E&M-EST. PATIENT-LVL II: ICD-10-PCS | Mod: PBBFAC,,, | Performed by: ANESTHESIOLOGY

## 2019-02-06 PROCEDURE — 3078F DIAST BP <80 MM HG: CPT | Mod: CPTII,S$GLB,, | Performed by: ANESTHESIOLOGY

## 2019-02-06 PROCEDURE — 3077F SYST BP >= 140 MM HG: CPT | Mod: CPTII,S$GLB,, | Performed by: ANESTHESIOLOGY

## 2019-02-06 PROCEDURE — 99214 PR OFFICE/OUTPT VISIT, EST, LEVL IV, 30-39 MIN: ICD-10-PCS | Mod: S$GLB,,, | Performed by: ANESTHESIOLOGY

## 2019-02-06 PROCEDURE — 3008F BODY MASS INDEX DOCD: CPT | Mod: CPTII,S$GLB,, | Performed by: ANESTHESIOLOGY

## 2019-02-06 PROCEDURE — 3077F PR MOST RECENT SYSTOLIC BLOOD PRESSURE >= 140 MM HG: ICD-10-PCS | Mod: CPTII,S$GLB,, | Performed by: ANESTHESIOLOGY

## 2019-02-06 RX ORDER — GLIMEPIRIDE 2 MG/1
TABLET ORAL
COMMUNITY
End: 2024-03-11

## 2019-02-06 RX ORDER — GLYBURIDE 5 MG/1
5 TABLET ORAL 2 TIMES DAILY
COMMUNITY
End: 2019-02-06 | Stop reason: SDUPTHER

## 2019-02-06 NOTE — PROGRESS NOTES
Chronic Pain -Established Visit      INTERVAL HISTORY (02/06/2019):    Soila Hairston is a 62 y/o female with hx of CVA with residual right-sided weakness, HTN, DM, and tobacco use presents to the clinic today for a follow-up appointment for low back pain. Since the last visit, the back pain has been worsening. The pain is located in the left low back area and radiates down the left leg into the calf. Current pain intensity is 9/10. She describes the pain as sharp. The pain is made worse with standing and walking. The patient reports 85% pain relief after Left L5/S1 and S1 Transforaminal epidural steroid injection performed on 9/11/2018 which lasted 4.5 months. She would like to be scheduled for repeat procedure at this time.    Patient denies night fever/night sweats, urinary/bowel incontinence and significant weight loss. She reports chronic right-sided weakness due to CVA.    Physical Therapy/Home Exercise: Yes, in the past    Pain Disability Index Review:  Last 3 PDI Scores 2/6/2019 10/9/2018 8/28/2018   Pain Disability Index (PDI) 44 8 27       Pain Medications:    - Percocet 10/325 BID as needed  - Gabapentin 300 mg TID    Anti-coagulants:    - Plavix 75 mg daily     report: Reviewed    Pain Procedures:   Left Transforaminal epidural steroid injection at L5/S1 and S1 (9/11/2018)  Left L4/5 AND L5/S1 TRANSFORAMINAL EPIDURAL STEROID INJECTION (8/7/2018)  Lumbar TESI with Dr. Chen    Imaging:     X-Ray Lumbar Spine Ap Lateral w/Flex Ext (6/20/2018)    Findings:  There is straightening of the normal lumbar lordosis.  The vertebral body heights are satisfactorily preserved.  There is loss of disc space height with degenerative endplate change and facet hypertrophy from L3-4 through L5-S1.  There is no fracture, dislocation, or bony erosion.  There is extensive arterial vascular calcification.  There is no instability upon flexion extension.  There is no fracture, dislocation, or bony erosion identified.        CT Lumbar Spine (1/7/2018):    Findings: The visualized portion of the intra-abdominal contents is significant scattered vascular calcification. The lumbar spinal alignment the vertebral body heights are satisfactorily preserved. There are minimally displaced bilateral sacral fractures adjacent to the SI joints.      MRI Lumbar Spine (1/31/2017):    Standard multiplanar noncontrast MRI sequences of the lumbar spine.    Tip of the conus is located at T12 level.  No abnormal signal in distal cord.  Small spinal canal congenital basis.  Superimposed disc protrusions at the L3-L4, L4-L5 and L5-S1 levels with significant spinal stenosis.    L1-2: Normal.    L2-3: Normal.    L3-4: Marked disc space narrowing.  Circumferential disc bulge.  Moderate central spinal stenosis.  Bilateral bony neural foraminal narrowing without evidence of definite nerve root impingement.  Facet hypertrophy.    L4-5: Marked disc space narrowing.  Circumferential disc bulge.  Moderate to severe central spinal stenosis.  Bilateral recess stenosis.  Disc herniation extends inferior to the disc space.  Bilateral bony neural foraminal narrowing with possible bilateral L4 nerve or impingement.  Bilateral facet hypertrophy.    L5-S1:  Marked disc space narrowing.  Circumferential disc bulge.  Asymmetric disc protrusion paramedian greater to the left side with narrowing of the left lateral recess greater than the right lateral recess and possible left-sided S1 nerve root impingement.  Bilateral bony neural foraminal narrowing with possible bilateral L5 nerve root impingements.    Past Medical History:   Diagnosis Date    Anticoagulant long-term use     Depression     Diabetes mellitus, type 2     Foot drop     a couple weeks ago    Hyperlipidemia     Hypertension     SOB (shortness of breath)     Stroke 12/23/2015     Past Surgical History:   Procedure Laterality Date    KATINA-TRANSFORAMINAL Left 11/30/2016    Performed by Latisha KAHN  "MD Gustavo at [facility]    Injection,steroid,epidural,transforaminal approach LUMBAR L4/5 AND L5/S1 Left 7/24/2018    [facility]    Injection,steroid,epidural,transforaminal approach, LEFT, L5/S1 and S1 Left 9/11/2018    [facility]    INJECTION-STEROID-EPIDURAL-TRANSFORAMINAL Left 9/13/2017    Performed by Latisha Chen MD at [facility]     Social History     Socioeconomic History    Marital status:      Spouse name: Not on file    Number of children: Not on file    Years of education: Not on file    Highest education level: Not on file   Social Needs    Financial resource strain: Not on file    Food insecurity - worry: Not on file    Food insecurity - inability: Not on file    Transportation needs - medical: Not on file    Transportation needs - non-medical: Not on file   Occupational History    Not on file   Tobacco Use    Smoking status: Current Some Day Smoker     Packs/day: 0.00     Years: 40.00     Pack years: 0.00     Types: Cigarettes    Smokeless tobacco: Never Used    Tobacco comment: smoke 5 cigs a day   Substance and Sexual Activity    Alcohol use: No     Alcohol/week: 0.0 oz    Drug use: No    Sexual activity: Not Currently   Other Topics Concern    Not on file   Social History Narrative    Not on file     Family History   Problem Relation Age of Onset    Dementia Mother        Review of patient's allergies indicates:  No Known Allergies    Current Outpatient Medications   Medication Sig    alprazolam (XANAX) 2 MG Tab Take 1 tablet (2 mg total) by mouth 2 (two) times daily as needed. Take 1/2 tablet by mouth two times daily as needed.    baclofen (LIORESAL) 10 MG tablet Take 1 tablet (10 mg total) by mouth 3 (three) times daily. (Patient taking differently: Take 10 mg by mouth once daily. )    BD INSULIN PEN NEEDLE UF MINI 31 gauge x 3/16" Ndle     citalopram (CELEXA) 20 " MG tablet Take 1 tablet (20 mg total) by mouth once daily.    clopidogrel (PLAVIX) 75 mg tablet Take 1 tablet (75 mg total) by mouth once daily.    fluticasone (FLONASE) 50 mcg/actuation nasal spray USE 2 SPRAYS IN EACH NOSTRIL EVERY DAY (Patient taking differently: USE 2 SPRAYS IN EACH NOSTRIL EVERY DAY prn)    furosemide (LASIX) 40 MG tablet Take 40 mg by mouth 2 (two) times daily.    gabapentin (NEURONTIN) 300 MG capsule Take 2 tablets at night for 7 days, then increase to 3 tablets at night. (Patient taking differently: Take 300 mg by mouth every evening. Take 2 tablets at night for 7 days, then increase to 3 tablets at night.)    glimepiride (AMARYL) 2 MG tablet glimepiride 2 mg tablet    hydrochlorothiazide (HYDRODIURIL) 25 MG tablet Take 1 tablet (25 mg total) by mouth once daily.    LANTUS SOLOSTAR 100 unit/mL (3 mL) InPn pen Inject 40 Units into the skin once daily.    losartan (COZAAR) 50 MG tablet Take 1 tablet (50 mg total) by mouth once daily.    metoprolol tartrate (LOPRESSOR) 50 MG tablet Take 50 mg by mouth once daily.    NON FORMULARY MEDICATION Diclofenac/lido/prilocaine 0.15/2.25/2.25% apply 3.2 grams to painful area up to five times a day    oxybutynin (DITROPAN) 5 MG Tab Take 1 tablet (5 mg total) by mouth 3 (three) times daily.    oxyCODONE-acetaminophen (PERCOCET)  mg per tablet Take 1 tablet by mouth every 4 (four) hours as needed for Pain.    simvastatin (ZOCOR) 40 MG tablet Take 1 tablet (40 mg total) by mouth once daily.    TRUETEST TEST STRIPS Strp     umeclidinium brm/vilanterol tr (ANORO ELLIPTA INHL) Inhale into the lungs once daily.     No current facility-administered medications for this visit.        REVIEW OF SYSTEMS:  GENERAL: No weight loss or fevers.  HEENT: Negative for frequent or significant headaches.  RESPIRATORY: Negative for wheezing or shortness of breath.  CARDIOVASCULAR: Negative for chest pain or palpitations.  GI: No blood in stools or black  stools or change in bowel habits.  : Negative for kidney stones, urinary tract infections, or incontinence.  MUSCULOSKELETAL: See HPI  SKIN: Negative for rash or itching.  PSYCH: Negative for sleep disturbance.  HEMATOLOGY/LYMPHOLOGY: takes Plavix  NEURO:  No history of seizures or tremors. Hx of CVA with residual right-sided weakness    OBJECTIVE:    BP (!) 140/64   Wt 77.1 kg (170 lb)   BMI 29.18 kg/m²     PHYSICAL EXAMINATION:  GENERAL: Well appearing, in no acute distress.  PSYCH:  Mood and affect is appropriate.  Awake, alert, and oriented x 3.  SKIN: Skin color, texture, turgor normal, no rashes or lesions  HEENT: Normocephalic, atraumatic.  EOM intact.  CV: Radial pulses are 2+.  RESP:  Respirations are unlabored.  MSK:  No atrophy or tone abnormalities are noted.      Neck: No obvious deformity or signs of trauma.  Normal cervical spine range of motion.    Back: SLR produces radicular pain on left side. No tenderness to palpation over the lumbar spine or paraspinous muscles. Decreased range of motion on flexion and extension.    Buttocks:  No pain to palpation over the PSIS.     Extremities:  No edema or skin discolorations noted.     Gait:  Gait is antalgic, in wheelchair.    NEUR:  There is weakness on dorsiflexion of right foot. Otherwise, strength testing is 5/5 in lower extremities. No loss of sensation is noted.     ASSESSMENT:     1. Lumbosacral radiculopathy    2. DDD (degenerative disc disease), lumbar    3. Lumbar spondylosis    4. Cerebrovascular accident (CVA), unspecified mechanism           PLAN:     - I have stressed the importance of physical activity and a home exercise plan.  - Continue current medications.  - Counseled patient regarding the importance of smoking cessation.  - Schedule for Left L5/S1 and S1 Transforaminal epidural steroid injection.  - RTC after procedure.    The above plan and management options were discussed at length with patient. Patient is in agreement with the  above and verbalized understanding. It will be communicated with the referring physician via electronic record, fax, or mail.    Ad Patel III  02/06/2019

## 2019-02-07 ENCOUNTER — TELEPHONE (OUTPATIENT)
Dept: PAIN MEDICINE | Facility: CLINIC | Age: 64
End: 2019-02-07

## 2019-02-07 DIAGNOSIS — M54.16 LUMBAR RADICULOPATHY: Primary | ICD-10-CM

## 2019-04-29 DIAGNOSIS — Z12.31 VISIT FOR SCREENING MAMMOGRAM: Primary | ICD-10-CM

## 2019-05-27 ENCOUNTER — HOSPITAL ENCOUNTER (OUTPATIENT)
Dept: RADIOLOGY | Facility: HOSPITAL | Age: 64
Discharge: HOME | End: 2019-05-27
Attending: FAMILY MEDICINE
Payer: MEDICARE

## 2019-05-27 VITALS — HEIGHT: 64 IN | WEIGHT: 186 LBS | BODY MASS INDEX: 31.76 KG/M2

## 2019-05-27 DIAGNOSIS — Z12.31 VISIT FOR SCREENING MAMMOGRAM: ICD-10-CM

## 2019-05-27 PROCEDURE — 77067 SCR MAMMO BI INCL CAD: CPT | Mod: TC,PO

## 2019-06-19 ENCOUNTER — TELEPHONE (OUTPATIENT)
Dept: PAIN MEDICINE | Facility: CLINIC | Age: 64
End: 2019-06-19

## 2019-06-19 NOTE — TELEPHONE ENCOUNTER
Nurse contacted patient and family to inform them that we are unable to just send records to any facility we would need a records release with the patients signature to send records or she may go to medical records to request them. Patient and family verbalized understanding.        ----- Message from Jie Kingston sent at 6/19/2019 11:13 AM CDT -----  Patient's daughter, Nisreen, called.   No. [phone] or [phone]    Patient is going to see a spine specialist.   Please fax her records to the doctor at no. [phone]   Attention:   Nicole Nielson did not know the doctor's name.

## 2019-07-19 ENCOUNTER — TELEPHONE (OUTPATIENT)
Dept: GASTROENTEROLOGY | Facility: CLINIC | Age: 64
End: 2019-07-19

## 2019-07-19 NOTE — TELEPHONE ENCOUNTER
----- Message from Robert English sent at 7/19/2019  8:14 AM CDT -----  Contact: Nisreen (daughter)/[phone]  Patient would like to be scheduled for a colonoscopy.  Patient says she called on yesterday and received no response and is very alarmed as to why.    Please call [phone] to discuss today.

## 2019-09-19 ENCOUNTER — OFFICE VISIT (OUTPATIENT)
Dept: GASTROENTEROLOGY | Facility: CLINIC | Age: 64
End: 2019-09-19
Payer: MEDICARE

## 2019-09-19 ENCOUNTER — TELEPHONE (OUTPATIENT)
Dept: GASTROENTEROLOGY | Facility: CLINIC | Age: 64
End: 2019-09-19

## 2019-09-19 DIAGNOSIS — R14.0 BLOATING: ICD-10-CM

## 2019-09-19 DIAGNOSIS — D50.0 IRON DEFICIENCY ANEMIA DUE TO CHRONIC BLOOD LOSS: Primary | ICD-10-CM

## 2019-09-19 DIAGNOSIS — K59.00 CONSTIPATION, UNSPECIFIED CONSTIPATION TYPE: ICD-10-CM

## 2019-09-19 DIAGNOSIS — K21.9 GASTROESOPHAGEAL REFLUX DISEASE, ESOPHAGITIS PRESENCE NOT SPECIFIED: ICD-10-CM

## 2019-09-19 PROCEDURE — 99205 OFFICE O/P NEW HI 60 MIN: CPT | Mod: S$GLB,,, | Performed by: INTERNAL MEDICINE

## 2019-09-19 PROCEDURE — 99999 PR PBB SHADOW E&M-EST. PATIENT-LVL III: CPT | Mod: PBBFAC,,, | Performed by: INTERNAL MEDICINE

## 2019-09-19 PROCEDURE — 99999 PR PBB SHADOW E&M-EST. PATIENT-LVL III: ICD-10-PCS | Mod: PBBFAC,,, | Performed by: INTERNAL MEDICINE

## 2019-09-19 PROCEDURE — 99205 PR OFFICE/OUTPT VISIT, NEW, LEVL V, 60-74 MIN: ICD-10-PCS | Mod: S$GLB,,, | Performed by: INTERNAL MEDICINE

## 2019-09-19 RX ORDER — LEVOCETIRIZINE DIHYDROCHLORIDE 5 MG/1
5 TABLET, FILM COATED ORAL NIGHTLY
COMMUNITY

## 2019-09-19 RX ORDER — FERROUS GLUCONATE 324(38)MG
324 TABLET ORAL
COMMUNITY

## 2019-09-19 RX ORDER — POLYETHYLENE GLYCOL 3350, SODIUM SULFATE ANHYDROUS, SODIUM BICARBONATE, SODIUM CHLORIDE, POTASSIUM CHLORIDE 236; 22.74; 6.74; 5.86; 2.97 G/4L; G/4L; G/4L; G/4L; G/4L
4 POWDER, FOR SOLUTION ORAL SEE ADMIN INSTRUCTIONS
Qty: 4000 ML | Refills: 0 | Status: SHIPPED | OUTPATIENT
Start: 2019-09-19 | End: 2023-08-18

## 2019-09-19 NOTE — PATIENT INSTRUCTIONS
2 DAY GOLYTELY Instructions    You are scheduled for a colonoscopy with Dr. Schultz on 10/19/19 at [facility] located at [address] [place] [address].  Check in at the admit desk, first floor of the hospital (which is the building on the left).     You will receive a call 2-3 days before your colonoscopy to tell you the time to arrive.  If you have not received a call by the day before your procedure, call the Endoscopy Lab at [phone].    To ensure that your test is accurate and complete, you MUST follow these instructions listed below.  If you have any questions, please call our office at [phone].  Plan on being at the hospital for your procedure for 3-4 hours. Please contact the office at [phone] two days before procedure date to reschedule if needed.    2 DAYS BEFORE YOUR COLONOSCOPY:   1.  Eat a normal breakfast and lunch, but after lunch you will start your CLEAR LIQUID DIET.  FOR SUPPER/DINNER YOU WILL HAVE CLEAR LIQUIDS.    CLEAR LIQUID DIET:   - Avoid Red, Orange, Purple, and/or Blue food coloring   - NO DAIRY   - You can have:  Coffee with sugar (no creamer), tea, water, soda, apple or white grape juice, chicken or beef broth/bouillon (no meat, noodles, or veggies), green/yellow popsicles, green/yellow Jell-O, lemonade.     2.  At 4pm, drink ONE ENTIRE BOTTLE OF MAGNESIUM CITRATE     1 DAY BEFORE YOUR COLONOSCOPY:  1.  Follow a CLEAR LIQUID DIET for the entire day before your scheduled colonoscopy. This means no solid food the entire day starting when you wake.  You may have as much of the clear liquids as you want throughout the day.   CLEAR LIQUID DIET:   - Avoid Red, Orange, Purple, and/or Blue food coloring   - NO DAIRY   - You can have:  Coffee with sugar (no creamer), tea, water, soda, apple or white grape juice, chicken or beef broth/bouillon (no meat, noodles, or veggies), green/yellow popsicles, green/yellow Jell-O, lemonade.    2.  MIX GOLYTELY/COLYTE/NULYTELY (all names  for same product) WITH ONE (1) GALLON OF WATER.  YOU MAY ADD A FLAVOR PACKET OR YELLOW/GREEN POWDER DRINK MIX TO THIS.  PUT IN REFRIGERATOR.  This is easier to drink if this solution is cold, so you can mix the solution one day ahead of time and place in the refrigerator prior to drinking.  You have to drink the solution within 24-36 hours of mixing it.  Do NOT put this solution over ice.  It IS ok to drink with a straw.    3. AT 5 PM THE DAY BEFORE YOUR COLONOSCOPY, DRINK ONE (1) 8 OUNCE GLASS OF MIXTURE EVERY 10 MINUTES UNTIL HALF OF THE GALLON IS CONSUMED.  Keep this mixture cold and in refrigerator as much as you can while drinking it.  Place the remaining half of mixture in the refrigerator when you finish the first half.    4.  The endoscopy department will call you 2 days before your colonoscopy to tell you the exact time to arrive, AND to tell you the exact time to drink the 2nd portion of your prep (which will be FIVE HOURS BEFORE YOUR ARRIVAL TIME).  At this time given to you, DRINK ONE (1) 8 OUNCE GLASS OF MIXTURE EVERY 10 MINUTES UNTIL THE OTHER HALF IS CONSUMED. Keep the mixture cold while you are drinking it. Once this is complete, you may not have ANYTHING else by mouth!    5.  You must have someone with you to DRIVE YOU HOME since you will be receiving IV sedation for the colonoscopy.    6.  It is ok to take your heart, blood pressure, and seizure medications in the morning of your test with a SIP of water.  Hold other medications until after your procedure.  Do NOT have anything else to eat or drink the morning of your colonoscopy.  It is ok to brush your teeth.    7.  If you are on blood thinners THAT YOU HAVE BEEN INSTRUCTED TO HOLD BY YOUR DOCTOR FOR THIS PROCEDURE, then do NOT take this the morning of your colonoscopy.  Do NOT stop these medications on your own, they must be approved to be held by your doctor.  Your colonoscopy can NOT be done if you are on these medications.  Examples of blood  thinners include: Coumadin, Aggrenox, Plavix, Pradaxa, Reapro, Pletal, Xarelto, Ticagrelor, Brilinta, Eliquis, and high dose aspirin (325 mg).  You do not have to stop baby aspirin 81 mg.    8.  IF YOU ARE DIABETIC:  NO INSULIN OR ORAL MEDICATIONS THE MORNING OF THE COLONOSCOPY.  TAKE ONLY HALF THE DOSE OF YOUR INSULIN THE DAY BEFORE THE COLONOSCOPY.  DO NOT TAKE ANY ORAL DIABETIC MEDICATIONS THE DAY BEFORE THE COLONOSCOPY.  IF YOU ARE AN INSULIN DEPENDENT DIABETIC WITH UNSTABLE BLOOD SUGARS, NOTIFY YOUR PRIMARY CARE PHYSICIAN FOR INSTRUCTIONS.

## 2019-09-19 NOTE — PROGRESS NOTES
GASTROENTEROLOGY CLINIC NOTE    Reason for visit: The primary encounter diagnosis was Iron deficiency anemia due to chronic blood loss. Diagnoses of Gastroesophageal reflux disease, esophagitis presence not specified, Bloating, and Constipation, unspecified constipation type were also pertinent to this visit.  Referring provider/PCP: Jc Lockhart MD      HPI:  Soila Hairston is a 64 y.o. female here today for evaluation of anemia.   HX of CVA and right hemiparesis, DM. On plavix. On iron as well.  On opioids. Arrives > 20 minutes late for todays appt, accompanied by daugther who provides some of history as well.    Patient with intermittent symptoms of reflux.  She has never been on a chronic medication for this.  She has retrosternal burning at times, worse with lying flat.  She does not suffer daily with the symptoms.  She has tried some of her daughter's Prilosec and did get good relief.  She has only taken a few of these pills over the past.  She has also tried Tums and Zantac without as much relief.  Currently she is not taking any medications for this.  She has associated abdominal bloating as well. She has constipation. She tries to take lactulose p.r.n. for this.  She has taken Dulcolax in the past for this with decent relief although it made her have significant diarrhea.  She denies melena or blood in the stool.  She is currently taking iron as well as chronic opioids.  She has never had an upper or lower endoscopy.    No family history of colorectal cancer or other GI issues.      GI ROS:  Reflux: ++  Dysphagia: No   Constipation: ++  Diarrhea: No  Rectal bleeding/Melena/hematemesis: No  NSAIDs: No  Anticoagulation: No  Anti-platelet therapy: yes plavix.    Prior Endoscopy:  EGD: Colon: none    (Portions of this note were dictated using voice recognition software and may contain dictation related errors in spelling/grammar/syntax not found on text review)    Review of Systems   Constitutional:  "Negative for fever and weight loss.   HENT: Negative for nosebleeds and sore throat.    Eyes: Negative for double vision and photophobia.   Respiratory: Negative for cough and shortness of breath.    Cardiovascular: Negative for chest pain and leg swelling.   Gastrointestinal: Positive for constipation and heartburn. Negative for blood in stool and melena.   Genitourinary: Negative for dysuria and hematuria.   Musculoskeletal: Negative for joint pain and neck pain.   Skin: Negative for itching and rash.   Neurological: Positive for focal weakness. Negative for dizziness, weakness and headaches.   Psychiatric/Behavioral: Negative for hallucinations. The patient does not have insomnia.        Past Medical History: has a past medical history of Anticoagulant long-term use, Depression, Diabetes mellitus, type 2, Foot drop, Hyperlipidemia, Hypertension, SOB (shortness of breath), and Stroke.    Past Surgical History: has a past surgical history that includes Epidural steroid injection; Abscess drainage (Right); and Transforaminal epidural injection of steroid (Left, 2/20/2019).    Family History:family history includes Breast cancer in her mother; Dementia in her mother.    Allergies: Review of patient's allergies indicates:  No Known Allergies    Social History: reports that she has been smoking cigarettes.  She has a 10.00 pack-year smoking history. She has never used smokeless tobacco. She reports that she does not drink alcohol or use drugs.    Home medications:   Current Outpatient Medications on File Prior to Visit   Medication Sig Dispense Refill    baclofen (LIORESAL) 10 MG tablet Take 1 tablet (10 mg total) by mouth 3 (three) times daily. (Patient taking differently: Take 10 mg by mouth once daily. ) 90 tablet 3    BD INSULIN PEN NEEDLE UF MINI 31 gauge x 3/16" Ndle       citalopram (CELEXA) 20 MG tablet Take 1 tablet (20 mg total) by mouth once daily. 30 tablet 3    ferrous gluconate (FERGON) 324 MG tablet " Take 324 mg by mouth daily with breakfast.      fluticasone (FLONASE) 50 mcg/actuation nasal spray USE 2 SPRAYS IN EACH NOSTRIL EVERY DAY (Patient taking differently: USE 2 SPRAYS IN EACH NOSTRIL EVERY DAY prn) 48 g 3    glimepiride (AMARYL) 2 MG tablet glimepiride 2 mg tablet bid      hydrochlorothiazide (HYDRODIURIL) 25 MG tablet Take 1 tablet (25 mg total) by mouth once daily. 30 tablet 3    LANTUS SOLOSTAR 100 unit/mL (3 mL) InPn pen Inject 40 Units into the skin once daily. 1 Box 3    levocetirizine (XYZAL) 5 MG tablet Take 5 mg by mouth every evening.      losartan (COZAAR) 50 MG tablet Take 1 tablet (50 mg total) by mouth once daily. 30 tablet 3    metoprolol tartrate (LOPRESSOR) 50 MG tablet Take 50 mg by mouth once daily.      NON FORMULARY MEDICATION Diclofenac/lido/prilocaine 0.15/2.25/2.25% apply 3.2 grams to painful area up to five times a day      oxybutynin (DITROPAN) 5 MG Tab Take 1 tablet (5 mg total) by mouth 3 (three) times daily. 90 tablet 3    oxyCODONE-acetaminophen (PERCOCET)  mg per tablet Take 1 tablet by mouth every 4 (four) hours as needed for Pain. 18 tablet 0    simvastatin (ZOCOR) 40 MG tablet Take 1 tablet (40 mg total) by mouth once daily. 30 tablet 3    TRUETEST TEST STRIPS Strp       umeclidinium brm/vilanterol tr (ANORO ELLIPTA INHL) Inhale into the lungs once daily.      alprazolam (XANAX) 2 MG Tab Take 1 tablet (2 mg total) by mouth 2 (two) times daily as needed. Take 1/2 tablet by mouth two times daily as needed. 28 tablet 0    clopidogrel (PLAVIX) 75 mg tablet Take 1 tablet (75 mg total) by mouth once daily. 30 tablet 11    furosemide (LASIX) 40 MG tablet Take 40 mg by mouth 2 (two) times daily.      gabapentin (NEURONTIN) 300 MG capsule Take 2 tablets at night for 7 days, then increase to 3 tablets at night. (Patient taking differently: Take 300 mg by mouth every evening. Take 2 tablets at night for 7 days, then increase to 3 tablets at night.) 90  capsule 11     No current facility-administered medications on file prior to visit.        Vital signs:  There were no vitals taken for this visit.    Physical Exam   Constitutional: She is oriented to person, place, and time. No distress.   In wheelchair   HENT:   Head: Normocephalic and atraumatic.   Eyes: Conjunctivae are normal. No scleral icterus.   Neck: Neck supple. No thyromegaly present.   Cardiovascular: Normal rate, normal heart sounds and intact distal pulses.   Pulmonary/Chest: Effort normal and breath sounds normal. No stridor. No respiratory distress.   Abdominal: Soft. Bowel sounds are normal. She exhibits no distension and no mass. There is no tenderness. There is no rebound and no guarding.   Musculoskeletal: She exhibits no tenderness or deformity.   Neurological: She is alert and oriented to person, place, and time. Gait normal.   Right sided weakness   Skin: Skin is warm and dry. No rash noted. She is not diaphoretic.   Psychiatric: She has a normal mood and affect. Her behavior is normal.   Vitals reviewed.      Routine labs:  No results found for: WBC, HGB, HCT, MCV, PLT  No results found for: INR  No results found for: IRON, FERRITIN, TIBC, FESATURATED  Lab Results   Component Value Date    CREATININE 1.0 03/04/2016     No results found for: ALBUMIN, ALT, AST, GGT, ALKPHOS, BILITOT  No results found for: GLUCOSE    Imaging:  Reviewed  I will request labs and records from Dr. Lockhart office and we will need clearance to come off plavix x 5 days.      Assessment:    1. Iron deficiency anemia due to chronic blood loss    2. Gastroesophageal reflux disease, esophagitis presence not specified    3. Bloating    4. Constipation, unspecified constipation type        Plan:    Orders Placed This Encounter    polyethylene glycol (GOLYTELY,NULYTELY) 236-22.74-6.74 -5.86 gram suspension    Case request GI: EGD (ESOPHAGOGASTRODUODENOSCOPY), COLONOSCOPY     Iron deficiency anemia on iron replacement.  I  will obtain records from her recent lab reports.  We will proceed with upper and lower endoscopy.  Higher than average risk given hx of stroke and use of blood thinner.  Will give a 2 day prep.      RTC after endoscopies.      Adryan Schultz MD  [facility]

## 2019-09-19 NOTE — TELEPHONE ENCOUNTER
Fax sent to   Dr. Jc Lockhart ([facility])  #: [phone]  Fax: [phone]  Requesting clearance to hold Plavix 5 days prior to procedure. Recent labs requested also.

## 2019-09-19 NOTE — H&P (VIEW-ONLY)
GASTROENTEROLOGY CLINIC NOTE    Reason for visit: The primary encounter diagnosis was Iron deficiency anemia due to chronic blood loss. Diagnoses of Gastroesophageal reflux disease, esophagitis presence not specified, Bloating, and Constipation, unspecified constipation type were also pertinent to this visit.  Referring provider/PCP: Jc Lockhart MD      HPI:  Soila Hairston is a 64 y.o. female here today for evaluation of anemia.   HX of CVA and right hemiparesis, DM. On plavix. On iron as well.  On opioids. Arrives > 20 minutes late for todays appt, accompanied by daugther who provides some of history as well.    Patient with intermittent symptoms of reflux.  She has never been on a chronic medication for this.  She has retrosternal burning at times, worse with lying flat.  She does not suffer daily with the symptoms.  She has tried some of her daughter's Prilosec and did get good relief.  She has only taken a few of these pills over the past.  She has also tried Tums and Zantac without as much relief.  Currently she is not taking any medications for this.  She has associated abdominal bloating as well. She has constipation. She tries to take lactulose p.r.n. for this.  She has taken Dulcolax in the past for this with decent relief although it made her have significant diarrhea.  She denies melena or blood in the stool.  She is currently taking iron as well as chronic opioids.  She has never had an upper or lower endoscopy.    No family history of colorectal cancer or other GI issues.      GI ROS:  Reflux: ++  Dysphagia: No   Constipation: ++  Diarrhea: No  Rectal bleeding/Melena/hematemesis: No  NSAIDs: No  Anticoagulation: No  Anti-platelet therapy: yes plavix.    Prior Endoscopy:  EGD: Colon: none    (Portions of this note were dictated using voice recognition software and may contain dictation related errors in spelling/grammar/syntax not found on text review)    Review of Systems   Constitutional:  "Negative for fever and weight loss.   HENT: Negative for nosebleeds and sore throat.    Eyes: Negative for double vision and photophobia.   Respiratory: Negative for cough and shortness of breath.    Cardiovascular: Negative for chest pain and leg swelling.   Gastrointestinal: Positive for constipation and heartburn. Negative for blood in stool and melena.   Genitourinary: Negative for dysuria and hematuria.   Musculoskeletal: Negative for joint pain and neck pain.   Skin: Negative for itching and rash.   Neurological: Positive for focal weakness. Negative for dizziness, weakness and headaches.   Psychiatric/Behavioral: Negative for hallucinations. The patient does not have insomnia.        Past Medical History: has a past medical history of Anticoagulant long-term use, Depression, Diabetes mellitus, type 2, Foot drop, Hyperlipidemia, Hypertension, SOB (shortness of breath), and Stroke.    Past Surgical History: has a past surgical history that includes Epidural steroid injection; Abscess drainage (Right); and Transforaminal epidural injection of steroid (Left, 2/20/2019).    Family History:family history includes Breast cancer in her mother; Dementia in her mother.    Allergies: Review of patient's allergies indicates:  No Known Allergies    Social History: reports that she has been smoking cigarettes.  She has a 10.00 pack-year smoking history. She has never used smokeless tobacco. She reports that she does not drink alcohol or use drugs.    Home medications:   Current Outpatient Medications on File Prior to Visit   Medication Sig Dispense Refill    baclofen (LIORESAL) 10 MG tablet Take 1 tablet (10 mg total) by mouth 3 (three) times daily. (Patient taking differently: Take 10 mg by mouth once daily. ) 90 tablet 3    BD INSULIN PEN NEEDLE UF MINI 31 gauge x 3/16" Ndle       citalopram (CELEXA) 20 MG tablet Take 1 tablet (20 mg total) by mouth once daily. 30 tablet 3    ferrous gluconate (FERGON) 324 MG tablet " Take 324 mg by mouth daily with breakfast.      fluticasone (FLONASE) 50 mcg/actuation nasal spray USE 2 SPRAYS IN EACH NOSTRIL EVERY DAY (Patient taking differently: USE 2 SPRAYS IN EACH NOSTRIL EVERY DAY prn) 48 g 3    glimepiride (AMARYL) 2 MG tablet glimepiride 2 mg tablet bid      hydrochlorothiazide (HYDRODIURIL) 25 MG tablet Take 1 tablet (25 mg total) by mouth once daily. 30 tablet 3    LANTUS SOLOSTAR 100 unit/mL (3 mL) InPn pen Inject 40 Units into the skin once daily. 1 Box 3    levocetirizine (XYZAL) 5 MG tablet Take 5 mg by mouth every evening.      losartan (COZAAR) 50 MG tablet Take 1 tablet (50 mg total) by mouth once daily. 30 tablet 3    metoprolol tartrate (LOPRESSOR) 50 MG tablet Take 50 mg by mouth once daily.      NON FORMULARY MEDICATION Diclofenac/lido/prilocaine 0.15/2.25/2.25% apply 3.2 grams to painful area up to five times a day      oxybutynin (DITROPAN) 5 MG Tab Take 1 tablet (5 mg total) by mouth 3 (three) times daily. 90 tablet 3    oxyCODONE-acetaminophen (PERCOCET)  mg per tablet Take 1 tablet by mouth every 4 (four) hours as needed for Pain. 18 tablet 0    simvastatin (ZOCOR) 40 MG tablet Take 1 tablet (40 mg total) by mouth once daily. 30 tablet 3    TRUETEST TEST STRIPS Strp       umeclidinium brm/vilanterol tr (ANORO ELLIPTA INHL) Inhale into the lungs once daily.      alprazolam (XANAX) 2 MG Tab Take 1 tablet (2 mg total) by mouth 2 (two) times daily as needed. Take 1/2 tablet by mouth two times daily as needed. 28 tablet 0    clopidogrel (PLAVIX) 75 mg tablet Take 1 tablet (75 mg total) by mouth once daily. 30 tablet 11    furosemide (LASIX) 40 MG tablet Take 40 mg by mouth 2 (two) times daily.      gabapentin (NEURONTIN) 300 MG capsule Take 2 tablets at night for 7 days, then increase to 3 tablets at night. (Patient taking differently: Take 300 mg by mouth every evening. Take 2 tablets at night for 7 days, then increase to 3 tablets at night.) 90  capsule 11     No current facility-administered medications on file prior to visit.        Vital signs:  There were no vitals taken for this visit.    Physical Exam   Constitutional: She is oriented to person, place, and time. No distress.   In wheelchair   HENT:   Head: Normocephalic and atraumatic.   Eyes: Conjunctivae are normal. No scleral icterus.   Neck: Neck supple. No thyromegaly present.   Cardiovascular: Normal rate, normal heart sounds and intact distal pulses.   Pulmonary/Chest: Effort normal and breath sounds normal. No stridor. No respiratory distress.   Abdominal: Soft. Bowel sounds are normal. She exhibits no distension and no mass. There is no tenderness. There is no rebound and no guarding.   Musculoskeletal: She exhibits no tenderness or deformity.   Neurological: She is alert and oriented to person, place, and time. Gait normal.   Right sided weakness   Skin: Skin is warm and dry. No rash noted. She is not diaphoretic.   Psychiatric: She has a normal mood and affect. Her behavior is normal.   Vitals reviewed.      Routine labs:  No results found for: WBC, HGB, HCT, MCV, PLT  No results found for: INR  No results found for: IRON, FERRITIN, TIBC, FESATURATED  Lab Results   Component Value Date    CREATININE 1.0 03/04/2016     No results found for: ALBUMIN, ALT, AST, GGT, ALKPHOS, BILITOT  No results found for: GLUCOSE    Imaging:  Reviewed  I will request labs and records from Dr. Lockhart office and we will need clearance to come off plavix x 5 days.      Assessment:    1. Iron deficiency anemia due to chronic blood loss    2. Gastroesophageal reflux disease, esophagitis presence not specified    3. Bloating    4. Constipation, unspecified constipation type        Plan:    Orders Placed This Encounter    polyethylene glycol (GOLYTELY,NULYTELY) 236-22.74-6.74 -5.86 gram suspension    Case request GI: EGD (ESOPHAGOGASTRODUODENOSCOPY), COLONOSCOPY     Iron deficiency anemia on iron replacement.  I  will obtain records from her recent lab reports.  We will proceed with upper and lower endoscopy.  Higher than average risk given hx of stroke and use of blood thinner.  Will give a 2 day prep.      RTC after endoscopies.      Adryan Schultz MD  [facility]

## 2019-09-20 ENCOUNTER — TELEPHONE (OUTPATIENT)
Dept: GASTROENTEROLOGY | Facility: CLINIC | Age: 64
End: 2019-09-20

## 2019-09-20 NOTE — TELEPHONE ENCOUNTER
Cardiac clearance fax received and scanned into media. Patient has been advised to stop Plavix on 10/13/19. Verbal Understanding.

## 2019-10-14 ENCOUNTER — TELEPHONE (OUTPATIENT)
Dept: GASTROENTEROLOGY | Facility: CLINIC | Age: 64
End: 2019-10-14

## 2019-10-14 NOTE — TELEPHONE ENCOUNTER
----- Message from Alysia Arthur sent at 10/14/2019 11:19 AM CDT -----  Contact: pt  Pt would like to be called back regarding her surgery    Pt can be reached at [phone]

## 2019-10-16 ENCOUNTER — TELEPHONE (OUTPATIENT)
Dept: ENDOSCOPY | Facility: HOSPITAL | Age: 64
End: 2019-10-16

## 2019-10-16 NOTE — TELEPHONE ENCOUNTER
Left message instructing patient to call dept @ [phone] between 8am-4pm.    Arrival time to be given @ 1200  No Portal.

## 2019-10-16 NOTE — TELEPHONE ENCOUNTER
Spoke with patient about arrival time @ 1200.     4pm today; drink one bottle of magnesium citrate.    Prep instructions reviewed: the day before the procedure, follow a clear liquid diet all day, then start the first 1/2 of prep at 5pm and take 2nd 1/2 of prep @ 0530.  Pt must be completely NPO when prep completed @ 0700.              Medications: Do not take Insulin or oral diabetic medications the day of the procedure.  Take as prescribed: heart, seizure and blood pressure medication in the morning with a sip of water (less than an ounce).  Take any breathing medications and bring inhalers to hospital with you Leave all valuables and jewelry at home.     Wear comfortable clothes to procedure to change into hospital gown You cannot drive for 24 hours after your procedure because you will receive sedation for your procedure to make you comfortable.  A ride must be provided at discharge.

## 2019-10-18 ENCOUNTER — HOSPITAL ENCOUNTER (OUTPATIENT)
Facility: HOSPITAL | Age: 64
Discharge: HOME | End: 2019-10-18
Attending: INTERNAL MEDICINE | Admitting: INTERNAL MEDICINE
Payer: MEDICARE

## 2019-10-18 ENCOUNTER — ANESTHESIA EVENT (OUTPATIENT)
Dept: ENDOSCOPY | Facility: HOSPITAL | Age: 64
End: 2019-10-18
Payer: MEDICARE

## 2019-10-18 ENCOUNTER — ANESTHESIA (OUTPATIENT)
Dept: ENDOSCOPY | Facility: HOSPITAL | Age: 64
End: 2019-10-18
Payer: MEDICARE

## 2019-10-18 DIAGNOSIS — D50.9 IRON DEFICIENCY ANEMIA, UNSPECIFIED IRON DEFICIENCY ANEMIA TYPE: Primary | ICD-10-CM

## 2019-10-18 DIAGNOSIS — D50.9 IRON DEFICIENCY ANEMIA: ICD-10-CM

## 2019-10-18 LAB
GLUCOSE SERPL-MCNC: 103 MG/DL (ref 70–110)
POCT GLUCOSE: 103 MG/DL (ref 70–110)

## 2019-10-18 PROCEDURE — 88305 TISSUE EXAM BY PATHOLOGIST: CPT | Mod: 26,,, | Performed by: PATHOLOGY

## 2019-10-18 PROCEDURE — 82962 GLUCOSE BLOOD TEST: CPT | Performed by: INTERNAL MEDICINE

## 2019-10-18 PROCEDURE — 27201012 HC FORCEPS, HOT/COLD, DISP: Performed by: INTERNAL MEDICINE

## 2019-10-18 PROCEDURE — 45380 COLONOSCOPY AND BIOPSY: CPT | Mod: ,,, | Performed by: INTERNAL MEDICINE

## 2019-10-18 PROCEDURE — 27202087 HC PROBE, APC: Performed by: INTERNAL MEDICINE

## 2019-10-18 PROCEDURE — 88305 TISSUE EXAM BY PATHOLOGIST: CPT | Mod: 59 | Performed by: PATHOLOGY

## 2019-10-18 PROCEDURE — 37000009 HC ANESTHESIA EA ADD 15 MINS: Performed by: INTERNAL MEDICINE

## 2019-10-18 PROCEDURE — 43239 PR EGD, FLEX, W/BIOPSY, SGL/MULTI: ICD-10-PCS | Mod: 51,,, | Performed by: INTERNAL MEDICINE

## 2019-10-18 PROCEDURE — 00813 ANES UPR LWR GI NDSC PX: CPT | Performed by: INTERNAL MEDICINE

## 2019-10-18 PROCEDURE — 63600175 PHARM REV CODE 636 W HCPCS: Performed by: NURSE ANESTHETIST, CERTIFIED REGISTERED

## 2019-10-18 PROCEDURE — 88305 TISSUE SPECIMEN TO PATHOLOGY - SURGERY: ICD-10-PCS | Mod: 26,,, | Performed by: PATHOLOGY

## 2019-10-18 PROCEDURE — 43255 EGD CONTROL BLEEDING ANY: CPT

## 2019-10-18 PROCEDURE — 45380 PR COLONOSCOPY,BIOPSY: ICD-10-PCS | Mod: ,,, | Performed by: INTERNAL MEDICINE

## 2019-10-18 PROCEDURE — 43239 EGD BIOPSY SINGLE/MULTIPLE: CPT | Mod: 51,,, | Performed by: INTERNAL MEDICINE

## 2019-10-18 PROCEDURE — 37000008 HC ANESTHESIA 1ST 15 MINUTES: Performed by: INTERNAL MEDICINE

## 2019-10-18 PROCEDURE — 43239 EGD BIOPSY SINGLE/MULTIPLE: CPT | Performed by: INTERNAL MEDICINE

## 2019-10-18 PROCEDURE — 45380 COLONOSCOPY AND BIOPSY: CPT | Performed by: INTERNAL MEDICINE

## 2019-10-18 PROCEDURE — 63600175 PHARM REV CODE 636 W HCPCS: Performed by: INTERNAL MEDICINE

## 2019-10-18 RX ORDER — PROPOFOL 10 MG/ML
VIAL (ML) INTRAVENOUS CONTINUOUS PRN
Status: DISCONTINUED | OUTPATIENT
Start: 2019-10-18 | End: 2019-10-18

## 2019-10-18 RX ORDER — PANTOPRAZOLE SODIUM 40 MG/1
40 TABLET, DELAYED RELEASE ORAL DAILY
Qty: 30 TABLET | Refills: 11 | Status: SHIPPED | OUTPATIENT
Start: 2019-10-18 | End: 2020-10-22

## 2019-10-18 RX ORDER — PROPOFOL 10 MG/ML
VIAL (ML) INTRAVENOUS
Status: DISCONTINUED | OUTPATIENT
Start: 2019-10-18 | End: 2019-10-18

## 2019-10-18 RX ORDER — LIDOCAINE HCL/PF 100 MG/5ML
SYRINGE (ML) INTRAVENOUS
Status: DISCONTINUED | OUTPATIENT
Start: 2019-10-18 | End: 2019-10-18

## 2019-10-18 RX ORDER — ERGOCALCIFEROL 1.25 MG/1
50000 CAPSULE ORAL
COMMUNITY

## 2019-10-18 RX ORDER — SODIUM CHLORIDE 0.9 % (FLUSH) 0.9 %
10 SYRINGE (ML) INJECTION
Status: DISCONTINUED | OUTPATIENT
Start: 2019-10-18 | End: 2019-10-18 | Stop reason: HOSPADM

## 2019-10-18 RX ORDER — SODIUM CHLORIDE 9 MG/ML
INJECTION, SOLUTION INTRAVENOUS CONTINUOUS
Status: DISCONTINUED | OUTPATIENT
Start: 2019-10-18 | End: 2019-10-18 | Stop reason: HOSPADM

## 2019-10-18 RX ADMIN — PROPOFOL 80 MG: 10 INJECTION, EMULSION INTRAVENOUS at 01:10

## 2019-10-18 RX ADMIN — LIDOCAINE HYDROCHLORIDE 100 MG: 20 INJECTION, SOLUTION INTRAVENOUS at 01:10

## 2019-10-18 RX ADMIN — PROPOFOL 150 MCG/KG/MIN: 10 INJECTION, EMULSION INTRAVENOUS at 01:10

## 2019-10-18 RX ADMIN — SODIUM CHLORIDE: 0.9 INJECTION, SOLUTION INTRAVENOUS at 01:10

## 2019-10-18 NOTE — TRANSFER OF CARE
"Anesthesia Transfer of Care Note    Patient: Soila Hairston    Procedure(s) Performed: Procedure(s) (LRB):  EGD (ESOPHAGOGASTRODUODENOSCOPY) (N/A)  COLONOSCOPY2 day golytely (N/A)    Patient location: GI    Anesthesia Type: MAC    Transport from OR: Transported from OR on room air with adequate spontaneous ventilation    Post pain: adequate analgesia    Post assessment: no apparent anesthetic complications and tolerated procedure well    Post vital signs: stable    Level of consciousness: awake, alert and oriented    Nausea/Vomiting: no nausea/vomiting    Complications: none    Transfer of care protocol was followed      Last vitals:   Visit Vitals  /81 (BP Location: Left arm, Patient Position: Lying)   Pulse 86   Temp 36.9 °C (98.4 °F) (Temporal)   Resp 18   Ht 5' 4" (1.626 m)   Wt 83.9 kg (185 lb)   SpO2 97%   Breastfeeding? No   BMI 31.76 kg/m²     "

## 2019-10-18 NOTE — ANESTHESIA PREPROCEDURE EVALUATION
10/18/2019     Soila Hairston is a 64 y.o., female here for EGD/C-Scope    Past Medical History:   Diagnosis Date    Anticoagulant long-term use     Depression     Diabetes mellitus, type 2     Foot drop     a couple weeks ago    Hyperlipidemia     Hypertension     SOB (shortness of breath)     Stroke 12/23/2015     Past Surgical History:   Procedure Laterality Date    ABSCESS DRAINAGE Right     breast    EPIDURAL STEROID INJECTION      TRANSFORAMINAL EPIDURAL INJECTION OF STEROID Left 2/20/2019    Procedure: INJECTION, STEROID, EPIDURAL, TRANSFORAMINAL APPROACH [3999];  Surgeon: Ad Patel III, MD;  Location: [facility];  Service: Pain Management;  Laterality: Left;  L5/S1       Anesthesia Evaluation    I have reviewed the Patient Summary Reports.    I have reviewed the Nursing Notes.   I have reviewed the Medications.     Review of Systems  Anesthesia Hx:  History of prior surgery of interest to airway management or planning:   Hematology/Oncology:         -- Anemia:   Cardiovascular:   Exercise tolerance: good Hypertension    Neurological:   CVA Neuromuscular Disease,    Endocrine:   Diabetes    Psych:   depression          Physical Exam  General:  Well nourished    Airway/Jaw/Neck:  Airway Findings: Mouth Opening: Normal Tongue: Normal     Eyes/Ears/Nose:  EYES/EARS/NOSE FINDINGS: Normal    Chest/Lungs:  Chest/Lungs Clear    Heart/Vascular:  Heart Findings: Normal       Mental Status:  Mental Status Findings: Normal        Anesthesia Plan  Type of Anesthesia, risks & benefits discussed:  Anesthesia Type:  general  Patient's Preference:   Intra-op Monitoring Plan:   Intra-op Monitoring Plan Comments:   Post Op Pain Control Plan:   Post Op Pain Control Plan Comments:   Induction:    Beta Blocker:         Informed Consent: Patient understands risks and agrees with Anesthesia plan.   Questions answered. Anesthesia consent signed with patient.  ASA Score: 3     Day of Surgery Review of History & Physical:            Ready For Surgery From Anesthesia Perspective.

## 2019-10-18 NOTE — ANESTHESIA POSTPROCEDURE EVALUATION
Anesthesia Post Evaluation    Patient: Soila Hairston    Procedure(s) Performed: Procedure(s) (LRB):  EGD (ESOPHAGOGASTRODUODENOSCOPY) (N/A)  COLONOSCOPY2 day golytely (N/A)    Final Anesthesia Type: MAC  Patient location during evaluation: GI PACU  Patient participation: Yes- Able to Participate  Level of consciousness: awake and alert and oriented  Post-procedure vital signs: reviewed and stable  Pain management: adequate  Airway patency: patent  PONV status at discharge: No PONV  Anesthetic complications: no      Cardiovascular status: blood pressure returned to baseline, hemodynamically stable and stable  Respiratory status: unassisted, spontaneous ventilation and room air  Hydration status: euvolemic  Follow-up not needed.          Vitals Value Taken Time   /81 10/18/2019 12:59 PM   Temp 36.9 °C (98.4 °F) 10/18/2019 12:59 PM   Pulse 86 10/18/2019 12:59 PM   Resp 18 10/18/2019 12:59 PM   SpO2 97 % 10/18/2019 12:59 PM         No case tracking events are documented in the log.      Pain/Bernadine Score: No data recorded

## 2019-10-18 NOTE — INTERVAL H&P NOTE
The patient has been examined and the H&P has been reviewed:    I concur with the findings and no changes have occurred since H&P was written.    Anesthesia/Surgery risks, benefits and alternative options discussed and understood by patient/family.          Active Hospital Problems    Diagnosis  POA    Iron deficiency anemia [D50.9]  Yes      Resolved Hospital Problems   No resolved problems to display.

## 2019-10-18 NOTE — PLAN OF CARE
visited at bedside, discussed findings and recommendations with patient and family member; all questions asked and answered. Verbalized understanding of information given. Handout provided at time of discharge.

## 2019-10-18 NOTE — PROVATION PATIENT INSTRUCTIONS
Discharge Summary/Instructions after an Endoscopic Procedure  Patient Name: Soila Hairston  Patient MRN: [id number]  Patient YOB: 1955 Friday, October 18, 2019  Adryan Schultz MD  RESTRICTIONS:  During your procedure today, you received medications for sedation.  These   medications may affect your judgment, balance and coordination.  Therefore,   for 24 hours, you have the following restrictions:   - DO NOT drive a car, operate machinery, make legal/financial decisions,   sign important papers or drink alcohol.    ACTIVITY:  Today: no heavy lifting, straining or running due to procedural   sedation/anesthesia.  The following day: return to full activity including work.  DIET:  Eat and drink normally unless instructed otherwise.     TREATMENT FOR COMMON SIDE EFFECTS:  - Mild abdominal pain, nausea, belching, bloating or excessive gas:  rest,   eat lightly and use a heating pad.  - Sore Throat: treat with throat lozenges and/or gargle with warm salt   water.  - Because air was used during the procedure, expelling large amounts of air   from your rectum or belching is normal.  - If a bowel prep was taken, you may not have a bowel movement for 1-3 days.    This is normal.  SYMPTOMS TO WATCH FOR AND REPORT TO YOUR PHYSICIAN:  1. Abdominal pain or bloating, other than gas cramps.  2. Chest pain.  3. Back pain.  4. Signs of infection such as: chills or fever occurring within 24 hours   after the procedure.  5. Rectal bleeding, which would show as bright red, maroon, or black stools.   (A tablespoon of blood from the rectum is not serious, especially if   hemorrhoids are present.)  6. Vomiting.  7. Weakness or dizziness.  GO DIRECTLY TO THE NEAREST EMERGENCY ROOM IF YOU HAVE ANY OF THE FOLLOWING:      Difficulty breathing              Chills and/or fever over 101 F   Persistent vomiting and/or vomiting blood   Severe abdominal pain   Severe chest pain   Black, tarry stools   Bleeding- more than one  tablespoon   Any other symptom or condition that you feel may need urgent attention  Your doctor recommends these additional instructions:  If any biopsies were taken, your doctors clinic will contact you in 1 to 2   weeks with any results.  - Discharge patient to home.   - Patient has a contact number available for emergencies.  The signs and   symptoms of potential delayed complications were discussed with the   patient.  Return to normal activities tomorrow.  Written discharge   instructions were provided to the patient.   - Resume previous diet.   - Continue present medications.   - Await pathology results.   - Use a proton pump inhibitor PO daily given need for anticoag.   - Perform a colonoscopy today.  For questions, problems or results please call your physician - Adryan Schultz MD at Work:  [phone].  EMERGENCY PHONE NUMBER: [phone],  LAB RESULTS: [phone]  IF A COMPLICATION OR EMERGENCY SITUATION ARISES AND YOU ARE UNABLE TO REACH   YOUR PHYSICIAN - GO DIRECTLY TO THE EMERGENCY ROOM.  Adryan Schultz MD  10/18/2019 1:50:41 PM  This report has been verified and signed electronically.  PROVATION

## 2019-10-18 NOTE — PROVATION PATIENT INSTRUCTIONS
Discharge Summary/Instructions after an Endoscopic Procedure  Patient Name: Soila Hairston  Patient MRN: [id number]  Patient YOB: 1955 Friday, October 18, 2019  Adryan Schultz MD  RESTRICTIONS:  During your procedure today, you received medications for sedation.  These   medications may affect your judgment, balance and coordination.  Therefore,   for 24 hours, you have the following restrictions:   - DO NOT drive a car, operate machinery, make legal/financial decisions,   sign important papers or drink alcohol.    ACTIVITY:  Today: no heavy lifting, straining or running due to procedural   sedation/anesthesia.  The following day: return to full activity including work.  DIET:  Eat and drink normally unless instructed otherwise.     TREATMENT FOR COMMON SIDE EFFECTS:  - Mild abdominal pain, nausea, belching, bloating or excessive gas:  rest,   eat lightly and use a heating pad.  - Sore Throat: treat with throat lozenges and/or gargle with warm salt   water.  - Because air was used during the procedure, expelling large amounts of air   from your rectum or belching is normal.  - If a bowel prep was taken, you may not have a bowel movement for 1-3 days.    This is normal.  SYMPTOMS TO WATCH FOR AND REPORT TO YOUR PHYSICIAN:  1. Abdominal pain or bloating, other than gas cramps.  2. Chest pain.  3. Back pain.  4. Signs of infection such as: chills or fever occurring within 24 hours   after the procedure.  5. Rectal bleeding, which would show as bright red, maroon, or black stools.   (A tablespoon of blood from the rectum is not serious, especially if   hemorrhoids are present.)  6. Vomiting.  7. Weakness or dizziness.  GO DIRECTLY TO THE NEAREST EMERGENCY ROOM IF YOU HAVE ANY OF THE FOLLOWING:      Difficulty breathing              Chills and/or fever over 101 F   Persistent vomiting and/or vomiting blood   Severe abdominal pain   Severe chest pain   Black, tarry stools   Bleeding- more than one  tablespoon   Any other symptom or condition that you feel may need urgent attention  Your doctor recommends these additional instructions:  If any biopsies were taken, your doctors clinic will contact you in 1 to 2   weeks with any results.  - Discharge patient to home.   - Patient has a contact number available for emergencies.  The signs and   symptoms of potential delayed complications were discussed with the   patient.  Return to normal activities tomorrow.  Written discharge   instructions were provided to the patient.   - Resume previous diet.   - Continue present medications.   - Await pathology results.   - Repeat colonoscopy date to be determined after pending pathology results   are reviewed.   - Pathology results will determine repeat colonoscopy vs. possible surgical   referral.  For questions, problems or results please call your physician - Adryan Schultz MD at Work:  [phone].  EMERGENCY PHONE NUMBER: [phone],  LAB RESULTS: [phone]  IF A COMPLICATION OR EMERGENCY SITUATION ARISES AND YOU ARE UNABLE TO REACH   YOUR PHYSICIAN - GO DIRECTLY TO THE EMERGENCY ROOM.  Adryan Schultz MD  10/18/2019 2:35:33 PM  This report has been verified and signed electronically.  [employer]

## 2019-10-21 VITALS
HEART RATE: 82 BPM | RESPIRATION RATE: 20 BRPM | BODY MASS INDEX: 31.58 KG/M2 | OXYGEN SATURATION: 100 % | TEMPERATURE: 97 F | HEIGHT: 64 IN | DIASTOLIC BLOOD PRESSURE: 92 MMHG | WEIGHT: 185 LBS | SYSTOLIC BLOOD PRESSURE: 157 MMHG

## 2019-10-25 ENCOUNTER — TELEPHONE (OUTPATIENT)
Dept: GASTROENTEROLOGY | Facility: CLINIC | Age: 64
End: 2019-10-25

## 2019-10-25 DIAGNOSIS — B96.81 HELICOBACTER PYLORI GASTRITIS: ICD-10-CM

## 2019-10-25 DIAGNOSIS — K63.9 LESION OF CECUM: Primary | ICD-10-CM

## 2019-10-25 DIAGNOSIS — K29.70 HELICOBACTER PYLORI GASTRITIS: ICD-10-CM

## 2019-10-25 RX ORDER — METRONIDAZOLE 250 MG/1
250 TABLET ORAL 4 TIMES DAILY
Qty: 56 TABLET | Refills: 0 | Status: SHIPPED | OUTPATIENT
Start: 2019-10-25 | End: 2019-11-08

## 2019-10-25 RX ORDER — DOXYCYCLINE 100 MG/1
100 CAPSULE ORAL EVERY 12 HOURS
Qty: 28 CAPSULE | Refills: 0 | Status: SHIPPED | OUTPATIENT
Start: 2019-10-25 | End: 2019-11-08

## 2019-10-25 NOTE — TELEPHONE ENCOUNTER
----- Message from Adryan Schultz MD sent at 10/25/2019 12:36 PM CDT -----  Called and discussed results. Biopsies with nonspecific colitis, no dysplasia. Possibly lipoma.  Discussed with Dr. Welch and we will proceed with single balloon colonoscopy to further evaluate that area and TI intubation to rule out other inflammation. Will order and be scheduled in approx 3 months. Message sent to [facility] main schedulers.     Discussed + HP and needs treatment. Orders sent to [facility].  Treat for H pylori x 14 days  Bismuth subsalicylate 525 mg PO Q6H for 14 days  Flagyl 250 mg PO Q6H 14 days  Doxycycline 100mg BID 14 days  protonix 40mg BID x 14 days (needs to increase from daily to twice a day then resume daily after the 14 days)    Jeanette, please call bianka (naomy) [phone] to explain h pylori treatment (pt states she herself doesn't handle medications.  Can we make her a recall appt in approx 4 months or so.  Also we need EGD recall in 3 years given metaplasia on EGD.    =======  FINAL PATHOLOGIC DIAGNOSIS  1. 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  Helicobacter pylori microorganisms are identified on H&E stain.  Extensive intestinal metaplasia is present.  No dysplasia or malignancy.  2. COLON, ILEOCECAL MASS, BIOPSY:  Colonic mucosa with active cryptitis, see note.  No granulomas, dysplasia, or malignancy.  NOTE: Additional deeper sections of specimen 2 of been examined and no dysplasia or malignancy is identified.  Occasional prominent lymphoid aggregates are noted, which are within normal limits for this part of the colon.  Additionally, there is some focally prominent submucosal fat which may represent a possible submucosal lipoma in  this area. Focal active colitis is and etiologically nonspecific pattern which is also noted in this area which is likely  secondary to mechanical disturbances in the setting of prominent mucosa  with possible prominent submucosal fat  and or lymphoid aggregates. The differential diagnosis includes inflammatory bowel disease , certain drugs,  infectious colitis, and diverticular disease associated colitis. No granulomas or features of chronicity are identified.  Correlation with clinical findings is recommended. This lesion was noted to be partially circumferential; however  definitive features of a mass are not noted in the sample. Repeat sampling may be indicated if clinical concern for  malignancy is high.

## 2019-10-29 ENCOUNTER — TELEPHONE (OUTPATIENT)
Dept: ENDOSCOPY | Facility: HOSPITAL | Age: 64
End: 2019-10-29

## 2019-10-29 NOTE — TELEPHONE ENCOUNTER
----- Message from Mer Mac MA sent at 10/29/2019 12:43 PM CDT -----  Contact: Daughter is returning call - [phone]      ----- Message -----  From: Safia Meza  Sent: 10/29/2019  12:17 PM CDT  To: Yuri Webb Staff    Daughter is returning call. Please return call.

## 2019-10-29 NOTE — TELEPHONE ENCOUNTER
Spoke with patient's daughter. Balloon colonoscopy scheduled for 1/13/20 at 9:30a. Endo scheduling nurse will contact patient with prep.

## 2019-12-17 ENCOUNTER — TELEPHONE (OUTPATIENT)
Dept: ENDOSCOPY | Facility: HOSPITAL | Age: 64
End: 2019-12-17

## 2019-12-17 NOTE — TELEPHONE ENCOUNTER
Placed call to daughter Nisreen to review meds and medical hx. Voicemail with call back number provided.

## 2019-12-18 ENCOUNTER — TELEPHONE (OUTPATIENT)
Dept: ENDOSCOPY | Facility: HOSPITAL | Age: 64
End: 2019-12-18

## 2019-12-18 NOTE — TELEPHONE ENCOUNTER
Pt is scheduled for device assisted colonoscopy on 1/13/19. Pt currently taking plavix. Per endoscopy protocol need to hold x 5 days prior to procedure. Ok to hold? Please advise.

## 2019-12-27 ENCOUNTER — TELEPHONE (OUTPATIENT)
Dept: ENDOSCOPY | Facility: HOSPITAL | Age: 64
End: 2019-12-27

## 2019-12-27 DIAGNOSIS — Z12.11 SPECIAL SCREENING FOR MALIGNANT NEOPLASMS, COLON: Primary | ICD-10-CM

## 2019-12-27 RX ORDER — POLYETHYLENE GLYCOL 3350, SODIUM SULFATE ANHYDROUS, SODIUM BICARBONATE, SODIUM CHLORIDE, POTASSIUM CHLORIDE 236; 22.74; 6.74; 5.86; 2.97 G/4L; G/4L; G/4L; G/4L; G/4L
POWDER, FOR SOLUTION ORAL
Qty: 2 BOTTLE | Refills: 0 | Status: ON HOLD | OUTPATIENT
Start: 2019-12-27 | End: 2020-01-13 | Stop reason: HOSPADM

## 2019-12-27 NOTE — TELEPHONE ENCOUNTER
Received request to schedule patient for Balloon Colonoscopy on 1/13/20 at 9:30am. Spoke with Patient's daughter. Reviewed medical history and medications. Instructed on procedure and prep. Patient's daughter verbalized understanding of instructions. Mailed copy of instructions to address in chart.

## 2020-01-07 ENCOUNTER — TELEPHONE (OUTPATIENT)
Dept: ENDOSCOPY | Facility: HOSPITAL | Age: 65
End: 2020-01-07

## 2020-01-11 ENCOUNTER — NURSE TRIAGE (OUTPATIENT)
Dept: ADMINISTRATIVE | Facility: CLINIC | Age: 65
End: 2020-01-11

## 2020-01-11 NOTE — TELEPHONE ENCOUNTER
"Daughter states she was told to give the patient her 2 dulcolax pills at 12noon but she forgot and just gave them to her at 4:30pm. She states she will give her the 1/2 of her golytely at 6pm as scheduled. Daughter advised that objective is to make sure that she is clear by tomorrow. Understanding verbalized. No other needs voiced. Please contact caller directly to discuss any further care advice.    Reason for Disposition   Caller has NON-URGENT medication question about med that PCP prescribed and triager unable to answer question    Additional Information   Negative: Drug overdose and nurse unable to answer question   Negative: Caller requesting information not related to medicine   Negative: Caller requesting a prescription for Strep throat and has a positive culture result   Negative: Rash while taking a medication or within 3 days of stopping it   Negative: Immunization reaction suspected   Negative: [1] Asthma AND [2] having symptoms of asthma (cough, wheezing, etc)   Negative: MORE THAN A DOUBLE DOSE of a prescription or over-the-counter (OTC) drug   Negative: [1] DOUBLE DOSE (an extra dose or lesser amount) of over-the-counter (OTC) drug AND [2] any symptoms (e.g., dizziness, nausea, pain, sleepiness)   Negative: [1] DOUBLE DOSE (an extra dose or lesser amount) of prescription drug AND [2] any symptoms (e.g., dizziness, nausea, pain, sleepiness)   Negative: Took another person's prescription drug   Negative: [1] DOUBLE DOSE (an extra dose or lesser amount) of prescription drug AND [2] NO symptoms (Exception: a double dose of antibiotics)   Negative: Diabetes drug error or overdose (e.g., insulin or extra dose)   Negative: [1] Request for URGENT new prescription or refill of "essential" medication (i.e., likelihood of harm to patient if not taken) AND [2] triager unable to fill per unit policy   Negative: [1] Prescription not at pharmacy AND [2] was prescribed today by PCP   Negative: " Pharmacy calling with prescription questions and triager unable to answer question   Negative: Caller has urgent medication question about med that PCP prescribed and triager unable to answer question    Protocols used: MEDICATION QUESTION CALL-A-AH

## 2020-01-12 NOTE — TELEPHONE ENCOUNTER
Reason for Disposition   Caller has urgent medication question about med that PCP prescribed and triager unable to answer question    Protocols used: MEDICATION QUESTION CALL-A-AH    Patient's daughter had a question about colonscopy.pt takes iron supplement which turns her stools dark and sometimes black. Informed her daughter that I didn't see anything in the instructions that indicate she could not take it, but I would reach out to on call.     Called Dr. Arana. He states the iron supplement is fine.     [facility] the iron supplement does not have to be discontinued. Nisreen verbalized understanding.

## 2020-01-12 NOTE — TELEPHONE ENCOUNTER
Reason for Disposition   Health Information question, no triage required and triager able to answer question    Protocols used: INFORMATION ONLY CALL-A-AH    Wanting to go over what clear liquids she can have prior to c-scope. Reviewed with daughter.

## 2020-01-13 ENCOUNTER — ANESTHESIA (OUTPATIENT)
Dept: ENDOSCOPY | Facility: HOSPITAL | Age: 65
End: 2020-01-13
Payer: MEDICARE

## 2020-01-13 ENCOUNTER — HOSPITAL ENCOUNTER (OUTPATIENT)
Facility: HOSPITAL | Age: 65
Discharge: HOME | End: 2020-01-13
Attending: INTERNAL MEDICINE | Admitting: INTERNAL MEDICINE
Payer: MEDICARE

## 2020-01-13 ENCOUNTER — ANESTHESIA EVENT (OUTPATIENT)
Dept: ENDOSCOPY | Facility: HOSPITAL | Age: 65
End: 2020-01-13
Payer: MEDICARE

## 2020-01-13 VITALS
RESPIRATION RATE: 18 BRPM | DIASTOLIC BLOOD PRESSURE: 90 MMHG | SYSTOLIC BLOOD PRESSURE: 132 MMHG | OXYGEN SATURATION: 97 % | HEART RATE: 90 BPM | BODY MASS INDEX: 33.49 KG/M2 | TEMPERATURE: 97 F | WEIGHT: 182 LBS | HEIGHT: 62 IN

## 2020-01-13 DIAGNOSIS — K52.9 COLITIS: Primary | ICD-10-CM

## 2020-01-13 LAB
POCT GLUCOSE: 67 MG/DL (ref 70–110)
POCT GLUCOSE: 74 MG/DL (ref 70–110)

## 2020-01-13 PROCEDURE — 00811 ANES LWR INTST NDSC NOS: CPT | Performed by: INTERNAL MEDICINE

## 2020-01-13 PROCEDURE — 27201089 HC SNARE, DISP (ANY): Performed by: INTERNAL MEDICINE

## 2020-01-13 PROCEDURE — 63600175 PHARM REV CODE 636 W HCPCS: Performed by: NURSE ANESTHETIST, CERTIFIED REGISTERED

## 2020-01-13 PROCEDURE — 88305 TISSUE EXAM BY PATHOLOGIST: CPT | Mod: 26,,, | Performed by: PATHOLOGY

## 2020-01-13 PROCEDURE — 63600175 PHARM REV CODE 636 W HCPCS: Performed by: INTERNAL MEDICINE

## 2020-01-13 PROCEDURE — 82962 GLUCOSE BLOOD TEST: CPT | Performed by: INTERNAL MEDICINE

## 2020-01-13 PROCEDURE — 88305 TISSUE EXAM BY PATHOLOGIST: CPT | Performed by: PATHOLOGY

## 2020-01-13 PROCEDURE — 27201012 HC FORCEPS, HOT/COLD, DISP: Performed by: INTERNAL MEDICINE

## 2020-01-13 PROCEDURE — D9220A PRA ANESTHESIA: Mod: CRNA,,, | Performed by: NURSE ANESTHETIST, CERTIFIED REGISTERED

## 2020-01-13 PROCEDURE — 45385 PR COLONOSCOPY,REMV LESN,SNARE: ICD-10-PCS | Mod: ,,, | Performed by: INTERNAL MEDICINE

## 2020-01-13 PROCEDURE — D9220A PRA ANESTHESIA: ICD-10-PCS | Mod: CRNA,,, | Performed by: NURSE ANESTHETIST, CERTIFIED REGISTERED

## 2020-01-13 PROCEDURE — D9220A PRA ANESTHESIA: ICD-10-PCS | Mod: ANES,,, | Performed by: ANESTHESIOLOGY

## 2020-01-13 PROCEDURE — D9220A PRA ANESTHESIA: Mod: ANES,,, | Performed by: ANESTHESIOLOGY

## 2020-01-13 PROCEDURE — 45385 COLONOSCOPY W/LESION REMOVAL: CPT | Mod: ,,, | Performed by: INTERNAL MEDICINE

## 2020-01-13 PROCEDURE — 45380 PR COLONOSCOPY,BIOPSY: ICD-10-PCS | Mod: 59,,, | Performed by: INTERNAL MEDICINE

## 2020-01-13 PROCEDURE — 45385 COLONOSCOPY W/LESION REMOVAL: CPT | Performed by: INTERNAL MEDICINE

## 2020-01-13 PROCEDURE — 88305 TISSUE EXAM BY PATHOLOGIST: ICD-10-PCS | Mod: 26,,, | Performed by: PATHOLOGY

## 2020-01-13 PROCEDURE — 37000009 HC ANESTHESIA EA ADD 15 MINS: Performed by: INTERNAL MEDICINE

## 2020-01-13 PROCEDURE — 45380 COLONOSCOPY AND BIOPSY: CPT | Mod: 59,,, | Performed by: INTERNAL MEDICINE

## 2020-01-13 PROCEDURE — 45380 COLONOSCOPY AND BIOPSY: CPT | Performed by: INTERNAL MEDICINE

## 2020-01-13 PROCEDURE — 37000008 HC ANESTHESIA 1ST 15 MINUTES: Performed by: INTERNAL MEDICINE

## 2020-01-13 RX ORDER — SODIUM CHLORIDE 0.9 % (FLUSH) 0.9 %
10 SYRINGE (ML) INJECTION
Status: DISCONTINUED | OUTPATIENT
Start: 2020-01-13 | End: 2020-01-13 | Stop reason: HOSPADM

## 2020-01-13 RX ORDER — LIDOCAINE HCL/PF 100 MG/5ML
SYRINGE (ML) INTRAVENOUS
Status: DISCONTINUED | OUTPATIENT
Start: 2020-01-13 | End: 2020-01-13

## 2020-01-13 RX ORDER — SODIUM CHLORIDE 9 MG/ML
INJECTION, SOLUTION INTRAVENOUS CONTINUOUS
Status: DISCONTINUED | OUTPATIENT
Start: 2020-01-13 | End: 2020-01-13 | Stop reason: HOSPADM

## 2020-01-13 RX ORDER — PROPOFOL 10 MG/ML
VIAL (ML) INTRAVENOUS
Status: DISCONTINUED | OUTPATIENT
Start: 2020-01-13 | End: 2020-01-13

## 2020-01-13 RX ORDER — PROPOFOL 10 MG/ML
VIAL (ML) INTRAVENOUS CONTINUOUS PRN
Status: DISCONTINUED | OUTPATIENT
Start: 2020-01-13 | End: 2020-01-13

## 2020-01-13 RX ADMIN — PROPOFOL 30 MG: 10 INJECTION, EMULSION INTRAVENOUS at 10:01

## 2020-01-13 RX ADMIN — LIDOCAINE HYDROCHLORIDE 50 MG: 20 INJECTION, SOLUTION INTRAVENOUS at 10:01

## 2020-01-13 RX ADMIN — PROPOFOL 50 MCG/KG/MIN: 10 INJECTION, EMULSION INTRAVENOUS at 09:01

## 2020-01-13 RX ADMIN — SODIUM CHLORIDE: 0.9 INJECTION, SOLUTION INTRAVENOUS at 09:01

## 2020-01-13 NOTE — DISCHARGE INSTRUCTIONS
Colonoscopy     A camera attached to a flexible tube with a viewing lens is used to take video pictures.     Colonoscopy is a test to view the inside of your lower digestive tract (colon and rectum). Sometimes it can show the last part of the small intestine (ileum). During the test, small pieces of tissue may be removed for testing. This is called a biopsy. Small growths, such as polyps, may also be removed.   Why is colonoscopy done?  The test is done to help look for colon cancer. And it can help find the source of abdominal pain, bleeding, and changes in bowel habits. It may be needed once a year, depending on factors such as your:  · Age  · Health history  · Family health history  · Symptoms  · Results from any prior colonoscopy  Risks and possible complications  These include:  · Bleeding               · A puncture or tear in the colon   · Risks of anesthesia  · A cancer lesion not being seen  Getting ready   To prepare for the test:  · Talk with your healthcare provider about the risks of the test (see below). Also ask your healthcare provider about alternatives to the test.  · Tell your healthcare provider about any medicines you take. Also tell him or her about any health conditions you may have.  · Make sure your rectum and colon are empty for the test. Follow the diet and bowel prep instructions exactly. If you dont, the test may need to be rescheduled.  · Plan for a friend or family member to drive you home after the test.     Colonoscopy provides an inside view of the entire colon.     You may discuss the results with your doctor right away or at a future visit.  During the test   The test is usually done in the hospital on an outpatient basis. This means you go home the same day. The procedure takes about 30 minutes. During that time:  · You are given relaxing (sedating) medicine through an IV line. You may be drowsy, or fully asleep.  · The healthcare provider will first give you a physical exam to  check for anal and rectal problems.  · Then the anus is lubricated and the scope inserted.  · If you are awake, you may have a feeling similar to needing to have a bowel movement. You may also feel pressure as air is pumped into the colon. Its OK to pass gas during the procedure.  · Biopsy, polyp removal, or other treatments may be done during the test.  After the test   You may have gas right after the test. It can help to try to pass it to help prevent later bloating. Your healthcare provider may discuss the results with you right away. Or you may need to schedule a follow-up visit to talk about the results. After the test, you can go back to your normal eating and other activities. You may be tired from the sedation and need to rest for a few hours.  Date Last Reviewed: 11/1/2016 © 2000-2017 [employer]. [address] [place] [address], [place] [address]. All rights reserved. This information is not intended as a substitute for professional medical care. Always follow your healthcare professional's instructions.

## 2020-01-13 NOTE — PROVATION PATIENT INSTRUCTIONS
Discharge Summary/Instructions after an Endoscopic Procedure  Patient Name: Soila Hairston  Patient MRN: [id number]  Patient YOB: 1955 Monday, January 13, 2020  Jon Welch MD  RESTRICTIONS:  During your procedure today, you received medications for sedation.  These   medications may affect your judgment, balance and coordination.  Therefore,   for 24 hours, you have the following restrictions:   - DO NOT drive a car, operate machinery, make legal/financial decisions,   sign important papers or drink alcohol.    ACTIVITY:  Today: no heavy lifting, straining or running due to procedural   sedation/anesthesia.  The following day: return to full activity including work.  DIET:  Eat and drink normally unless instructed otherwise.     TREATMENT FOR COMMON SIDE EFFECTS:  - Mild abdominal pain, nausea, belching, bloating or excessive gas:  rest,   eat lightly and use a heating pad.  - Sore Throat: treat with throat lozenges and/or gargle with warm salt   water.  - Because air was used during the procedure, expelling large amounts of air   from your rectum or belching is normal.  - If a bowel prep was taken, you may not have a bowel movement for 1-3 days.    This is normal.  SYMPTOMS TO WATCH FOR AND REPORT TO YOUR PHYSICIAN:  1. Abdominal pain or bloating, other than gas cramps.  2. Chest pain.  3. Back pain.  4. Signs of infection such as: chills or fever occurring within 24 hours   after the procedure.  5. Rectal bleeding, which would show as bright red, maroon, or black stools.   (A tablespoon of blood from the rectum is not serious, especially if   hemorrhoids are present.)  6. Vomiting.  7. Weakness or dizziness.  GO DIRECTLY TO THE NEAREST EMERGENCY ROOM IF YOU HAVE ANY OF THE FOLLOWING:      Difficulty breathing              Chills and/or fever over 101 F   Persistent vomiting and/or vomiting blood   Severe abdominal pain   Severe chest pain   Black, tarry stools   Bleeding- more than one  tablespoon   Any other symptom or condition that you feel may need urgent attention  Your doctor recommends these additional instructions:  If any biopsies were taken, your doctors clinic will contact you in 1 to 2   weeks with any results.  - Discharge patient to home.   - Patient has a contact number available for emergencies.  The signs and   symptoms of potential delayed complications were discussed with the   patient.  Return to normal activities tomorrow.  Written discharge   instructions were provided to the patient.   - Resume previous diet.   - Continue present medications.   - Await pathology results.   - Telephone GI clinic for pathology results in 2 weeks.   - Resume Plavix (clopidogrel) at prior dose in 2 days.   - Repeat colonoscopy in 5 years for surveillance.   For questions, problems or results please call your physician - Jon Welch MD at Work:  [phone].  [facility], EMERGENCY ROOM PHONE NUMBER: [phone]  IF A COMPLICATION OR EMERGENCY SITUATION ARISES AND YOU ARE UNABLE TO REACH   YOUR PHYSICIAN - GO DIRECTLY TO THE EMERGENCY ROOM.  Jon Welch MD  1/13/2020 10:57:28 AM  This report has been verified and signed electronically.  [employer]OVATION

## 2020-01-13 NOTE — H&P
Short Stay Endoscopy History and Physical      Procedure - Colonoscopy  ASA - per anesthesia  Mallampati - per anesthesia  History of Anesthesia problems - no  Family history Anesthesia problems - no   Plan of anesthesia - MAC    HPI:  This is a 64 y.o. female here for follow-up of a colon mass with colitis.  Unclear etiology.  Significant looping during last colonoscopy.       ROS:  Constitutional: No fevers, chills, No weight loss  CV: No chest pain  Pulm: No cough, No shortness of breath  GI: see HPI    Medical History:  has a past medical history of Anticoagulant long-term use, Depression, Diabetes mellitus, type 2, Foot drop, Hyperlipidemia, Hypertension, SOB (shortness of breath), and Stroke (12/23/2015).    Surgical History:  has a past surgical history that includes Epidural steroid injection; Abscess drainage (Right); Transforaminal epidural injection of steroid (Left, 2/20/2019); Esophagogastroduodenoscopy (N/A, 10/18/2019); and Colonoscopy (N/A, 10/18/2019).    Family History: family history includes Breast cancer in her mother; Dementia in her mother.    Social History:  reports that she has been smoking cigarettes. She has a 10.00 pack-year smoking history. She has never used smokeless tobacco. She reports that she does not drink alcohol or use drugs.    Review of patient's allergies indicates:  No Known Allergies    Medications:   Medications Prior to Admission   Medication Sig Dispense Refill Last Dose    alprazolam (XANAX) 2 MG Tab Take 1 tablet (2 mg total) by mouth 2 (two) times daily as needed. Take 1/2 tablet by mouth two times daily as needed. 28 tablet 0 1/12/2020 at Unknown time    hydrochlorothiazide (HYDRODIURIL) 25 MG tablet Take 1 tablet (25 mg total) by mouth once daily. 30 tablet 3 1/13/2020 at Unknown time    LANTUS SOLOSTAR 100 unit/mL (3 mL) InPn pen Inject 40 Units into the skin once daily. 1 Box 3 1/12/2020 at Unknown time    losartan (COZAAR) 50 MG tablet Take 1 tablet (50  "mg total) by mouth once daily. 30 tablet 3 1/13/2020 at Unknown time    metoprolol tartrate (LOPRESSOR) 50 MG tablet Take 50 mg by mouth once daily.   1/13/2020 at Unknown time    oxybutynin (DITROPAN) 5 MG Tab Take 1 tablet (5 mg total) by mouth 3 (three) times daily. 90 tablet 3 1/12/2020 at Unknown time    pantoprazole (PROTONIX) 40 MG tablet Take 1 tablet (40 mg total) by mouth once daily. 30 tablet 11 1/12/2020 at Unknown time    polyethylene glycol (GOLYTELY,NULYTELY) 236-22.74-6.74 -5.86 gram suspension Take 4,000 mLs (4 L total) by mouth As instructed. 4000 mL 0 1/13/2020 at Unknown time    simvastatin (ZOCOR) 40 MG tablet Take 1 tablet (40 mg total) by mouth once daily. 30 tablet 3 1/12/2020 at Unknown time    baclofen (LIORESAL) 10 MG tablet Take 1 tablet (10 mg total) by mouth 3 (three) times daily. (Patient taking differently: Take 10 mg by mouth once daily. ) 90 tablet 3 10/17/2019 at Unknown time    BD INSULIN PEN NEEDLE UF MINI 31 gauge x 3/16" Ndle    Taking    citalopram (CELEXA) 20 MG tablet Take 1 tablet (20 mg total) by mouth once daily. (Patient not taking: Reported on 12/18/2019) 30 tablet 3 Not Taking    clopidogrel (PLAVIX) 75 mg tablet Take 1 tablet (75 mg total) by mouth once daily. 30 tablet 11 10/13/2019    ergocalciferol (VITAMIN D2) 50,000 unit Cap Take 50,000 Units by mouth every 7 days.   1/11/2020    ferrous gluconate (FERGON) 324 MG tablet Take 324 mg by mouth daily with breakfast.   1/11/2020    fluticasone (FLONASE) 50 mcg/actuation nasal spray USE 2 SPRAYS IN EACH NOSTRIL EVERY DAY (Patient not taking: Reported on 12/18/2019) 48 g 3 Not Taking    furosemide (LASIX) 40 MG tablet Take 40 mg by mouth 2 (two) times daily.   More than a month at Unknown time    gabapentin (NEURONTIN) 300 MG capsule Take 2 tablets at night for 7 days, then increase to 3 tablets at night. (Patient not taking: Reported on 12/18/2019) 90 capsule 11 More than a month at Unknown time    " glimepiride (AMARYL) 2 MG tablet glimepiride 2 mg tablet bid   1/11/2020    levocetirizine (XYZAL) 5 MG tablet Take 5 mg by mouth every evening.   10/17/2019 at Unknown time    NON FORMULARY MEDICATION Diclofenac/lido/prilocaine 0.15/2.25/2.25% apply 3.2 grams to painful area up to five times a day   Taking    oxyCODONE-acetaminophen (PERCOCET)  mg per tablet Take 1 tablet by mouth every 4 (four) hours as needed for Pain. 18 tablet 0 10/4/2019    polyethylene glycol (GOLYTELY,NULYTELY) 236-22.74-6.74 -5.86 gram suspension Pt is to do 2 days of prep-needs 2 bottles of peg prep 2 Bottle 0     TRUETEST TEST STRIPS Strp    Taking    umeclidinium brm/vilanterol tr (ANORO ELLIPTA INHL) Inhale into the lungs once daily.   10/18/2019 at Unknown time         Physical Exam:    Vital Signs:   Vitals:    01/13/20 0916   BP: 129/76   Pulse: 87   Resp: 14   Temp: 98.2 °F (36.8 °C)       General Appearance: Well appearing in no acute distress  Eyes:    No scleral icterus  ENT: Neck supple, Lips, mucosa, and tongue normal; teeth and gums normal  Lungs: CTA bilaterally  Heart:  S1, S2 normal, no murmurs heard  Abdomen: Soft, non tender, non distended with positive bowel sounds. No hepatosplenomegaly, ascites, or mass.      Labs:  Lab Results   Component Value Date    CREATININE 1.0 03/04/2016         Assessment:  64 y.o. female with colon lesion and colitis.    Plan:  Proceed with colonoscopy today, device-assisted as needed.  I have explained the risks and benefits of endoscopy procedures to the patient including but not limited to bleeding, perforation, infection, and death.  All questions and answered.        Jon Welch MD

## 2020-01-13 NOTE — ANESTHESIA POSTPROCEDURE EVALUATION
Anesthesia Post Evaluation    Patient: Soila Hairston    Procedure(s) Performed: Procedure(s) (LRB):  COLONOSCOPY, WITH RETROGRADE BALLOON ENTEROSCOPY, SINGLE OR DOUBLE BALLOON (N/A)    Final Anesthesia Type: general    Patient location during evaluation: PACU  Patient participation: Yes- Able to Participate  Level of consciousness: awake and alert, awake and oriented  Post-procedure vital signs: reviewed and stable  Pain management: adequate  Airway patency: patent    PONV status at discharge: No PONV  Anesthetic complications: no      Cardiovascular status: blood pressure returned to baseline, stable and hemodynamically stable  Respiratory status: unassisted, spontaneous ventilation and room air  Hydration status: euvolemic  Follow-up not needed.          Vitals Value Taken Time   /90 1/13/2020 11:32 AM   Temp 36.2 °C (97.2 °F) 1/13/2020 10:48 AM   Pulse 91 1/13/2020 11:42 AM   Resp 18 1/13/2020 11:30 AM   SpO2 99 % 1/13/2020 11:42 AM   Vitals shown include unvalidated device data.      No case tracking events are documented in the log.      Pain/Bernadine Score: Bernadine Score: 10 (1/13/2020 11:10 AM)

## 2020-01-13 NOTE — ANESTHESIA PREPROCEDURE EVALUATION
01/13/2020  Soila Hairston is a 64 y.o., female.    Anesthesia Evaluation    I have reviewed the Patient Summary Reports.    I have reviewed the Nursing Notes.   I have reviewed the Medications.     Review of Systems  Anesthesia Hx:  History of prior surgery of interest to airway management or planning:   Social:  Non-Smoker, No Alcohol Use    Hematology/Oncology:         -- Anemia:   Cardiovascular:   Exercise tolerance: good Hypertension    Pulmonary:   Shortness of breath    Musculoskeletal:   Arthritis     Neurological:   CVA Neuromuscular Disease,    Endocrine:   Diabetes    Psych:   Psychiatric History depression          Physical Exam  General:  Well nourished    Airway/Jaw/Neck:  Airway Findings: Mouth Opening: Normal Tongue: Normal  General Airway Assessment: Adult     Eyes/Ears/Nose:  EYES/EARS/NOSE FINDINGS: Normal    Chest/Lungs:  Chest/Lungs Findings: Clear to auscultation, Normal Respiratory Rate     Heart/Vascular:  Heart Findings: Rate: Normal  Rhythm: Regular Rhythm  Sounds: Normal        Mental Status:  Mental Status Findings: Normal        Anesthesia Plan  Type of Anesthesia, risks & benefits discussed:  Anesthesia Type:  general  Patient's Preference:   Intra-op Monitoring Plan: standard ASA monitors  Intra-op Monitoring Plan Comments:   Post Op Pain Control Plan: multimodal analgesia and per primary service following discharge from PACU  Post Op Pain Control Plan Comments:   Induction:   IV  Beta Blocker:  Patient is not currently on a Beta-Blocker (No further documentation required).       Informed Consent: Patient understands risks and agrees with Anesthesia plan.  Questions answered. Anesthesia consent signed with patient.  ASA Score: 2     Day of Surgery Review of History & Physical:    H&P update referred to the provider.         Ready For Surgery From Anesthesia Perspective.

## 2020-01-13 NOTE — TRANSFER OF CARE
"Anesthesia Transfer of Care Note    Patient: Soila Hairston    Procedure(s) Performed: Procedure(s) (LRB):  COLONOSCOPY, WITH RETROGRADE BALLOON ENTEROSCOPY, SINGLE OR DOUBLE BALLOON (N/A)    Patient location: [facility]    Anesthesia Type: general    Transport from OR: Transported from OR on room air with adequate spontaneous ventilation    Post pain: adequate analgesia    Post assessment: no apparent anesthetic complications and tolerated procedure well    Post vital signs: stable    Level of consciousness: awake    Nausea/Vomiting: no nausea/vomiting    Complications: none    Transfer of care protocol was followed      Last vitals:   Visit Vitals  /76 (BP Location: Left arm, Patient Position: Lying)   Pulse 87   Temp 36.8 °C (98.2 °F) (Temporal)   Resp 14   Ht 5' 2" (1.575 m)   Wt 82.6 kg (182 lb)   SpO2 98%   Breastfeeding? No   BMI 33.29 kg/m²     "

## 2020-01-20 LAB
FINAL PATHOLOGIC DIAGNOSIS: NORMAL
GROSS: NORMAL

## 2020-02-05 ENCOUNTER — TELEPHONE (OUTPATIENT)
Dept: GASTROENTEROLOGY | Facility: CLINIC | Age: 65
End: 2020-02-05

## 2020-02-05 NOTE — TELEPHONE ENCOUNTER
----- Message from Jon Welch MD sent at 2/4/2020  6:16 PM CST -----  The polyp removed from the colon was benign (no cancerous changes), but considered a risk factor for cancer.  Follow-up as previously recommended with colonoscopy in 5 years.    No other concerning findings seen in the biopsies.    MA to call patient with results.

## 2020-02-14 ENCOUNTER — TELEPHONE (OUTPATIENT)
Dept: PULMONOLOGY | Facility: CLINIC | Age: 65
End: 2020-02-14

## 2020-02-19 ENCOUNTER — TELEPHONE (OUTPATIENT)
Dept: PULMONOLOGY | Facility: CLINIC | Age: 65
End: 2020-02-19

## 2020-02-24 ENCOUNTER — TELEPHONE (OUTPATIENT)
Dept: PULMONOLOGY | Facility: CLINIC | Age: 65
End: 2020-02-24

## 2020-03-30 ENCOUNTER — TELEPHONE (OUTPATIENT)
Dept: NEUROLOGY | Facility: CLINIC | Age: 65
End: 2020-03-30

## 2020-03-30 NOTE — TELEPHONE ENCOUNTER
Called and spoke to Nisreen (daughter) regarding her mother's appt with  on tomorrow. I stated that we have to rescheduled to another day with

## 2020-08-19 ENCOUNTER — HOSPITAL ENCOUNTER (OUTPATIENT)
Dept: RADIOLOGY | Facility: HOSPITAL | Age: 65
Discharge: HOME | End: 2020-08-19
Attending: FAMILY MEDICINE
Payer: MEDICARE

## 2020-08-19 DIAGNOSIS — Z12.31 ENCOUNTER FOR SCREENING MAMMOGRAM FOR MALIGNANT NEOPLASM OF BREAST: ICD-10-CM

## 2020-08-19 PROCEDURE — 77067 SCR MAMMO BI INCL CAD: CPT | Mod: TC,PO

## 2021-02-26 ENCOUNTER — IMMUNIZATION (OUTPATIENT)
Dept: FAMILY MEDICINE | Facility: CLINIC | Age: 66
End: 2021-02-26
Payer: MEDICAID

## 2021-02-26 DIAGNOSIS — Z23 NEED FOR VACCINATION: Primary | ICD-10-CM

## 2021-02-26 PROCEDURE — 91300 COVID-19, MRNA, LNP-S, PF, 30 MCG/0.3 ML DOSE VACCINE: ICD-10-PCS | Mod: S$GLB,,, | Performed by: FAMILY MEDICINE

## 2021-02-26 PROCEDURE — 0001A COVID-19, MRNA, LNP-S, PF, 30 MCG/0.3 ML DOSE VACCINE: CPT | Mod: CV19,S$GLB,, | Performed by: FAMILY MEDICINE

## 2021-02-26 PROCEDURE — 0001A COVID-19, MRNA, LNP-S, PF, 30 MCG/0.3 ML DOSE VACCINE: ICD-10-PCS | Mod: CV19,S$GLB,, | Performed by: FAMILY MEDICINE

## 2021-02-26 PROCEDURE — 91300 COVID-19, MRNA, LNP-S, PF, 30 MCG/0.3 ML DOSE VACCINE: CPT | Mod: S$GLB,,, | Performed by: FAMILY MEDICINE

## 2021-03-19 ENCOUNTER — IMMUNIZATION (OUTPATIENT)
Dept: FAMILY MEDICINE | Facility: CLINIC | Age: 66
End: 2021-03-19
Payer: MEDICARE

## 2021-03-19 DIAGNOSIS — Z23 NEED FOR VACCINATION: Primary | ICD-10-CM

## 2021-03-19 PROCEDURE — 91300 COVID-19, MRNA, LNP-S, PF, 30 MCG/0.3 ML DOSE VACCINE: ICD-10-PCS | Mod: S$GLB,,, | Performed by: FAMILY MEDICINE

## 2021-03-19 PROCEDURE — 0002A COVID-19, MRNA, LNP-S, PF, 30 MCG/0.3 ML DOSE VACCINE: CPT | Mod: CV19,S$GLB,, | Performed by: FAMILY MEDICINE

## 2021-03-19 PROCEDURE — 91300 COVID-19, MRNA, LNP-S, PF, 30 MCG/0.3 ML DOSE VACCINE: CPT | Mod: S$GLB,,, | Performed by: FAMILY MEDICINE

## 2021-03-19 PROCEDURE — 0002A COVID-19, MRNA, LNP-S, PF, 30 MCG/0.3 ML DOSE VACCINE: ICD-10-PCS | Mod: CV19,S$GLB,, | Performed by: FAMILY MEDICINE

## 2021-05-05 ENCOUNTER — TELEPHONE (OUTPATIENT)
Dept: GASTROENTEROLOGY | Facility: CLINIC | Age: 66
End: 2021-05-05

## 2021-05-05 RX ORDER — PANTOPRAZOLE SODIUM 40 MG/1
40 TABLET, DELAYED RELEASE ORAL DAILY
Qty: 90 TABLET | Refills: 3 | Status: SHIPPED | OUTPATIENT
Start: 2021-05-05 | End: 2022-02-10

## 2021-05-12 ENCOUNTER — TELEPHONE (OUTPATIENT)
Dept: NEUROLOGY | Facility: CLINIC | Age: 66
End: 2021-05-12

## 2021-06-11 ENCOUNTER — TELEPHONE (OUTPATIENT)
Dept: NEUROLOGY | Facility: CLINIC | Age: 66
End: 2021-06-11

## 2021-07-14 ENCOUNTER — OFFICE VISIT (OUTPATIENT)
Dept: NEUROLOGY | Facility: CLINIC | Age: 66
End: 2021-07-14
Payer: MEDICARE

## 2021-07-14 VITALS
SYSTOLIC BLOOD PRESSURE: 149 MMHG | BODY MASS INDEX: 34.14 KG/M2 | WEIGHT: 185.5 LBS | HEIGHT: 62 IN | DIASTOLIC BLOOD PRESSURE: 83 MMHG | HEART RATE: 93 BPM

## 2021-07-14 DIAGNOSIS — E78.2 MIXED HYPERLIPIDEMIA: ICD-10-CM

## 2021-07-14 DIAGNOSIS — Z86.73 HISTORY OF ISCHEMIC STROKE: ICD-10-CM

## 2021-07-14 DIAGNOSIS — G81.91 RIGHT HEMIPARESIS: ICD-10-CM

## 2021-07-14 DIAGNOSIS — R51.9 NONINTRACTABLE HEADACHE, UNSPECIFIED CHRONICITY PATTERN, UNSPECIFIED HEADACHE TYPE: Primary | ICD-10-CM

## 2021-07-14 DIAGNOSIS — I10 ESSENTIAL HYPERTENSION: ICD-10-CM

## 2021-07-14 PROCEDURE — 3288F FALL RISK ASSESSMENT DOCD: CPT | Mod: CPTII,S$GLB,, | Performed by: NURSE PRACTITIONER

## 2021-07-14 PROCEDURE — 1126F PR PAIN SEVERITY QUANTIFIED, NO PAIN PRESENT: ICD-10-PCS | Mod: S$GLB,,, | Performed by: NURSE PRACTITIONER

## 2021-07-14 PROCEDURE — 99999 PR PBB SHADOW E&M-EST. PATIENT-LVL V: CPT | Mod: PBBFAC,,, | Performed by: NURSE PRACTITIONER

## 2021-07-14 PROCEDURE — 1159F PR MEDICATION LIST DOCUMENTED IN MEDICAL RECORD: ICD-10-PCS | Mod: S$GLB,,, | Performed by: NURSE PRACTITIONER

## 2021-07-14 PROCEDURE — 3288F PR FALLS RISK ASSESSMENT DOCUMENTED: ICD-10-PCS | Mod: CPTII,S$GLB,, | Performed by: NURSE PRACTITIONER

## 2021-07-14 PROCEDURE — 1101F PT FALLS ASSESS-DOCD LE1/YR: CPT | Mod: CPTII,S$GLB,, | Performed by: NURSE PRACTITIONER

## 2021-07-14 PROCEDURE — 99999 PR PBB SHADOW E&M-EST. PATIENT-LVL V: ICD-10-PCS | Mod: PBBFAC,,, | Performed by: NURSE PRACTITIONER

## 2021-07-14 PROCEDURE — 1101F PR PT FALLS ASSESS DOC 0-1 FALLS W/OUT INJ PAST YR: ICD-10-PCS | Mod: CPTII,S$GLB,, | Performed by: NURSE PRACTITIONER

## 2021-07-14 PROCEDURE — 99204 OFFICE O/P NEW MOD 45 MIN: CPT | Mod: S$GLB,,, | Performed by: NURSE PRACTITIONER

## 2021-07-14 PROCEDURE — 1159F MED LIST DOCD IN RCRD: CPT | Mod: S$GLB,,, | Performed by: NURSE PRACTITIONER

## 2021-07-14 PROCEDURE — 3008F BODY MASS INDEX DOCD: CPT | Mod: CPTII,S$GLB,, | Performed by: NURSE PRACTITIONER

## 2021-07-14 PROCEDURE — 99204 PR OFFICE/OUTPT VISIT, NEW, LEVL IV, 45-59 MIN: ICD-10-PCS | Mod: S$GLB,,, | Performed by: NURSE PRACTITIONER

## 2021-07-14 PROCEDURE — 3008F PR BODY MASS INDEX (BMI) DOCUMENTED: ICD-10-PCS | Mod: CPTII,S$GLB,, | Performed by: NURSE PRACTITIONER

## 2021-07-14 PROCEDURE — 1126F AMNT PAIN NOTED NONE PRSNT: CPT | Mod: S$GLB,,, | Performed by: NURSE PRACTITIONER

## 2021-07-14 PROCEDURE — 3077F SYST BP >= 140 MM HG: CPT | Mod: CPTII,S$GLB,, | Performed by: NURSE PRACTITIONER

## 2021-07-14 PROCEDURE — 3079F PR MOST RECENT DIASTOLIC BLOOD PRESSURE 80-89 MM HG: ICD-10-PCS | Mod: CPTII,S$GLB,, | Performed by: NURSE PRACTITIONER

## 2021-07-14 PROCEDURE — 3079F DIAST BP 80-89 MM HG: CPT | Mod: CPTII,S$GLB,, | Performed by: NURSE PRACTITIONER

## 2021-07-14 PROCEDURE — 3077F PR MOST RECENT SYSTOLIC BLOOD PRESSURE >= 140 MM HG: ICD-10-PCS | Mod: CPTII,S$GLB,, | Performed by: NURSE PRACTITIONER

## 2021-07-14 RX ORDER — GLYCOPYRROLATE AND FORMOTEROL FUMARATE 9; 4.8 UG/1; UG/1
AEROSOL, METERED RESPIRATORY (INHALATION)
COMMUNITY
Start: 2021-04-11

## 2021-07-28 ENCOUNTER — HOSPITAL ENCOUNTER (OUTPATIENT)
Dept: RADIOLOGY | Facility: HOSPITAL | Age: 66
Discharge: HOME | End: 2021-07-28
Attending: NURSE PRACTITIONER
Payer: MEDICARE

## 2021-07-28 DIAGNOSIS — R05.9 COUGH: Primary | ICD-10-CM

## 2021-07-28 DIAGNOSIS — R51.9 NONINTRACTABLE HEADACHE, UNSPECIFIED CHRONICITY PATTERN, UNSPECIFIED HEADACHE TYPE: ICD-10-CM

## 2021-07-28 PROCEDURE — 25500020 PHARM REV CODE 255: Performed by: NURSE PRACTITIONER

## 2021-07-28 PROCEDURE — 70553 MRI BRAIN STEM W/O & W/DYE: CPT | Mod: TC

## 2021-07-28 PROCEDURE — 70553 MRI BRAIN STEM W/O & W/DYE: CPT | Mod: 26,,, | Performed by: INTERNAL MEDICINE

## 2021-07-28 PROCEDURE — A9585 GADOBUTROL INJECTION: HCPCS | Performed by: NURSE PRACTITIONER

## 2021-07-28 PROCEDURE — 70553 MRI BRAIN W WO CONTRAST: ICD-10-PCS | Mod: 26,,, | Performed by: INTERNAL MEDICINE

## 2021-07-28 RX ORDER — GADOBUTROL 604.72 MG/ML
10 INJECTION INTRAVENOUS
Status: COMPLETED | OUTPATIENT
Start: 2021-07-28 | End: 2021-07-28

## 2021-07-28 RX ADMIN — GADOBUTROL 10 ML: 604.72 INJECTION INTRAVENOUS at 02:07

## 2021-09-02 ENCOUNTER — PATIENT MESSAGE (OUTPATIENT)
Dept: NEUROLOGY | Facility: CLINIC | Age: 66
End: 2021-09-02

## 2021-11-15 ENCOUNTER — LAB VISIT (OUTPATIENT)
Dept: LAB | Facility: HOSPITAL | Age: 66
End: 2021-11-15
Attending: PHYSICAL MEDICINE & REHABILITATION
Payer: MEDICARE

## 2021-11-15 DIAGNOSIS — R79.1 ABNORMAL COAGULATION PROFILE: ICD-10-CM

## 2021-11-15 DIAGNOSIS — M54.50 LOW BACK PAIN, UNSPECIFIED: Primary | ICD-10-CM

## 2021-11-15 DIAGNOSIS — E11.9 DIABETES MELLITUS WITHOUT COMPLICATION: ICD-10-CM

## 2021-11-15 LAB
ALBUMIN SERPL BCP-MCNC: 3.7 G/DL (ref 3.5–5.2)
ALP SERPL-CCNC: 92 U/L (ref 38–126)
ALT SERPL W/O P-5'-P-CCNC: 18 U/L (ref 10–44)
ANION GAP SERPL CALC-SCNC: 8 MMOL/L (ref 8–16)
APTT BLDCRRT: 24.9 SEC (ref 21–32)
AST SERPL-CCNC: 34 U/L (ref 15–46)
BASOPHILS # BLD AUTO: 0.03 K/UL (ref 0–0.2)
BASOPHILS NFR BLD: 0.4 % (ref 0–1.9)
BILIRUB SERPL-MCNC: 0.4 MG/DL (ref 0.1–1)
CALCIUM SERPL-MCNC: 8.4 MG/DL (ref 8.7–10.5)
CHLORIDE SERPL-SCNC: 110 MMOL/L (ref 95–110)
CO2 SERPL-SCNC: 26 MMOL/L (ref 23–29)
CREAT SERPL-MCNC: 1.37 MG/DL (ref 0.5–1.4)
DIFFERENTIAL METHOD: ABNORMAL
EOSINOPHIL # BLD AUTO: 0.1 K/UL (ref 0–0.5)
EOSINOPHIL NFR BLD: 1.3 % (ref 0–8)
ERYTHROCYTE [DISTWIDTH] IN BLOOD BY AUTOMATED COUNT: 13 % (ref 11.5–14.5)
EST. GFR  (AFRICAN AMERICAN): 46 ML/MIN/1.73 M^2
EST. GFR  (NON AFRICAN AMERICAN): 39.9 ML/MIN/1.73 M^2
ESTIMATED AVG GLUCOSE: 157 MG/DL (ref 68–131)
GLUCOSE SERPL-MCNC: 87 MG/DL (ref 70–110)
HBA1C MFR BLD: 7.1 % (ref 4–5.6)
HCT VFR BLD AUTO: 29.8 % (ref 37–48.5)
HGB BLD-MCNC: 9.6 G/DL (ref 12–16)
IMM GRANULOCYTES # BLD AUTO: 0.02 K/UL (ref 0–0.04)
IMM GRANULOCYTES NFR BLD AUTO: 0.3 % (ref 0–0.5)
INR PPP: 1.1 (ref 0.8–1.2)
LYMPHOCYTES # BLD AUTO: 2.9 K/UL (ref 1–4.8)
LYMPHOCYTES NFR BLD: 37.7 % (ref 18–48)
MCH RBC QN AUTO: 29 PG (ref 27–31)
MCHC RBC AUTO-ENTMCNC: 32.2 G/DL (ref 32–36)
MCV RBC AUTO: 90 FL (ref 82–98)
MONOCYTES # BLD AUTO: 0.3 K/UL (ref 0.3–1)
MONOCYTES NFR BLD: 4.4 % (ref 4–15)
NEUTROPHILS # BLD AUTO: 4.2 K/UL (ref 1.8–7.7)
NEUTROPHILS NFR BLD: 55.9 % (ref 38–73)
NRBC BLD-RTO: 0 /100 WBC
PLATELET # BLD AUTO: 203 K/UL (ref 150–450)
PMV BLD AUTO: 11.5 FL (ref 9.2–12.9)
POTASSIUM SERPL-SCNC: 4.2 MMOL/L (ref 3.5–5.1)
PROT SERPL-MCNC: 7.3 G/DL (ref 6–8.4)
PROTHROMBIN TIME: 11 SEC (ref 9–12.5)
RBC # BLD AUTO: 3.31 M/UL (ref 4–5.4)
SODIUM SERPL-SCNC: 144 MMOL/L (ref 136–145)
UUN UR-MCNC: 19 MG/DL (ref 7–17)
WBC # BLD AUTO: 7.55 K/UL (ref 3.9–12.7)

## 2021-11-15 PROCEDURE — 85730 THROMBOPLASTIN TIME PARTIAL: CPT | Mod: PO | Performed by: PHYSICAL MEDICINE & REHABILITATION

## 2021-11-15 PROCEDURE — 80053 COMPREHEN METABOLIC PANEL: CPT | Mod: PO | Performed by: PHYSICAL MEDICINE & REHABILITATION

## 2021-11-15 PROCEDURE — 85025 COMPLETE CBC W/AUTO DIFF WBC: CPT | Mod: PO | Performed by: PHYSICAL MEDICINE & REHABILITATION

## 2021-11-15 PROCEDURE — 36415 COLL VENOUS BLD VENIPUNCTURE: CPT | Mod: PO | Performed by: PHYSICAL MEDICINE & REHABILITATION

## 2021-11-15 PROCEDURE — 83036 HEMOGLOBIN GLYCOSYLATED A1C: CPT | Performed by: PHYSICAL MEDICINE & REHABILITATION

## 2021-11-15 PROCEDURE — 85610 PROTHROMBIN TIME: CPT | Mod: PO | Performed by: PHYSICAL MEDICINE & REHABILITATION

## 2021-12-22 ENCOUNTER — OFFICE VISIT (OUTPATIENT)
Dept: HEMATOLOGY/ONCOLOGY | Facility: CLINIC | Age: 66
End: 2021-12-22
Payer: MEDICARE

## 2021-12-22 VITALS
BODY MASS INDEX: 33.68 KG/M2 | DIASTOLIC BLOOD PRESSURE: 83 MMHG | OXYGEN SATURATION: 97 % | SYSTOLIC BLOOD PRESSURE: 159 MMHG | HEART RATE: 88 BPM | HEIGHT: 62 IN | WEIGHT: 183 LBS

## 2021-12-22 DIAGNOSIS — D64.89 ANEMIA DUE TO OTHER CAUSE, NOT CLASSIFIED: ICD-10-CM

## 2021-12-22 DIAGNOSIS — D53.9 NUTRITIONAL ANEMIA, UNSPECIFIED: ICD-10-CM

## 2021-12-22 DIAGNOSIS — R71.8 OTHER ABNORMALITY OF RED BLOOD CELLS: ICD-10-CM

## 2021-12-22 PROCEDURE — 3079F PR MOST RECENT DIASTOLIC BLOOD PRESSURE 80-89 MM HG: ICD-10-PCS | Mod: CPTII,S$GLB,, | Performed by: INTERNAL MEDICINE

## 2021-12-22 PROCEDURE — 3008F PR BODY MASS INDEX (BMI) DOCUMENTED: ICD-10-PCS | Mod: CPTII,S$GLB,, | Performed by: INTERNAL MEDICINE

## 2021-12-22 PROCEDURE — 3288F PR FALLS RISK ASSESSMENT DOCUMENTED: ICD-10-PCS | Mod: CPTII,S$GLB,, | Performed by: INTERNAL MEDICINE

## 2021-12-22 PROCEDURE — 3077F PR MOST RECENT SYSTOLIC BLOOD PRESSURE >= 140 MM HG: ICD-10-PCS | Mod: CPTII,S$GLB,, | Performed by: INTERNAL MEDICINE

## 2021-12-22 PROCEDURE — 3051F PR MOST RECENT HEMOGLOBIN A1C LEVEL 7.0 - < 8.0%: ICD-10-PCS | Mod: CPTII,S$GLB,, | Performed by: INTERNAL MEDICINE

## 2021-12-22 PROCEDURE — 1101F PR PT FALLS ASSESS DOC 0-1 FALLS W/OUT INJ PAST YR: ICD-10-PCS | Mod: CPTII,S$GLB,, | Performed by: INTERNAL MEDICINE

## 2021-12-22 PROCEDURE — 99204 OFFICE O/P NEW MOD 45 MIN: CPT | Mod: S$GLB,,, | Performed by: INTERNAL MEDICINE

## 2021-12-22 PROCEDURE — 99999 PR PBB SHADOW E&M-EST. PATIENT-LVL IV: ICD-10-PCS | Mod: PBBFAC,,, | Performed by: INTERNAL MEDICINE

## 2021-12-22 PROCEDURE — 1126F AMNT PAIN NOTED NONE PRSNT: CPT | Mod: CPTII,S$GLB,, | Performed by: INTERNAL MEDICINE

## 2021-12-22 PROCEDURE — 1160F PR REVIEW ALL MEDS BY PRESCRIBER/CLIN PHARMACIST DOCUMENTED: ICD-10-PCS | Mod: CPTII,S$GLB,, | Performed by: INTERNAL MEDICINE

## 2021-12-22 PROCEDURE — 1159F PR MEDICATION LIST DOCUMENTED IN MEDICAL RECORD: ICD-10-PCS | Mod: CPTII,S$GLB,, | Performed by: INTERNAL MEDICINE

## 2021-12-22 PROCEDURE — 3288F FALL RISK ASSESSMENT DOCD: CPT | Mod: CPTII,S$GLB,, | Performed by: INTERNAL MEDICINE

## 2021-12-22 PROCEDURE — 1101F PT FALLS ASSESS-DOCD LE1/YR: CPT | Mod: CPTII,S$GLB,, | Performed by: INTERNAL MEDICINE

## 2021-12-22 PROCEDURE — 4010F ACE/ARB THERAPY RXD/TAKEN: CPT | Mod: CPTII,S$GLB,, | Performed by: INTERNAL MEDICINE

## 2021-12-22 PROCEDURE — 1160F RVW MEDS BY RX/DR IN RCRD: CPT | Mod: CPTII,S$GLB,, | Performed by: INTERNAL MEDICINE

## 2021-12-22 PROCEDURE — 1126F PR PAIN SEVERITY QUANTIFIED, NO PAIN PRESENT: ICD-10-PCS | Mod: CPTII,S$GLB,, | Performed by: INTERNAL MEDICINE

## 2021-12-22 PROCEDURE — 99999 PR PBB SHADOW E&M-EST. PATIENT-LVL IV: CPT | Mod: PBBFAC,,, | Performed by: INTERNAL MEDICINE

## 2021-12-22 PROCEDURE — 4010F PR ACE/ARB THEARPY RXD/TAKEN: ICD-10-PCS | Mod: CPTII,S$GLB,, | Performed by: INTERNAL MEDICINE

## 2021-12-22 PROCEDURE — 3079F DIAST BP 80-89 MM HG: CPT | Mod: CPTII,S$GLB,, | Performed by: INTERNAL MEDICINE

## 2021-12-22 PROCEDURE — 3077F SYST BP >= 140 MM HG: CPT | Mod: CPTII,S$GLB,, | Performed by: INTERNAL MEDICINE

## 2021-12-22 PROCEDURE — 3051F HG A1C>EQUAL 7.0%<8.0%: CPT | Mod: CPTII,S$GLB,, | Performed by: INTERNAL MEDICINE

## 2021-12-22 PROCEDURE — 1159F MED LIST DOCD IN RCRD: CPT | Mod: CPTII,S$GLB,, | Performed by: INTERNAL MEDICINE

## 2021-12-22 PROCEDURE — 3008F BODY MASS INDEX DOCD: CPT | Mod: CPTII,S$GLB,, | Performed by: INTERNAL MEDICINE

## 2021-12-22 PROCEDURE — 99204 PR OFFICE/OUTPT VISIT, NEW, LEVL IV, 45-59 MIN: ICD-10-PCS | Mod: S$GLB,,, | Performed by: INTERNAL MEDICINE

## 2022-01-04 ENCOUNTER — OFFICE VISIT (OUTPATIENT)
Dept: HEMATOLOGY/ONCOLOGY | Facility: CLINIC | Age: 67
End: 2022-01-04
Payer: MEDICARE

## 2022-01-04 ENCOUNTER — IMMUNIZATION (OUTPATIENT)
Dept: INTERNAL MEDICINE | Facility: CLINIC | Age: 67
End: 2022-01-04
Payer: MEDICARE

## 2022-01-04 VITALS
HEIGHT: 62 IN | BODY MASS INDEX: 33.43 KG/M2 | HEART RATE: 92 BPM | DIASTOLIC BLOOD PRESSURE: 86 MMHG | TEMPERATURE: 98 F | OXYGEN SATURATION: 95 % | SYSTOLIC BLOOD PRESSURE: 160 MMHG | WEIGHT: 181.69 LBS | RESPIRATION RATE: 18 BRPM

## 2022-01-04 DIAGNOSIS — D64.89 ANEMIA DUE TO OTHER CAUSE, NOT CLASSIFIED: ICD-10-CM

## 2022-01-04 DIAGNOSIS — D47.2 MGUS (MONOCLONAL GAMMOPATHY OF UNKNOWN SIGNIFICANCE): Primary | ICD-10-CM

## 2022-01-04 DIAGNOSIS — Z23 NEED FOR VACCINATION: Primary | ICD-10-CM

## 2022-01-04 PROCEDURE — 0004A COVID-19, MRNA, LNP-S, PF, 30 MCG/0.3 ML DOSE VACCINE: CPT | Mod: CV19,PBBFAC | Performed by: INTERNAL MEDICINE

## 2022-01-04 PROCEDURE — 1159F PR MEDICATION LIST DOCUMENTED IN MEDICAL RECORD: ICD-10-PCS | Mod: CPTII,S$GLB,, | Performed by: INTERNAL MEDICINE

## 2022-01-04 PROCEDURE — 3288F FALL RISK ASSESSMENT DOCD: CPT | Mod: CPTII,S$GLB,, | Performed by: INTERNAL MEDICINE

## 2022-01-04 PROCEDURE — 1126F PR PAIN SEVERITY QUANTIFIED, NO PAIN PRESENT: ICD-10-PCS | Mod: CPTII,S$GLB,, | Performed by: INTERNAL MEDICINE

## 2022-01-04 PROCEDURE — 3079F PR MOST RECENT DIASTOLIC BLOOD PRESSURE 80-89 MM HG: ICD-10-PCS | Mod: CPTII,S$GLB,, | Performed by: INTERNAL MEDICINE

## 2022-01-04 PROCEDURE — 99999 PR PBB SHADOW E&M-EST. PATIENT-LVL IV: CPT | Mod: PBBFAC,,, | Performed by: INTERNAL MEDICINE

## 2022-01-04 PROCEDURE — 99215 PR OFFICE/OUTPT VISIT, EST, LEVL V, 40-54 MIN: ICD-10-PCS | Mod: S$GLB,,, | Performed by: INTERNAL MEDICINE

## 2022-01-04 PROCEDURE — 1101F PT FALLS ASSESS-DOCD LE1/YR: CPT | Mod: CPTII,S$GLB,, | Performed by: INTERNAL MEDICINE

## 2022-01-04 PROCEDURE — 1126F AMNT PAIN NOTED NONE PRSNT: CPT | Mod: CPTII,S$GLB,, | Performed by: INTERNAL MEDICINE

## 2022-01-04 PROCEDURE — 3008F PR BODY MASS INDEX (BMI) DOCUMENTED: ICD-10-PCS | Mod: CPTII,S$GLB,, | Performed by: INTERNAL MEDICINE

## 2022-01-04 PROCEDURE — 3079F DIAST BP 80-89 MM HG: CPT | Mod: CPTII,S$GLB,, | Performed by: INTERNAL MEDICINE

## 2022-01-04 PROCEDURE — 3008F BODY MASS INDEX DOCD: CPT | Mod: CPTII,S$GLB,, | Performed by: INTERNAL MEDICINE

## 2022-01-04 PROCEDURE — 99215 OFFICE O/P EST HI 40 MIN: CPT | Mod: S$GLB,,, | Performed by: INTERNAL MEDICINE

## 2022-01-04 PROCEDURE — 1159F MED LIST DOCD IN RCRD: CPT | Mod: CPTII,S$GLB,, | Performed by: INTERNAL MEDICINE

## 2022-01-04 PROCEDURE — 3288F PR FALLS RISK ASSESSMENT DOCUMENTED: ICD-10-PCS | Mod: CPTII,S$GLB,, | Performed by: INTERNAL MEDICINE

## 2022-01-04 PROCEDURE — 99999 PR PBB SHADOW E&M-EST. PATIENT-LVL IV: ICD-10-PCS | Mod: PBBFAC,,, | Performed by: INTERNAL MEDICINE

## 2022-01-04 PROCEDURE — 3077F PR MOST RECENT SYSTOLIC BLOOD PRESSURE >= 140 MM HG: ICD-10-PCS | Mod: CPTII,S$GLB,, | Performed by: INTERNAL MEDICINE

## 2022-01-04 PROCEDURE — 3077F SYST BP >= 140 MM HG: CPT | Mod: CPTII,S$GLB,, | Performed by: INTERNAL MEDICINE

## 2022-01-04 PROCEDURE — 1101F PR PT FALLS ASSESS DOC 0-1 FALLS W/OUT INJ PAST YR: ICD-10-PCS | Mod: CPTII,S$GLB,, | Performed by: INTERNAL MEDICINE

## 2022-01-04 NOTE — Clinical Note
She needs a skeletal survey and 24 hour upep and DEVI, to be done in [place].  She needs a bone marrow biopsy and see me in 6 weeks

## 2022-01-04 NOTE — PROGRESS NOTES
PROGRESS NOTE    Subjective:       Patient ID: Soila Hairston is a 66 y.o. female.  MRN: [id number]  : 1955    Chief Complaint: Anemia     History of Present Illness:   Soila Hairston is a 66 y.o. female who presents with chronic anemia. She has a history of right hemiparesis, on anticoagulation and a history of colitis.     Interim history:    She has completed anemia work up and is here to discuss results.   She reports right knee and leg pain . She denies any fever, chills or unintentional weight loss. She has a stable right sided weakness, stable appetite and PS    Work up showed IgG kappa Monoclonal band 0.73 g/dl.    Oncology History:  Oncology History    No history exists.       History:  Past Medical History:   Diagnosis Date    Anticoagulant long-term use     Colitis     Colon polyp     Depression     Diabetes mellitus, type 2     Diverticulosis of colon     Foot drop     a couple weeks ago    Hemorrhoids     Hyperlipidemia     Hypertension     SOB (shortness of breath)     Stroke 2015      Past Surgical History:   Procedure Laterality Date    ABSCESS DRAINAGE Right     breast    COLONOSCOPY N/A 10/18/2019    Procedure: COLONOSCOPY2 day [facility];  Surgeon: Adryan Schultz MD;  Location: [facility];  Service: General;  Laterality: N/A;    EPIDURAL STEROID INJECTION      ESOPHAGOGASTRODUODENOSCOPY N/A 10/18/2019    Procedure: EGD (ESOPHAGOGASTRODUODENOSCOPY);  Surgeon: Adryan Schultz MD;  Location: [facility];  Service: General;  Laterality: N/A;    TRANSFORAMINAL EPIDURAL INJECTION OF STEROID Left 2019    Procedure: INJECTION, STEROID, EPIDURAL, TRANSFORAMINAL APPROACH [3999];  Surgeon: Ad Patel III, MD;  Location: [facility] OR;  Service: Pain Management;  Laterality: Left;  L5/S1     Family History   Problem Relation Age of Onset    Dementia Mother     Breast cancer Mother      Social  "History     Tobacco Use    Smoking status: Current Some Day Smoker     Packs/day: 0.25     Years: 40.00     Pack years: 10.00     Types: Cigarettes    Smokeless tobacco: Never Used    Tobacco comment: smoke 5 cigs a day   Substance and Sexual Activity    Alcohol use: No     Alcohol/week: 0.0 standard drinks    Drug use: No    Sexual activity: Not Currently        ROS:   ROS     Objective:     Vitals:    01/04/22 1142   BP: (!) 160/86   Pulse: 92   Resp: 18   Temp: 97.7 °F (36.5 °C)   TempSrc: Oral   SpO2: 95%   Weight: 82.4 kg (181 lb 10.5 oz)   Height: 5' 2" (1.575 m)   PainSc: 0-No pain     Wt Readings from Last 10 Encounters:   01/04/22 82.4 kg (181 lb 10.5 oz)   12/22/21 83 kg (182 lb 15.7 oz)   07/14/21 84.1 kg (185 lb 8.3 oz)   01/13/20 82.6 kg (182 lb)   10/18/19 83.9 kg (185 lb)   05/27/19 84.4 kg (186 lb)   02/20/19 84.7 kg (186 lb 11.7 oz)   02/06/19 77.1 kg (170 lb)   10/09/18 77.1 kg (170 lb)   09/06/18 77.1 kg (170 lb)       Physical Examination:   Physical Exam     Diagnostic Tests:  Significant Imaging: I have reviewed and interpreted all pertinent imaging results/findings.  CT Chest Without Contrast No results found for this or any previous visit.    CT Chest With ContrastNo results found for this or any previous visit.    CT Abdomen/Pelvis Without Contrast No results found for this or any previous visit.     CT Abdomen/Pelvis With Contrast No results found for this or any previous visit.    CT Abdomen/Pelvis With Without Contrast No results found for this or any previous visit.     PET Scan No results found for this or any previous visit.      Laboratory Data:  All pertinent labs have been reviewed.  Peripheral smear:  PATHOLOGIST INTERPRETATION   RBC- Normocytic anemia without significant morphologic abnormalities.   Rare schistocyte are noted, but no overt features of hemolysis.   WBC- Normal morphology, normal differential. No dysplastic cells or   circulating blasts are present.   PLT- " Normal in number and morphology.   Interpreted by Linda Rojas M.D.     Myeloma panel:  Quantitative IG normal:   K/L ratio 2.49 elevated   IgG kappa Monoclonal band 0.73g/dl    Labs:   Lab Results   Component Value Date    WBC 9.60 12/22/2021    RBC 3.24 (L) 12/22/2021    HGB 9.4 (L) 12/22/2021    HCT 28.7 (L) 12/22/2021    MCV 89 12/22/2021     12/22/2021    GLU 87 11/15/2021     11/15/2021    K 4.2 11/15/2021    BUN 19 (H) 11/15/2021    CREATININE 1.37 11/15/2021    AST 34 11/15/2021    ALT 18 11/15/2021    BILITOT 0.4 11/15/2021       Assessment/Plan:   MGUS (monoclonal gammopathy of unknown significance)    Work up is reviewed. She has normocytic anemia in the 9 g/dl range, without evidence for hemolysis or nutritional deficiencies.  She does have IgG kappa monoclonal gammopathy and abnormal K/L ration, anemia and renal insufficiency, needs work up to rule out multiple myeloma.   I will request a 24 hour urine DEVI,  bone marrow biopsy and skeletal survey and see her in clinic once the work up is completed.      -     BONE MET SURVEY; Future; Expected date: 01/04/2022  -     Immunofixation, 24 hour Urine; Future  -     Protein electrophoresis, timed urine; Future  -     Bone marrow; Future; Expected date: 01/04/2022  -     Leukemia/Lymphoma Screen - Bone Marrow Right Posterior Iliac Crest; Future; Expected date: 01/04/2022  -     Chromosome Analysis, Bone Marrow Right Posterior Iliac Crest; Future; Expected date: 01/04/2022  -     Specimen to Pathology, Bone Marrow Aspiration/Biopsy; Future; Expected date: 01/04/2022    Anemia due to other cause, not classified  As above.      ECOG SCORE    2 - Capable of all selfcare but unable to carry out any work activities, active > 50% of hours           Discussion:   Follow up in about 6 weeks (around 2/15/2022) for MD, Lab Results.    Plan was discussed with the patient at length, and she verbalized understanding. Soila was given an opportunity to  ask questions that were answered to her satisfaction, and she was advised to call in the interval if any problems or questions arise.    Electronically signed by Jeanette Padgett MD

## 2022-01-14 ENCOUNTER — TELEPHONE (OUTPATIENT)
Dept: HEMATOLOGY/ONCOLOGY | Facility: CLINIC | Age: 67
End: 2022-01-14
Payer: MEDICARE

## 2022-01-18 ENCOUNTER — TELEPHONE (OUTPATIENT)
Dept: HEMATOLOGY/ONCOLOGY | Facility: CLINIC | Age: 67
End: 2022-01-18
Payer: MEDICARE

## 2022-02-07 ENCOUNTER — TELEPHONE (OUTPATIENT)
Dept: HEMATOLOGY/ONCOLOGY | Facility: CLINIC | Age: 67
End: 2022-02-07
Payer: MEDICARE

## 2022-02-07 DIAGNOSIS — D47.2 MGUS (MONOCLONAL GAMMOPATHY OF UNKNOWN SIGNIFICANCE): Primary | ICD-10-CM

## 2022-02-07 NOTE — TELEPHONE ENCOUNTER
Attempted to call patient to inform a covid swab is needed prior to the bmbx. Preferably 11:30am or earlier

## 2022-02-22 ENCOUNTER — TELEPHONE (OUTPATIENT)
Dept: HEMATOLOGY/ONCOLOGY | Facility: CLINIC | Age: 67
End: 2022-02-22
Payer: MEDICARE

## 2022-02-23 ENCOUNTER — TELEPHONE (OUTPATIENT)
Dept: HEMATOLOGY/ONCOLOGY | Facility: CLINIC | Age: 67
End: 2022-02-23
Payer: MEDICARE

## 2022-02-23 NOTE — TELEPHONE ENCOUNTER
"----- Message from Rowdy Gay sent at 2/23/2022  9:56 AM CST -----  Reschedule Existing Appointment        Appt Date: 2/23    Type of appt:      Physician: Lorin    Reason for rescheduling? Appt had to be cancelled due to date no longer working     Caller: Nisreen (Daughter)    Contact Preference: [phone]            Additional Information: Requesting to speak w/ nurse before r/s      "Thank you for all that you do for our patients"     "

## 2022-03-15 ENCOUNTER — HOSPITAL ENCOUNTER (OUTPATIENT)
Dept: CARDIOLOGY | Facility: HOSPITAL | Age: 67
Discharge: HOME | End: 2022-03-15
Attending: FAMILY MEDICINE
Payer: MEDICARE

## 2022-03-15 DIAGNOSIS — Z01.810 PRE-OPERATIVE CARDIOVASCULAR EXAMINATION: ICD-10-CM

## 2022-03-15 PROCEDURE — 93005 ELECTROCARDIOGRAM TRACING: CPT | Mod: PO

## 2022-03-15 PROCEDURE — 93010 ELECTROCARDIOGRAM REPORT: CPT | Mod: ,,, | Performed by: INTERNAL MEDICINE

## 2022-03-15 PROCEDURE — 93010 EKG 12-LEAD: ICD-10-PCS | Mod: ,,, | Performed by: INTERNAL MEDICINE

## 2022-04-20 DIAGNOSIS — J44.9 CHRONIC OBSTRUCTIVE PULMONARY DISEASE (COPD): Primary | ICD-10-CM

## 2022-06-05 ENCOUNTER — PATIENT MESSAGE (OUTPATIENT)
Dept: NEUROLOGY | Facility: CLINIC | Age: 67
End: 2022-06-05
Payer: MEDICARE

## 2022-06-05 ENCOUNTER — PATIENT MESSAGE (OUTPATIENT)
Dept: HEMATOLOGY/ONCOLOGY | Facility: CLINIC | Age: 67
End: 2022-06-05
Payer: MEDICARE

## 2022-06-06 ENCOUNTER — PATIENT MESSAGE (OUTPATIENT)
Dept: NEUROLOGY | Facility: CLINIC | Age: 67
End: 2022-06-06
Payer: MEDICARE

## 2022-06-06 ENCOUNTER — DOCUMENTATION ONLY (OUTPATIENT)
Dept: HEMATOLOGY/ONCOLOGY | Facility: CLINIC | Age: 67
End: 2022-06-06
Payer: MEDICARE

## 2022-06-06 NOTE — TELEPHONE ENCOUNTER
Yes, she can be transfused as needed, at the discretion of the doctors taking care of her. Thanks

## 2022-06-07 ENCOUNTER — TELEPHONE (OUTPATIENT)
Dept: NEUROLOGY | Facility: CLINIC | Age: 67
End: 2022-06-07
Payer: MEDICARE

## 2022-06-07 DIAGNOSIS — I63.89 OTHER CEREBRAL INFARCTION: ICD-10-CM

## 2022-06-07 NOTE — TELEPHONE ENCOUNTER
Spoke with pt's daughter, Nisreen. Dr. Galeana was contacted while on telestroke this past weekend when patient presented to [facility] ED with slurred speech. In setting of hx of stroke, CTH was completed showing no new strokes. Dr. Galeana messaged me and made recs for follow-up MRI and MRV. Rec for echo as well. MRI and MRV currently scheduled for 6/23/22 @ 4:00pm at [facility]. Pending insurance auth.

## 2022-06-08 ENCOUNTER — TELEPHONE (OUTPATIENT)
Dept: HEMATOLOGY/ONCOLOGY | Facility: CLINIC | Age: 67
End: 2022-06-08
Payer: MEDICARE

## 2022-06-08 NOTE — TELEPHONE ENCOUNTER
"----- Message from Rowdy Gay sent at 6/8/2022  2:35 PM CDT -----  Consult/Advisory:          Name Of Caller: Nisreen (Daughter)      Contact Preference?: [phone]        Provider Name: Lorin      Does patient feel the need to be seen today? No      What is the nature of the call?: Returning call to office.          Additional Notes:  "Thank you for all that you do for our patients"       "

## 2022-06-08 NOTE — TELEPHONE ENCOUNTER
Spoke with patient's daughter, Nisreen.  Reports that patient had labs 4 days ago and her blood count is dropping again.  She received a transfusion while in the hospital recently.  Daughter asks if she may benefit from IV iron.  Patient complaining of being cold all the time and has low energy.  Would like Dr. Padgett to review recent labs and advise.  If infusion is needed she would prefer to go to [facility] as she is still recovering from back surgery.

## 2022-06-09 DIAGNOSIS — D50.9 IRON DEFICIENCY ANEMIA, UNSPECIFIED IRON DEFICIENCY ANEMIA TYPE: Primary | ICD-10-CM

## 2022-06-13 NOTE — TELEPHONE ENCOUNTER
Patient arrived by [facility] for overdose of medications. Patient reported to be depressed and took approximate quantities of 90 Hydroxyzine 25 mg, 15 Trazodone 150 mg, 15 Vyvanse 70 mg one hour prior to ED arrival (12:45). No vomiting. Patient presents awake to voice, follows instructions and answers questions appropriately and is oriented x4. EMS place PIV and administered 500 ml NS bolus. PTA. Mother arrived ED ahead of patient and is at bedside.     Triage Assessment     Row Name 06/13/22 1359       Triage Assessment (Pediatric)    Airway WDL WDL       Respiratory WDL    Respiratory WDL WDL       Skin Circulation/Temperature WDL    Skin Circulation/Temperature WDL WDL       Cardiac WDL    Cardiac WDL WDL       Peripheral/Neurovascular WDL    Peripheral Neurovascular WDL WDL       Cognitive/Neuro/Behavioral WDL    Cognitive/Neuro/Behavioral WDL X;arousability    Arousal Level arouses to voice    Row Name 06/13/22 1348       Triage Assessment (Pediatric)    Airway WDL WDL               Staff contacted and spoke to patient daughter Nisreen, she would like to transfer patient to [facility] because it is closer to their home and they only have one car at this time.     Ms. Hairston was scheduled in [place].

## 2022-06-14 ENCOUNTER — TELEPHONE (OUTPATIENT)
Dept: HEMATOLOGY/ONCOLOGY | Facility: CLINIC | Age: 67
End: 2022-06-14
Payer: MEDICARE

## 2022-06-17 ENCOUNTER — PATIENT MESSAGE (OUTPATIENT)
Dept: HEMATOLOGY/ONCOLOGY | Facility: CLINIC | Age: 67
End: 2022-06-17
Payer: MEDICARE

## 2022-06-17 ENCOUNTER — LAB VISIT (OUTPATIENT)
Dept: LAB | Facility: HOSPITAL | Age: 67
End: 2022-06-17
Attending: INTERNAL MEDICINE
Payer: MEDICARE

## 2022-06-17 DIAGNOSIS — D50.9 IRON DEFICIENCY ANEMIA, UNSPECIFIED IRON DEFICIENCY ANEMIA TYPE: ICD-10-CM

## 2022-06-17 LAB
FERRITIN SERPL-MCNC: 699 NG/ML (ref 20–300)
IRON SERPL-MCNC: 62 UG/DL (ref 30–160)
SATURATED IRON: 26 % (ref 20–50)
TOTAL IRON BINDING CAPACITY: 234 UG/DL (ref 250–450)
TRANSFERRIN SERPL-MCNC: 158 MG/DL (ref 200–375)

## 2022-06-17 PROCEDURE — 82728 ASSAY OF FERRITIN: CPT | Performed by: INTERNAL MEDICINE

## 2022-06-17 PROCEDURE — 36415 COLL VENOUS BLD VENIPUNCTURE: CPT | Mod: PO | Performed by: INTERNAL MEDICINE

## 2022-06-17 PROCEDURE — 84466 ASSAY OF TRANSFERRIN: CPT | Mod: PO | Performed by: INTERNAL MEDICINE

## 2022-06-20 NOTE — TELEPHONE ENCOUNTER
No. She has other medical issues and the ferritin is high as a reaction to that. She is not low on iron and would not benefit from iron infusions at this time. She needs to see us in clinic for a full assessment. If she is unable to come to [facility], we can set her up with Dr. Barnes in [place] or [place]. Thanks

## 2022-06-22 ENCOUNTER — TELEPHONE (OUTPATIENT)
Dept: HEMATOLOGY/ONCOLOGY | Facility: CLINIC | Age: 67
End: 2022-06-22
Payer: MEDICARE

## 2022-06-22 NOTE — TELEPHONE ENCOUNTER
----- Message from Sharon Dueñas sent at 6/22/2022  9:15 AM CDT -----  Regarding: Reschedule Existing Appointment    Appt Date:  06/29/22    Type of appt :   ep    Physician:      Reason for rescheduling?   Can't get off from work.  Would like to take the 07/13 afternoon appt that was offered if it is still available.    Caller:   Nisreen (daughter)    Contact Preference:  [phone] --  daughter would like a call back to discuss

## 2022-06-23 ENCOUNTER — HOSPITAL ENCOUNTER (OUTPATIENT)
Dept: RADIOLOGY | Facility: HOSPITAL | Age: 67
Discharge: HOME | End: 2022-06-23
Attending: NURSE PRACTITIONER
Payer: MEDICARE

## 2022-06-23 DIAGNOSIS — I63.89 OTHER CEREBRAL INFARCTION: ICD-10-CM

## 2022-06-23 PROCEDURE — 70544 MR ANGIOGRAPHY HEAD W/O DYE: CPT | Mod: TC,59

## 2022-06-23 PROCEDURE — 70551 MRI BRAIN STEM W/O DYE: CPT | Mod: TC

## 2022-06-30 ENCOUNTER — TELEPHONE (OUTPATIENT)
Dept: NEUROLOGY | Facility: CLINIC | Age: 67
End: 2022-06-30
Payer: MEDICARE

## 2022-06-30 DIAGNOSIS — I63.89 OTHER CEREBRAL INFARCTION: ICD-10-CM

## 2022-07-08 ENCOUNTER — HOSPITAL ENCOUNTER (OUTPATIENT)
Dept: RADIOLOGY | Facility: HOSPITAL | Age: 67
Discharge: HOME | End: 2022-07-08
Attending: NURSE PRACTITIONER
Payer: MEDICARE

## 2022-07-08 DIAGNOSIS — I63.89 OTHER CEREBRAL INFARCTION: ICD-10-CM

## 2022-07-08 PROCEDURE — 70551 MRI BRAIN STEM W/O DYE: CPT | Mod: TC,PO

## 2022-07-11 ENCOUNTER — PATIENT MESSAGE (OUTPATIENT)
Dept: HEMATOLOGY/ONCOLOGY | Facility: CLINIC | Age: 67
End: 2022-07-11
Payer: MEDICARE

## 2022-07-13 ENCOUNTER — LAB VISIT (OUTPATIENT)
Dept: LAB | Facility: HOSPITAL | Age: 67
End: 2022-07-13
Payer: MEDICARE

## 2022-07-13 ENCOUNTER — OFFICE VISIT (OUTPATIENT)
Dept: HEMATOLOGY/ONCOLOGY | Facility: CLINIC | Age: 67
End: 2022-07-13
Payer: MEDICARE

## 2022-07-13 VITALS
OXYGEN SATURATION: 100 % | RESPIRATION RATE: 16 BRPM | BODY MASS INDEX: 29.59 KG/M2 | WEIGHT: 173.31 LBS | HEART RATE: 97 BPM | DIASTOLIC BLOOD PRESSURE: 81 MMHG | SYSTOLIC BLOOD PRESSURE: 170 MMHG | HEIGHT: 64 IN

## 2022-07-13 DIAGNOSIS — D50.9 IRON DEFICIENCY ANEMIA, UNSPECIFIED IRON DEFICIENCY ANEMIA TYPE: ICD-10-CM

## 2022-07-13 DIAGNOSIS — D47.2 MGUS (MONOCLONAL GAMMOPATHY OF UNKNOWN SIGNIFICANCE): Primary | ICD-10-CM

## 2022-07-13 DIAGNOSIS — D47.2 MGUS (MONOCLONAL GAMMOPATHY OF UNKNOWN SIGNIFICANCE): ICD-10-CM

## 2022-07-13 LAB
BASOPHILS # BLD AUTO: 0.01 K/UL (ref 0–0.2)
BASOPHILS NFR BLD: 0.1 % (ref 0–1.9)
DIFFERENTIAL METHOD: ABNORMAL
EOSINOPHIL # BLD AUTO: 0.1 K/UL (ref 0–0.5)
EOSINOPHIL NFR BLD: 0.9 % (ref 0–8)
ERYTHROCYTE [DISTWIDTH] IN BLOOD BY AUTOMATED COUNT: 14.6 % (ref 11.5–14.5)
FERRITIN SERPL-MCNC: 787 NG/ML (ref 20–300)
HCT VFR BLD AUTO: 25.2 % (ref 37–48.5)
HGB BLD-MCNC: 8 G/DL (ref 12–16)
IGA SERPL-MCNC: 197 MG/DL (ref 40–350)
IGG SERPL-MCNC: 1831 MG/DL (ref 650–1600)
IGM SERPL-MCNC: 59 MG/DL (ref 50–300)
IMM GRANULOCYTES # BLD AUTO: 0.03 K/UL (ref 0–0.04)
IMM GRANULOCYTES NFR BLD AUTO: 0.4 % (ref 0–0.5)
IRON SERPL-MCNC: 62 UG/DL (ref 30–160)
LYMPHOCYTES # BLD AUTO: 2.5 K/UL (ref 1–4.8)
LYMPHOCYTES NFR BLD: 31.9 % (ref 18–48)
MCH RBC QN AUTO: 28 PG (ref 27–31)
MCHC RBC AUTO-ENTMCNC: 31.7 G/DL (ref 32–36)
MCV RBC AUTO: 88 FL (ref 82–98)
MONOCYTES # BLD AUTO: 0.3 K/UL (ref 0.3–1)
MONOCYTES NFR BLD: 3.6 % (ref 4–15)
NEUTROPHILS # BLD AUTO: 5 K/UL (ref 1.8–7.7)
NEUTROPHILS NFR BLD: 63.1 % (ref 38–73)
NRBC BLD-RTO: 0 /100 WBC
PLATELET # BLD AUTO: 165 K/UL (ref 150–450)
PMV BLD AUTO: 11.5 FL (ref 9.2–12.9)
RBC # BLD AUTO: 2.86 M/UL (ref 4–5.4)
SATURATED IRON: 24 % (ref 20–50)
TOTAL IRON BINDING CAPACITY: 255 UG/DL (ref 250–450)
TRANSFERRIN SERPL-MCNC: 172 MG/DL (ref 200–375)
WBC # BLD AUTO: 7.97 K/UL (ref 3.9–12.7)

## 2022-07-13 PROCEDURE — 84165 PROTEIN E-PHORESIS SERUM: CPT | Performed by: NURSE PRACTITIONER

## 2022-07-13 PROCEDURE — 3288F PR FALLS RISK ASSESSMENT DOCUMENTED: ICD-10-PCS | Mod: CPTII,S$GLB,, | Performed by: NURSE PRACTITIONER

## 2022-07-13 PROCEDURE — 1126F AMNT PAIN NOTED NONE PRSNT: CPT | Mod: CPTII,S$GLB,, | Performed by: NURSE PRACTITIONER

## 2022-07-13 PROCEDURE — 84165 PATHOLOGIST INTERPRETATION SPE: ICD-10-PCS | Mod: 26,,, | Performed by: PATHOLOGY

## 2022-07-13 PROCEDURE — 1160F RVW MEDS BY RX/DR IN RCRD: CPT | Mod: CPTII,S$GLB,, | Performed by: NURSE PRACTITIONER

## 2022-07-13 PROCEDURE — 1159F PR MEDICATION LIST DOCUMENTED IN MEDICAL RECORD: ICD-10-PCS | Mod: CPTII,S$GLB,, | Performed by: NURSE PRACTITIONER

## 2022-07-13 PROCEDURE — 1160F PR REVIEW ALL MEDS BY PRESCRIBER/CLIN PHARMACIST DOCUMENTED: ICD-10-PCS | Mod: CPTII,S$GLB,, | Performed by: NURSE PRACTITIONER

## 2022-07-13 PROCEDURE — 84466 ASSAY OF TRANSFERRIN: CPT | Performed by: NURSE PRACTITIONER

## 2022-07-13 PROCEDURE — 99214 PR OFFICE/OUTPT VISIT, EST, LEVL IV, 30-39 MIN: ICD-10-PCS | Mod: S$GLB,,, | Performed by: NURSE PRACTITIONER

## 2022-07-13 PROCEDURE — 82728 ASSAY OF FERRITIN: CPT | Performed by: NURSE PRACTITIONER

## 2022-07-13 PROCEDURE — 3079F DIAST BP 80-89 MM HG: CPT | Mod: CPTII,S$GLB,, | Performed by: NURSE PRACTITIONER

## 2022-07-13 PROCEDURE — 99999 PR PBB SHADOW E&M-EST. PATIENT-LVL IV: ICD-10-PCS | Mod: PBBFAC,,, | Performed by: NURSE PRACTITIONER

## 2022-07-13 PROCEDURE — 3288F FALL RISK ASSESSMENT DOCD: CPT | Mod: CPTII,S$GLB,, | Performed by: NURSE PRACTITIONER

## 2022-07-13 PROCEDURE — 1159F MED LIST DOCD IN RCRD: CPT | Mod: CPTII,S$GLB,, | Performed by: NURSE PRACTITIONER

## 2022-07-13 PROCEDURE — 3079F PR MOST RECENT DIASTOLIC BLOOD PRESSURE 80-89 MM HG: ICD-10-PCS | Mod: CPTII,S$GLB,, | Performed by: NURSE PRACTITIONER

## 2022-07-13 PROCEDURE — 99214 OFFICE O/P EST MOD 30 MIN: CPT | Mod: S$GLB,,, | Performed by: NURSE PRACTITIONER

## 2022-07-13 PROCEDURE — 3077F SYST BP >= 140 MM HG: CPT | Mod: CPTII,S$GLB,, | Performed by: NURSE PRACTITIONER

## 2022-07-13 PROCEDURE — 3008F PR BODY MASS INDEX (BMI) DOCUMENTED: ICD-10-PCS | Mod: CPTII,S$GLB,, | Performed by: NURSE PRACTITIONER

## 2022-07-13 PROCEDURE — 84165 PROTEIN E-PHORESIS SERUM: CPT | Mod: 26,,, | Performed by: PATHOLOGY

## 2022-07-13 PROCEDURE — 3077F PR MOST RECENT SYSTOLIC BLOOD PRESSURE >= 140 MM HG: ICD-10-PCS | Mod: CPTII,S$GLB,, | Performed by: NURSE PRACTITIONER

## 2022-07-13 PROCEDURE — 4010F PR ACE/ARB THEARPY RXD/TAKEN: ICD-10-PCS | Mod: CPTII,S$GLB,, | Performed by: NURSE PRACTITIONER

## 2022-07-13 PROCEDURE — 3008F BODY MASS INDEX DOCD: CPT | Mod: CPTII,S$GLB,, | Performed by: NURSE PRACTITIONER

## 2022-07-13 PROCEDURE — 86334 IMMUNOFIX E-PHORESIS SERUM: CPT | Performed by: NURSE PRACTITIONER

## 2022-07-13 PROCEDURE — 1101F PR PT FALLS ASSESS DOC 0-1 FALLS W/OUT INJ PAST YR: ICD-10-PCS | Mod: CPTII,S$GLB,, | Performed by: NURSE PRACTITIONER

## 2022-07-13 PROCEDURE — 86334 PATHOLOGIST INTERPRETATION IFE: ICD-10-PCS | Mod: 26,,, | Performed by: PATHOLOGY

## 2022-07-13 PROCEDURE — 85025 COMPLETE CBC W/AUTO DIFF WBC: CPT | Performed by: NURSE PRACTITIONER

## 2022-07-13 PROCEDURE — 1126F PR PAIN SEVERITY QUANTIFIED, NO PAIN PRESENT: ICD-10-PCS | Mod: CPTII,S$GLB,, | Performed by: NURSE PRACTITIONER

## 2022-07-13 PROCEDURE — 83520 IMMUNOASSAY QUANT NOS NONAB: CPT | Mod: 59 | Performed by: NURSE PRACTITIONER

## 2022-07-13 PROCEDURE — 99999 PR PBB SHADOW E&M-EST. PATIENT-LVL IV: CPT | Mod: PBBFAC,,, | Performed by: NURSE PRACTITIONER

## 2022-07-13 PROCEDURE — 82784 ASSAY IGA/IGD/IGG/IGM EACH: CPT | Mod: 59 | Performed by: NURSE PRACTITIONER

## 2022-07-13 PROCEDURE — 36415 COLL VENOUS BLD VENIPUNCTURE: CPT | Performed by: NURSE PRACTITIONER

## 2022-07-13 PROCEDURE — 1101F PT FALLS ASSESS-DOCD LE1/YR: CPT | Mod: CPTII,S$GLB,, | Performed by: NURSE PRACTITIONER

## 2022-07-13 PROCEDURE — 86334 IMMUNOFIX E-PHORESIS SERUM: CPT | Mod: 26,,, | Performed by: PATHOLOGY

## 2022-07-13 PROCEDURE — 4010F ACE/ARB THERAPY RXD/TAKEN: CPT | Mod: CPTII,S$GLB,, | Performed by: NURSE PRACTITIONER

## 2022-07-13 NOTE — PROGRESS NOTES
PROGRESS NOTE    Subjective:       Patient ID: Soila Hairston is a 67 y.o. female.  MRN: [id number]  : 1955    Chief Complaint: Anemia     History of Present Illness:   Soila Hairston is a 67 y.o. female who presents with chronic anemia. She has a history of right hemiparesis, on anticoagulation and a history of colitis.     Interim history:  Patient of , seen today at BMT clinic due to scheduling issue. Patient will f/u with   Previously evaluated by  for anemia. Work up done, pending bone marrow. Work up showed IgG kappa Monoclonal band 0.73 g/dl()  Recent spinal fusion(). Received 2 units of PRBC during procedure.  She noticed cold intolerance at times. She has a stable right sided weakness following her CVA, stable appetite and PS  She denies any fever, chills or unintentional weight loss.   Upcoming MRI and neuro f/u.    Oncology History:  Oncology History    No history exists.       History:  Past Medical History:   Diagnosis Date    Anticoagulant long-term use     Colitis     Colon polyp     Depression     Diabetes mellitus, type 2     Diverticulosis of colon     Foot drop     a couple weeks ago    Hemorrhoids     Hyperlipidemia     Hypertension     SOB (shortness of breath)     Stroke 2015      Past Surgical History:   Procedure Laterality Date    ABSCESS DRAINAGE Right     breast    COLONOSCOPY N/A 10/18/2019    Procedure: COLONOSCOPY2 day golytely;  Surgeon: Adryan Schultz MD;  Location: [facility] ENDO;  Service: General;  Laterality: N/A;    EPIDURAL STEROID INJECTION      ESOPHAGOGASTRODUODENOSCOPY N/A 10/18/2019    Procedure: EGD (ESOPHAGOGASTRODUODENOSCOPY);  Surgeon: Adryan Schultz MD;  Location: [facility] ENDO;  Service: General;  Laterality: N/A;    TRANSFORAMINAL EPIDURAL INJECTION OF STEROID Left 2019    Procedure: INJECTION, STEROID, EPIDURAL, TRANSFORAMINAL  "APPROACH [5907];  Surgeon: Ad Patel III, MD;  Location: [facility] OR;  Service: Pain Management;  Laterality: Left;  L5/S1     Family History   Problem Relation Age of Onset    Dementia Mother     Breast cancer Mother      Social History     Tobacco Use    Smoking status: Current Some Day Smoker     Packs/day: 0.25     Years: 40.00     Pack years: 10.00     Types: Cigarettes    Smokeless tobacco: Never Used    Tobacco comment: smoke 5 cigs a day   Substance and Sexual Activity    Alcohol use: No     Alcohol/week: 0.0 standard drinks    Drug use: No    Sexual activity: Not Currently        ROS:   Review of Systems   Constitutional: Negative for fever, malaise/fatigue and weight loss.   Respiratory: Negative for cough and shortness of breath.    Cardiovascular: Negative for chest pain and leg swelling.   Gastrointestinal: Negative for constipation, diarrhea, nausea and vomiting.   Skin: Negative for rash.   Neurological: Negative for seizures and weakness.   Psychiatric/Behavioral: The patient is not nervous/anxious.         Objective:     Vitals:    07/13/22 1129   BP: (!) 170/81   Pulse: 97   Resp: 16   SpO2: 100%   Weight: 78.6 kg (173 lb 4.5 oz)   Height: 5' 4" (1.626 m)   PainSc: 0-No pain     Wt Readings from Last 10 Encounters:   07/13/22 78.6 kg (173 lb 4.5 oz)   06/04/22 77.1 kg (170 lb)   01/04/22 82.4 kg (181 lb 10.5 oz)   12/22/21 83 kg (182 lb 15.7 oz)   07/14/21 84.1 kg (185 lb 8.3 oz)   01/13/20 82.6 kg (182 lb)   10/18/19 83.9 kg (185 lb)   05/27/19 84.4 kg (186 lb)   02/20/19 84.7 kg (186 lb 11.7 oz)   02/06/19 77.1 kg (170 lb)       Physical Examination:   Physical Exam  HENT:      Head: Normocephalic.      Mouth/Throat:      Mouth: Mucous membranes are moist.   Eyes:      Pupils: Pupils are equal, round, and reactive to light.   Cardiovascular:      Rate and Rhythm: Normal rate.      Comments: Elevated BP  Pulmonary:      Effort: Pulmonary effort is normal.      Breath sounds: " Normal breath sounds.   Abdominal:      General: Bowel sounds are normal.   Musculoskeletal:      Cervical back: Normal range of motion.      Comments: Right sided weakness   Skin:     General: Skin is warm and dry.   Neurological:      Mental Status: She is alert and oriented to person, place, and time.   Psychiatric:         Mood and Affect: Mood normal.         Behavior: Behavior normal.          Diagnostic Tests:  Significant Imaging: I have reviewed and interpreted all pertinent imaging results/findings.  CT Chest Without Contrast No results found for this or any previous visit.    CT Chest With ContrastNo results found for this or any previous visit.    CT Abdomen/Pelvis Without Contrast No results found for this or any previous visit.     CT Abdomen/Pelvis With Contrast No results found for this or any previous visit.    CT Abdomen/Pelvis With Without Contrast No results found for this or any previous visit.     PET Scan No results found for this or any previous visit.      Laboratory Data:  All pertinent labs have been reviewed.  Peripheral smear:  PATHOLOGIST INTERPRETATION   RBC- Normocytic anemia without significant morphologic abnormalities.   Rare schistocyte are noted, but no overt features of hemolysis.   WBC- Normal morphology, normal differential. No dysplastic cells or   circulating blasts are present.   PLT- Normal in number and morphology.   Interpreted by Linda Rojas M.D.     Myeloma panel:  Quantitative IG normal:   K/L ratio 2.49 elevated   IgG kappa Monoclonal band 0.73g/dl    Labs:   Lab Results   Component Value Date    WBC 7.97 07/13/2022    RBC 2.86 (L) 07/13/2022    HGB 8.0 (L) 07/13/2022    HCT 25.2 (L) 07/13/2022    MCV 88 07/13/2022     07/13/2022     (H) 06/04/2022     06/04/2022    K 4.8 06/04/2022    BUN 24 (H) 06/04/2022    CREATININE 1.24 06/04/2022    AST 28 06/04/2022    ALT 16 06/04/2022    BILITOT 0.4 06/04/2022       Assessment/Plan:   MGUS  (monoclonal gammopathy of unknown significance)    Work up is reviewed. She has normocytic anemia in the 9 g/dl range, without evidence for hemolysis or nutritional deficiencies.  She does have IgG kappa monoclonal gammopathy and abnormal K/L ration, anemia and renal insufficiency, needs work up to rule out multiple myeloma.   I will request a 24 hour urine DEVI,  bone marrow biopsy and skeletal survey and see her in clinic once the work up is completed. Will repeat MM studies today. Patient not done requested tests including 24 hr urine, skeletal survey, bone bx- will schedule it ASAP   Repeated MM studies today, will f/u results next week  Coordinate bone survey, bone bx -patient from long distance -     Anemia due to other cause, not classified  As above.      ECOG SCORE            BMT Chart Routing  Urgent    Follow up with physician    Follow up with [facility] . Virtual visit with me next week to review result.   Infusion scheduling note    Injection scheduling note    Labs    Imaging    Pharmacy appointment    Other referrals            Kelsea Garland NP  Hematology/Oncology/BMT

## 2022-07-14 LAB
ALBUMIN SERPL ELPH-MCNC: 3.21 G/DL (ref 3.35–5.55)
ALPHA1 GLOB SERPL ELPH-MCNC: 0.35 G/DL (ref 0.17–0.41)
ALPHA2 GLOB SERPL ELPH-MCNC: 0.63 G/DL (ref 0.43–0.99)
B-GLOBULIN SERPL ELPH-MCNC: 0.79 G/DL (ref 0.5–1.1)
GAMMA GLOB SERPL ELPH-MCNC: 1.61 G/DL (ref 0.67–1.58)
INTERPRETATION SERPL IFE-IMP: NORMAL
KAPPA LC SER QL IA: 14.59 MG/DL (ref 0.33–1.94)
KAPPA LC/LAMBDA SER IA: 3.59 (ref 0.26–1.65)
LAMBDA LC SER QL IA: 4.06 MG/DL (ref 0.57–2.63)
PROT SERPL-MCNC: 6.6 G/DL (ref 6–8.4)

## 2022-07-15 LAB
PATHOLOGIST INTERPRETATION IFE: NORMAL
PATHOLOGIST INTERPRETATION SPE: NORMAL

## 2022-07-18 ENCOUNTER — TELEPHONE (OUTPATIENT)
Dept: HEMATOLOGY/ONCOLOGY | Facility: CLINIC | Age: 67
End: 2022-07-18
Payer: MEDICARE

## 2022-07-18 NOTE — TELEPHONE ENCOUNTER
Called patient back and reconfirmed virtual appt on 7/20. Patient verbalized understanding and thanked nurse

## 2022-07-18 NOTE — TELEPHONE ENCOUNTER
Spoke to patient's daughter. Canceled appointment with Dr. Padgett, kept virtual visit with Kelsea.

## 2022-07-18 NOTE — TELEPHONE ENCOUNTER
----- Message from Daniele West sent at 7/18/2022  9:11 AM CDT -----  Type: Patient Returning Call      Who Called: Pt's Daughter   Who Left Message for Patient: NA    Does the patient know what this is regarding?: Appointment on 07/20 at 8:30AM, needs clarification. Patient is having an in office visit with Dr. Padgett the same day and they want to clarify if the appointment with Dr. Garland is accurate.       Would the patient rather a call back or a response via [facility]? Call  Best Call Back Number: [phone]  Additional Information: Please assist, thank you!

## 2022-07-18 NOTE — TELEPHONE ENCOUNTER
----- Message from Daniele West sent at 7/18/2022  9:09 AM CDT -----       Type: Patient Returning Call    Who Called: Pt's Daughter   Who Left Message for Patient: NA  Does the patient know what this is regarding?: Appointment on 07/20 at 2:30PM, needs clarification. Patient is having a virtual visit with Dr. Garland the same day and they want to clarify if the appointment with Dr. Padgett is correct.   Would the patient rather a call back or a response via [facility]? Call  Best Call Back Number: [phone]  Additional Information: Please assist, thank you!

## 2022-07-20 ENCOUNTER — OFFICE VISIT (OUTPATIENT)
Dept: HEMATOLOGY/ONCOLOGY | Facility: CLINIC | Age: 67
End: 2022-07-20
Payer: MEDICARE

## 2022-07-20 ENCOUNTER — TELEPHONE (OUTPATIENT)
Dept: HEMATOLOGY/ONCOLOGY | Facility: CLINIC | Age: 67
End: 2022-07-20
Payer: MEDICARE

## 2022-07-20 ENCOUNTER — OFFICE VISIT (OUTPATIENT)
Dept: NEUROLOGY | Facility: CLINIC | Age: 67
End: 2022-07-20
Payer: MEDICARE

## 2022-07-20 ENCOUNTER — LAB VISIT (OUTPATIENT)
Dept: LAB | Facility: HOSPITAL | Age: 67
End: 2022-07-20
Payer: MEDICARE

## 2022-07-20 VITALS
HEART RATE: 92 BPM | HEIGHT: 64 IN | BODY MASS INDEX: 29.74 KG/M2 | DIASTOLIC BLOOD PRESSURE: 79 MMHG | SYSTOLIC BLOOD PRESSURE: 156 MMHG

## 2022-07-20 DIAGNOSIS — D47.2 MGUS (MONOCLONAL GAMMOPATHY OF UNKNOWN SIGNIFICANCE): ICD-10-CM

## 2022-07-20 DIAGNOSIS — D63.0 ANEMIA IN NEOPLASTIC DISEASE: ICD-10-CM

## 2022-07-20 DIAGNOSIS — E78.2 MIXED HYPERLIPIDEMIA: ICD-10-CM

## 2022-07-20 DIAGNOSIS — Z86.73 HISTORY OF STROKE: ICD-10-CM

## 2022-07-20 DIAGNOSIS — I10 ESSENTIAL HYPERTENSION: ICD-10-CM

## 2022-07-20 DIAGNOSIS — D47.2 MGUS (MONOCLONAL GAMMOPATHY OF UNKNOWN SIGNIFICANCE): Primary | ICD-10-CM

## 2022-07-20 DIAGNOSIS — Z01.812 PRE-PROCEDURE LAB EXAM: ICD-10-CM

## 2022-07-20 DIAGNOSIS — Z01.812 PRE-PROCEDURE LAB EXAM: Primary | ICD-10-CM

## 2022-07-20 DIAGNOSIS — I63.89 OTHER CEREBRAL INFARCTION: ICD-10-CM

## 2022-07-20 DIAGNOSIS — E08.00 DIABETES MELLITUS DUE TO UNDERLYING CONDITION WITH HYPEROSMOLARITY WITHOUT COMA, WITH LONG-TERM CURRENT USE OF INSULIN: ICD-10-CM

## 2022-07-20 DIAGNOSIS — Z79.4 DIABETES MELLITUS DUE TO UNDERLYING CONDITION WITH HYPEROSMOLARITY WITHOUT COMA, WITH LONG-TERM CURRENT USE OF INSULIN: ICD-10-CM

## 2022-07-20 LAB
CREAT SERPL-MCNC: 1.3 MG/DL (ref 0.5–1.4)
EST. GFR  (AFRICAN AMERICAN): 48.7 ML/MIN/1.73 M^2
EST. GFR  (NON AFRICAN AMERICAN): 42.3 ML/MIN/1.73 M^2

## 2022-07-20 PROCEDURE — 99215 PR OFFICE/OUTPT VISIT, EST, LEVL V, 40-54 MIN: ICD-10-PCS | Mod: FS,S$GLB,, | Performed by: NURSE PRACTITIONER

## 2022-07-20 PROCEDURE — 3008F PR BODY MASS INDEX (BMI) DOCUMENTED: ICD-10-PCS | Mod: CPTII,S$GLB,, | Performed by: NURSE PRACTITIONER

## 2022-07-20 PROCEDURE — 99999 PR PBB SHADOW E&M-EST. PATIENT-LVL IV: ICD-10-PCS | Mod: PBBFAC,,, | Performed by: NURSE PRACTITIONER

## 2022-07-20 PROCEDURE — 36415 COLL VENOUS BLD VENIPUNCTURE: CPT | Performed by: NURSE PRACTITIONER

## 2022-07-20 PROCEDURE — 1101F PT FALLS ASSESS-DOCD LE1/YR: CPT | Mod: CPTII,S$GLB,, | Performed by: NURSE PRACTITIONER

## 2022-07-20 PROCEDURE — 1160F PR REVIEW ALL MEDS BY PRESCRIBER/CLIN PHARMACIST DOCUMENTED: ICD-10-PCS | Mod: CPTII,95,, | Performed by: NURSE PRACTITIONER

## 2022-07-20 PROCEDURE — 4010F ACE/ARB THERAPY RXD/TAKEN: CPT | Mod: CPTII,95,, | Performed by: NURSE PRACTITIONER

## 2022-07-20 PROCEDURE — 1159F MED LIST DOCD IN RCRD: CPT | Mod: CPTII,S$GLB,, | Performed by: NURSE PRACTITIONER

## 2022-07-20 PROCEDURE — 1160F RVW MEDS BY RX/DR IN RCRD: CPT | Mod: CPTII,S$GLB,, | Performed by: NURSE PRACTITIONER

## 2022-07-20 PROCEDURE — 99215 OFFICE O/P EST HI 40 MIN: CPT | Mod: FS,S$GLB,, | Performed by: NURSE PRACTITIONER

## 2022-07-20 PROCEDURE — 3288F FALL RISK ASSESSMENT DOCD: CPT | Mod: CPTII,S$GLB,, | Performed by: NURSE PRACTITIONER

## 2022-07-20 PROCEDURE — 3008F BODY MASS INDEX DOCD: CPT | Mod: CPTII,S$GLB,, | Performed by: NURSE PRACTITIONER

## 2022-07-20 PROCEDURE — 4010F PR ACE/ARB THEARPY RXD/TAKEN: ICD-10-PCS | Mod: CPTII,S$GLB,, | Performed by: NURSE PRACTITIONER

## 2022-07-20 PROCEDURE — 3078F PR MOST RECENT DIASTOLIC BLOOD PRESSURE < 80 MM HG: ICD-10-PCS | Mod: CPTII,S$GLB,, | Performed by: NURSE PRACTITIONER

## 2022-07-20 PROCEDURE — 3077F SYST BP >= 140 MM HG: CPT | Mod: CPTII,S$GLB,, | Performed by: NURSE PRACTITIONER

## 2022-07-20 PROCEDURE — 3077F PR MOST RECENT SYSTOLIC BLOOD PRESSURE >= 140 MM HG: ICD-10-PCS | Mod: CPTII,S$GLB,, | Performed by: NURSE PRACTITIONER

## 2022-07-20 PROCEDURE — 82565 ASSAY OF CREATININE: CPT | Performed by: NURSE PRACTITIONER

## 2022-07-20 PROCEDURE — 1159F PR MEDICATION LIST DOCUMENTED IN MEDICAL RECORD: ICD-10-PCS | Mod: CPTII,S$GLB,, | Performed by: NURSE PRACTITIONER

## 2022-07-20 PROCEDURE — 1160F RVW MEDS BY RX/DR IN RCRD: CPT | Mod: CPTII,95,, | Performed by: NURSE PRACTITIONER

## 2022-07-20 PROCEDURE — 99214 PR OFFICE/OUTPT VISIT, EST, LEVL IV, 30-39 MIN: ICD-10-PCS | Mod: 95,,, | Performed by: NURSE PRACTITIONER

## 2022-07-20 PROCEDURE — 1101F PR PT FALLS ASSESS DOC 0-1 FALLS W/OUT INJ PAST YR: ICD-10-PCS | Mod: CPTII,S$GLB,, | Performed by: NURSE PRACTITIONER

## 2022-07-20 PROCEDURE — 1126F AMNT PAIN NOTED NONE PRSNT: CPT | Mod: CPTII,S$GLB,, | Performed by: NURSE PRACTITIONER

## 2022-07-20 PROCEDURE — 1159F PR MEDICATION LIST DOCUMENTED IN MEDICAL RECORD: ICD-10-PCS | Mod: CPTII,95,, | Performed by: NURSE PRACTITIONER

## 2022-07-20 PROCEDURE — 3078F DIAST BP <80 MM HG: CPT | Mod: CPTII,S$GLB,, | Performed by: NURSE PRACTITIONER

## 2022-07-20 PROCEDURE — 1160F PR REVIEW ALL MEDS BY PRESCRIBER/CLIN PHARMACIST DOCUMENTED: ICD-10-PCS | Mod: CPTII,S$GLB,, | Performed by: NURSE PRACTITIONER

## 2022-07-20 PROCEDURE — 4010F ACE/ARB THERAPY RXD/TAKEN: CPT | Mod: CPTII,S$GLB,, | Performed by: NURSE PRACTITIONER

## 2022-07-20 PROCEDURE — 99214 OFFICE O/P EST MOD 30 MIN: CPT | Mod: 95,,, | Performed by: NURSE PRACTITIONER

## 2022-07-20 PROCEDURE — 1159F MED LIST DOCD IN RCRD: CPT | Mod: CPTII,95,, | Performed by: NURSE PRACTITIONER

## 2022-07-20 PROCEDURE — 99999 PR PBB SHADOW E&M-EST. PATIENT-LVL IV: CPT | Mod: PBBFAC,,, | Performed by: NURSE PRACTITIONER

## 2022-07-20 PROCEDURE — 3288F PR FALLS RISK ASSESSMENT DOCUMENTED: ICD-10-PCS | Mod: CPTII,S$GLB,, | Performed by: NURSE PRACTITIONER

## 2022-07-20 PROCEDURE — 4010F PR ACE/ARB THEARPY RXD/TAKEN: ICD-10-PCS | Mod: CPTII,95,, | Performed by: NURSE PRACTITIONER

## 2022-07-20 PROCEDURE — 1126F PR PAIN SEVERITY QUANTIFIED, NO PAIN PRESENT: ICD-10-PCS | Mod: CPTII,S$GLB,, | Performed by: NURSE PRACTITIONER

## 2022-07-20 NOTE — TELEPHONE ENCOUNTER
"----- Message from Negar Rodriguez sent at 7/20/2022  9:06 AM CDT -----  Name Of Caller: Nisreen- daughter    Contact Preference?:    What is the nature of the call?: requesting a call back to discuss results. States due to Dr Garland's accent the pt is not clear as to what the results are that were discussed in the virtual visit.            Additional Notes:  "Thank you for all that you do for our patients'"     "

## 2022-07-20 NOTE — PROGRESS NOTES
PROGRESS NOTE    Subjective:       Patient ID: Soila Hairston is a 67 y.o. female.  MRN: [id number]  : 1955    Chief Complaint: Anemia   The patient location is: Home  The chief complaint leading to consultation is: Paraproteinemia f/u    Visit type: audiovisual    Face to Face time with patient: 15 minute  20 minutes of total time spent on the encounter, which includes face to face time and non-face to face time preparing to see the patient (eg, review of tests), Obtaining and/or reviewing separately obtained history, Documenting clinical information in the electronic or other health record, Independently interpreting results (not separately reported) and communicating results to the patient/family/caregiver, or Care coordination (not separately reported).         Each patient to whom he or she provides medical services by telemedicine is:  (1) informed of the relationship between the physician and patient and the respective role of any other health care provider with respect to management of the patient; and (2) notified that he or she may decline to receive medical services by telemedicine and may withdraw from such care at any time.    Notes:   History of Present Illness:   Soila Hairston is a 67 y.o. female who presents with chronic anemia. Presents BMT clinic to evaluate paraproteinemia. She has a history of right hemiparesis, on anticoagulation and a history of colitis.     Interim history:  Patient of , seen at BMT clinic due to scheduling issue. Patient will f/u with   Previously evaluated by  for anemia. Work up done, pending bone marrow. Work up showed IgG kappa Monoclonal band 0.73 g/dl()  Recent spinal fusion(). Received 2 units of PRBC during procedure.  She noticed cold intolerance at times. She has a stable right sided weakness following her CVA, stable appetite and PS  She denies any  fever, chills or unintentional weight loss.   Upcoming MRI and neuro f/u.  Presents virtually today for follow up visit. Repeated Myeloma studies shows increased paraprotein. Urine studies pending. No acute events since last visit  Oncology History:  Oncology History    No history exists.       History:  Past Medical History:   Diagnosis Date    Anticoagulant long-term use     Colitis     Colon polyp     Depression     Diabetes mellitus, type 2     Diverticulosis of colon     Foot drop     a couple weeks ago    Hemorrhoids     Hyperlipidemia     Hypertension     SOB (shortness of breath)     Stroke 12/23/2015      Past Surgical History:   Procedure Laterality Date    ABSCESS DRAINAGE Right     breast    COLONOSCOPY N/A 10/18/2019    Procedure: COLONOSCOPY2 day golytely;  Surgeon: Adryan Schultz MD;  Location: [facility];  Service: General;  Laterality: N/A;    EPIDURAL STEROID INJECTION      ESOPHAGOGASTRODUODENOSCOPY N/A 10/18/2019    Procedure: EGD (ESOPHAGOGASTRODUODENOSCOPY);  Surgeon: Adryan Schultz MD;  Location: [facility];  Service: General;  Laterality: N/A;    TRANSFORAMINAL EPIDURAL INJECTION OF STEROID Left 2/20/2019    Procedure: INJECTION, STEROID, EPIDURAL, TRANSFORAMINAL APPROACH [3999];  Surgeon: Ad Patel III, MD;  Location: [facility] OR;  Service: Pain Management;  Laterality: Left;  L5/S1     Family History   Problem Relation Age of Onset    Dementia Mother     Breast cancer Mother      Social History     Tobacco Use    Smoking status: Current Some Day Smoker     Packs/day: 0.25     Years: 40.00     Pack years: 10.00     Types: Cigarettes    Smokeless tobacco: Never Used    Tobacco comment: smoke 5 cigs a day   Substance and Sexual Activity    Alcohol use: No     Alcohol/week: 0.0 standard drinks    Drug use: No    Sexual activity: Not Currently        ROS:   Review of Systems   Constitutional: Negative for fever, malaise/fatigue and weight loss.   Respiratory:  Negative for cough and shortness of breath.    Cardiovascular: Negative for chest pain and leg swelling.   Gastrointestinal: Negative for constipation, diarrhea, nausea and vomiting.   Skin: Negative for rash.   Neurological: Negative for seizures and weakness.   Psychiatric/Behavioral: The patient is not nervous/anxious.         Objective:       There were no vitals filed for this visit.  Wt Readings from Last 10 Encounters:   07/13/22 78.6 kg (173 lb 4.5 oz)   06/04/22 77.1 kg (170 lb)   01/04/22 82.4 kg (181 lb 10.5 oz)   12/22/21 83 kg (182 lb 15.7 oz)   07/14/21 84.1 kg (185 lb 8.3 oz)   01/13/20 82.6 kg (182 lb)   10/18/19 83.9 kg (185 lb)   05/27/19 84.4 kg (186 lb)   02/20/19 84.7 kg (186 lb 11.7 oz)   02/06/19 77.1 kg (170 lb)       Physical Examination:    Physical exam deferred due to nature of visit  Diagnostic Tests:  Significant Imaging: I have reviewed and interpreted all pertinent imaging results/findings.  CT Chest Without Contrast No results found for this or any previous visit.    CT Chest With ContrastNo results found for this or any previous visit.    CT Abdomen/Pelvis Without Contrast No results found for this or any previous visit.     CT Abdomen/Pelvis With Contrast No results found for this or any previous visit.    CT Abdomen/Pelvis With Without Contrast No results found for this or any previous visit.     PET Scan No results found for this or any previous visit.      Laboratory Data:  All pertinent labs have been reviewed.  Peripheral smear:  PATHOLOGIST INTERPRETATION   RBC- Normocytic anemia without significant morphologic abnormalities.   Rare schistocyte are noted, but no overt features of hemolysis.   WBC- Normal morphology, normal differential. No dysplastic cells or   circulating blasts are present.   PLT- Normal in number and morphology.   Interpreted by Linda Rojas M.D.     Myeloma panel:  Quantitative IG normal:   K/L ratio 2.49 elevated   IgG kappa Monoclonal band  0.73g/dl    Labs:   Lab Results   Component Value Date    WBC 7.97 07/13/2022    RBC 2.86 (L) 07/13/2022    HGB 8.0 (L) 07/13/2022    HCT 25.2 (L) 07/13/2022    MCV 88 07/13/2022     07/13/2022     (H) 06/04/2022     06/04/2022    K 4.8 06/04/2022    BUN 24 (H) 06/04/2022    CREATININE 1.3 07/20/2022    AST 28 06/04/2022    ALT 16 06/04/2022    BILITOT 0.4 06/04/2022       Assessment/Plan:   MGUS (monoclonal gammopathy of unknown significance)    Work up is reviewed. She has normocytic anemia in the 9 g/dl range, without evidence for hemolysis or nutritional deficiencies.  She does have IgG kappa monoclonal gammopathy and abnormal K/L ration, anemia and renal insufficiency, needs work up to rule out multiple myeloma.   I will request a 24 hour urine DEVI,  bone marrow biopsy and skeletal survey and see her in clinic once the work up is completed. Repeated mm studies shows increased paraprotein. Current (07/13/22) SPEP -  0.82 g/dL, previously 0.73 g/dL    Patient not done requested tests including 24 hr urine, skeletal survey, bone bx- will schedule it ASAP  Coordinate bone survey, bone bx -patient from long distance -     Anemia due to other cause, not classified  As above.      ECOG SCORE            BMT Chart Routing  Urgent    Follow up with physician    Follow up with [facility] . Week after bone bx   Infusion scheduling note    Injection scheduling note    Labs    Lab interval:  Bone biopsy  with next available day. Bone survey requested by . please coordinate appointments together.    Imaging    Order for 24 hour urine, Beta microglobulin on bx day    Pharmacy appointment    Other referrals            Kelsea Garland NP  Hematology/Oncology/BMT

## 2022-07-20 NOTE — PROGRESS NOTES
[facility] VISIT      SUBJECTIVE:    History for Present Illness: Soila Hairston is a 67 y.o.  female   has a past medical history of Colitis, Colon polyp, Depression, Diabetes mellitus, type 2, Diverticulosis of colon, Foot drop, Hemorrhoids, Hyperlipidemia, Hypertension, SOB (shortness of breath),  Stroke and lumbosacral radiculopathy who presents to me to clinic today for follow-up.  She was last seen in clinic by me on 07/14/2021 for intermittent headache s/p stroke.  She was recently seen at [facility] on 06/04/2022 for dysarthria.  She is accompanied to today's visit by her daughter and significant other.  Patient underwent MRI/MRV of the brain on 06/23/2022 which indicated no acute infarcts.  Marked decreased flow signal within the right transverse sinus.  Patient was scheduled to undergo MRI brain with and without further evaluation of decreased flow signal within the right transverse sinus however facility was unable to obtain IV access. At the time of today's visit, the patient denies new or worsening focal neurologic symptoms concerning for new stroke or TIA.  Patient remains independent with ADLs.  Positive tobacco dependency.  Denies alcohol/illicit drug use.  Patient is compliant with her medications.      Past Medical History:   Diagnosis Date    Anticoagulant long-term use     Colitis     Colon polyp     Depression     Diabetes mellitus, type 2     Diverticulosis of colon     Foot drop     a couple weeks ago    Hemorrhoids     Hyperlipidemia     Hypertension     SOB (shortness of breath)     Stroke 12/23/2015     Past Surgical History:   Procedure Laterality Date    ABSCESS DRAINAGE Right     breast    COLONOSCOPY N/A 10/18/2019    Procedure: COLONOSCOPY2 day robel;  Surgeon: Adryan Schultz MD;  Location: [facility];  Service: General;  Laterality: N/A;    EPIDURAL STEROID INJECTION      ESOPHAGOGASTRODUODENOSCOPY N/A  "10/18/2019    Procedure: EGD (ESOPHAGOGASTRODUODENOSCOPY);  Surgeon: Adryan Schultz MD;  Location: [facility] ENDO;  Service: General;  Laterality: N/A;    TRANSFORAMINAL EPIDURAL INJECTION OF STEROID Left 2/20/2019    Procedure: INJECTION, STEROID, EPIDURAL, TRANSFORAMINAL APPROACH [3999];  Surgeon: Ad Patel III, MD;  Location: [facility] OR;  Service: Pain Management;  Laterality: Left;  L5/S1     Family History   Problem Relation Age of Onset    Dementia Mother     Breast cancer Mother         Current Outpatient Medications:     alprazolam (XANAX) 2 MG Tab, Take 1 tablet (2 mg total) by mouth 2 (two) times daily as needed. Take 1/2 tablet by mouth two times daily as needed., Disp: 28 tablet, Rfl: 0    atorvastatin (LIPITOR) 10 MG tablet, Take 4 tablets (40 mg total) by mouth once daily., Disp: 30 tablet, Rfl: 0    BD INSULIN PEN NEEDLE UF MINI 31 gauge x 3/16" Ndle, , Disp: , Rfl:     BEVESPI AEROSPHERE 9-4.8 mcg HFAA, , Disp: , Rfl:     clopidogreL (PLAVIX) 75 mg tablet, Take 1 tablet (75 mg total) by mouth once daily., Disp: 30 tablet, Rfl: 0    ergocalciferol (ERGOCALCIFEROL) 50,000 unit Cap, Take 50,000 Units by mouth every 7 days., Disp: , Rfl:     ferrous gluconate (FERGON) 324 MG tablet, Take 324 mg by mouth daily with breakfast., Disp: , Rfl:     glimepiride (AMARYL) 2 MG tablet, glimepiride 2 mg tablet bid, Disp: , Rfl:     hydrochlorothiazide (HYDRODIURIL) 25 MG tablet, Take 1 tablet (25 mg total) by mouth once daily., Disp: 30 tablet, Rfl: 3    LANTUS SOLOSTAR 100 unit/mL (3 mL) InPn pen, Inject 40 Units into the skin once daily., Disp: 1 Box, Rfl: 3    levocetirizine (XYZAL) 5 MG tablet, Take 5 mg by mouth every evening., Disp: , Rfl:     losartan (COZAAR) 50 MG tablet, Take 1 tablet (50 mg total) by mouth once daily., Disp: 30 tablet, Rfl: 3    metoprolol tartrate (LOPRESSOR) 50 MG tablet, Take 50 mg by mouth once daily., Disp: , Rfl:     mirabegron (MYRBETRIQ) 50 mg Tb24, Take 1 " "tablet by mouth once daily., Disp: , Rfl:     oxyCODONE-acetaminophen (PERCOCET)  mg per tablet, Take 1 tablet by mouth every 4 (four) hours as needed for Pain., Disp: 18 tablet, Rfl: 0    pantoprazole (PROTONIX) 40 MG tablet, TAKE 1 TABLET EVERY DAY, Disp: 90 tablet, Rfl: 3    TRUETEST TEST STRIPS Strp, , Disp: , Rfl:     baclofen (LIORESAL) 10 MG tablet, Take 1 tablet (10 mg total) by mouth 3 (three) times daily. (Patient not taking: Reported on 7/20/2022), Disp: 90 tablet, Rfl: 3    NON FORMULARY MEDICATION, Diclofenac/lido/prilocaine 0.15/2.25/2.25% apply 3.2 grams to painful area up to five times a day, Disp: , Rfl:     polyethylene glycol (GOLYTELY,NULYTELY) 236-22.74-6.74 -5.86 gram suspension, Take 4,000 mLs (4 L total) by mouth As instructed., Disp: 4000 mL, Rfl: 0    umeclidinium brm/vilanterol tr (ANORO ELLIPTA INHL), Inhale into the lungs once daily., Disp: , Rfl:      Review of Systems:  Constitution: negative for lethargy and weakness; no recent changes in weight  Eyes: no eyesight worsening; no double vision; no eye pain  ENT/Mouth: no nasal congestion or nose bleeds; no sore throat or hoarseness  Cardiovascular:  no chest pain with exertion; no palpitations  Respiratory: Normal effort and rate; no coughing  Gastrointestinal: No N/V; negative abdominal pain; no changes in bowel habits  Genitourinary:  no increased frequency in voiding or incontinence  Musculoskeletal:  No joint pain or swelling; no myalgia ;RSW  Skin:  negative for skin rash or lesions  Hematologic: negative for bruising  Neurologic: negative headache, dizziness, or confusion    OBJECTIVE:    BP (!) 156/79   Pulse 92   Ht 5' 4" (1.626 m)   LMP  (LMP Unknown)   BMI 29.74 kg/m²     Physical Exam   Constitution: She appears well nourished. No distress   LOC: Alert and follows request  Head: Normocephalic and atraumatic.   Cardiovascular: Normal rate. Intact distal pulses  Pulmonary/Chest: Effort normal. No respiratory " distress  Musculoskeletal:  right hemiplegia;  Psychiatric: no pressured speech; normal affect; no evidence of impaired cognition    Neurologic Exam: (patient with Hx of stroke in  2015 , per patient ; positive neuro findings have been present since stroke without increase in severity)  Orientation: person, place and time  Language: No aphasia  Speech: positive dysarthria  Memory: Recent memory intact; Remote memory intact; age correct; month correct  Visual Fields (CN II):  Full  EOM (CN III, IV, VI): Full intact  Pupils (CN II, III): PERRL  Facial Sensation (CN V): symmetric  Facial Movement (CN VII): right lower facial palsy  Hearing (CN VIII):  Intact bilaterally   Shoulder/Neck (CN XI): SCM-Left: Normal; SCM-Right: Normal; Left Shoulder Shrug: Normal ; Right Shoulder Shrug: decreased  Tongue (CN XII): to midline  Reflexes: flexor plantar responses bilaterally and 2+ throughout  Motor: Arm Left:  Normal (5/5), Leg Left: Normal (4/5), Arm Right: Normal (3/5), Leg Right: Normal (4/5)  Cerebellar: Finger-to-nose, heel to shin, POLINAs, and fine motor coordination is wnl and without slowing or asymmetry  Sensation: intact to light touch  Tone:Increased tone on RUE especially in the WF,FF and EF                                                                                                                                                                                         NIHSS:6  Modified Maldonado Score: 4     Relevant Labwork:  Recent Labs   Lab 11/15/21  1527 03/15/22  1214   Hemoglobin A1C 7.1 H  --    LDL Cholesterol  --  90.2   HDL  --  59   Triglycerides  --  99   Cholesterol  --  169     Diagnostic Imaging    Brain Imaging  MRI lj 06/23/22:  No acute intracranial process with no acute infarct.  Stable nonspecific prominent areas of signal abnormality within the periventricular white matter as discussed above.     There is marked decreased flow signal within the right transverse sinus.  This may be due to a  developmentally small sinus on that side however superimposed thrombus/stenosis is not excluded as typically the sinus should be better visualized.  The adjacent brain parenchyma however maintains normal signal intensity.  A follow-up postcontrast MR imaging study of the brain could be performed for further evaluation but only if clinically warranted    Vessel Imaging  MRV brain 06/23/22:  No acute intracranial process with no acute infarct.  Stable nonspecific prominent areas of signal abnormality within the periventricular white matter as discussed above.     There is marked decreased flow signal within the right transverse sinus.  This may be due to a developmentally small sinus on that side however superimposed thrombus/stenosis is not excluded as typically the sinus should be better visualized.  The adjacent brain parenchyma however maintains normal signal intensity.  A follow-up postcontrast MR imaging study of the brain could be performed for further evaluation but only if clinically warranted.    Diagnostic Results:  Assessment:  Soila Hairston  is a 67 y.o.  female  seen today in clinic for follow-up assessment and recommendations. The following recommendations and plan were discussed in depth with the patient who voiced understanding and was in agreement.  Plan:  H/O stroke  -No current clinical indication for anticoagulation at this time.   -Plan for aggressive risk factor modification and maximum medical management  - Antithrombotics: Continue Aspirin 81 mg daily,  indefinitely   - Statins: continue satin medication  - Aggressive risk factor modification: HTN, Diet, Exercise, Obesity, DM  - Rehab efforts: none  - Diagnostics ordered/pending: Given incident of HA and dysarthria with evidence of marked decreased flow signal within the right transverse sinus will plan for MRI lj w/wo contrast for better characterization of imaging findings and to help guide future plan of care including need for  long-term dual anti-platelet therapy.    Essential Hypertension   - Continue home medications  - Long term goal 130/80  -Follow up with PCP for chronic management and CV risk reduction    Type 2 diabetes mellitus   Lab Results   Component Value Date    HGBA1C 7.1 (H) 11/15/2021      -Target hemoglobin A1c <7%, measured 2X/year or quarterly if not meeting goals  -Follow up with PCP/endocrinology for chronic management and CV risk reduction    Tobacco dependency  -Stroke risk factor  -Health hazards associated with cigarette smoking were reviewed with patient and cessation was encouraged.   -Follow up with PCP to discuss nicotine replacement therapy    Patient/Family teaching  -A significant amount of time was spent reviewing the patient's stroke risk factors and the importance to modify them in order to reduce the risk of future events.  Also, the importance of a healthy diet and daily exercise in order to reduce the risk of future strokes.   -We discussed the usual stroke warning signs and symptoms, and the need to activate EMS (call 911) as soon as the symptoms present.  - Sudden onset numbness or weakness of the face, arm, or leg;  especially on one side of the body  - Sudden confusion, trouble speaking, or understanding  - Sudden trouble seeing in one or both eyes  - Sudden trouble walking, dizziness, loss of coordination  - Sudden severe headache with no known cause      I will plan on having Soila return to clinic as needed.  The patient can contact my office with any questions or concerns they may have as they arise in the interim.       45  minutes of total time spent on the encounter, which includes face to face time and non-face to face time preparing to see the patient (eg, review of tests), Obtaining and/or reviewing separately obtained history, Documenting clinical information in the electronic or other health record, Independently interpreting results (not separately reported) and communicating results  to the patient/family/caregiver, or Care coordination (not separately reported).     Safia Sol NP-C  Department of Vascular Neurology  [facility]  [phone]

## 2022-07-29 ENCOUNTER — OFFICE VISIT (OUTPATIENT)
Dept: HEMATOLOGY/ONCOLOGY | Facility: CLINIC | Age: 67
End: 2022-07-29
Payer: MEDICARE

## 2022-07-29 DIAGNOSIS — D47.2 MGUS (MONOCLONAL GAMMOPATHY OF UNKNOWN SIGNIFICANCE): Primary | ICD-10-CM

## 2022-07-29 PROCEDURE — 1159F MED LIST DOCD IN RCRD: CPT | Mod: CPTII,95,, | Performed by: NURSE PRACTITIONER

## 2022-07-29 PROCEDURE — 1159F PR MEDICATION LIST DOCUMENTED IN MEDICAL RECORD: ICD-10-PCS | Mod: CPTII,95,, | Performed by: NURSE PRACTITIONER

## 2022-07-29 PROCEDURE — 1160F RVW MEDS BY RX/DR IN RCRD: CPT | Mod: CPTII,95,, | Performed by: NURSE PRACTITIONER

## 2022-07-29 PROCEDURE — 99214 PR OFFICE/OUTPT VISIT, EST, LEVL IV, 30-39 MIN: ICD-10-PCS | Mod: 95,,, | Performed by: NURSE PRACTITIONER

## 2022-07-29 PROCEDURE — 4010F ACE/ARB THERAPY RXD/TAKEN: CPT | Mod: CPTII,95,, | Performed by: NURSE PRACTITIONER

## 2022-07-29 PROCEDURE — 1160F PR REVIEW ALL MEDS BY PRESCRIBER/CLIN PHARMACIST DOCUMENTED: ICD-10-PCS | Mod: CPTII,95,, | Performed by: NURSE PRACTITIONER

## 2022-07-29 PROCEDURE — 4010F PR ACE/ARB THEARPY RXD/TAKEN: ICD-10-PCS | Mod: CPTII,95,, | Performed by: NURSE PRACTITIONER

## 2022-07-29 PROCEDURE — 99214 OFFICE O/P EST MOD 30 MIN: CPT | Mod: 95,,, | Performed by: NURSE PRACTITIONER

## 2022-07-29 NOTE — PROGRESS NOTES
PROGRESS NOTE    Subjective:       Patient ID: Soila Hairston is a 67 y.o. female.  MRN: [id number]  : 1955    Chief Complaint: Anemia   The patient location is: Home  The chief complaint leading to consultation is: Paraproteinemia f/u    Visit type: audiovisual    Face to Face time with patient: 15 minute  20 minutes of total time spent on the encounter, which includes face to face time and non-face to face time preparing to see the patient (eg, review of tests), Obtaining and/or reviewing separately obtained history, Documenting clinical information in the electronic or other health record, Independently interpreting results (not separately reported) and communicating results to the patient/family/caregiver, or Care coordination (not separately reported).         Each patient to whom he or she provides medical services by telemedicine is:  (1) informed of the relationship between the physician and patient and the respective role of any other health care provider with respect to management of the patient; and (2) notified that he or she may decline to receive medical services by telemedicine and may withdraw from such care at any time.    Notes:   History of Present Illness:   Soila Hairston is a 67 y.o. female who presents with chronic anemia. Presents BMT clinic to evaluate paraproteinemia. She has a history of right hemiparesis, on anticoagulation and a history of colitis.     Interim history:  Patient of , seen at BMT clinic due to scheduling issue. Patient will f/u with   Previously evaluated by  for anemia. Work up done, pending bone marrow. Work up showed IgG kappa Monoclonal band 0.73 g/dl()  Recent spinal fusion(). Received 2 units of PRBC during procedure.  She noticed cold intolerance at times. She has a stable right sided weakness following her CVA, stable appetite and PS  She denies any  fever, chills or unintentional weight loss.   Upcoming MRI and neuro f/u.  Presents virtually today for follow up visit. Repeated Myeloma studies shows increased paraprotein. Urine studies pending. No acute events since last visit.  Today's visit was scheduled prior to her requested tests. Reviewed all other labs during previous visits. Patient will re-evaluate once requested tests are completed.     Oncology History:  Oncology History    No history exists.       History:  Past Medical History:   Diagnosis Date    Anticoagulant long-term use     Colitis     Colon polyp     Depression     Diabetes mellitus, type 2     Diverticulosis of colon     Foot drop     a couple weeks ago    Hemorrhoids     Hyperlipidemia     Hypertension     SOB (shortness of breath)     Stroke 12/23/2015      Past Surgical History:   Procedure Laterality Date    ABSCESS DRAINAGE Right     breast    COLONOSCOPY N/A 10/18/2019    Procedure: COLONOSCOPY2 day golytely;  Surgeon: Adryan Schultz MD;  Location: [facility];  Service: General;  Laterality: N/A;    EPIDURAL STEROID INJECTION      ESOPHAGOGASTRODUODENOSCOPY N/A 10/18/2019    Procedure: EGD (ESOPHAGOGASTRODUODENOSCOPY);  Surgeon: Adryan Schultz MD;  Location: [facility];  Service: General;  Laterality: N/A;    TRANSFORAMINAL EPIDURAL INJECTION OF STEROID Left 2/20/2019    Procedure: INJECTION, STEROID, EPIDURAL, TRANSFORAMINAL APPROACH [3999];  Surgeon: Ad Patel III, MD;  Location: [facility];  Service: Pain Management;  Laterality: Left;  L5/S1     Family History   Problem Relation Age of Onset    Dementia Mother     Breast cancer Mother      Social History     Tobacco Use    Smoking status: Current Some Day Smoker     Packs/day: 0.25     Years: 40.00     Pack years: 10.00     Types: Cigarettes    Smokeless tobacco: Never Used    Tobacco comment: smoke 5 cigs a day   Substance and Sexual Activity    Alcohol use: No     Alcohol/week: 0.0 standard  drinks    Drug use: No    Sexual activity: Not Currently        ROS:   Review of Systems   Constitutional: Negative for fever, malaise/fatigue and weight loss.   Respiratory: Negative for cough and shortness of breath.    Cardiovascular: Negative for chest pain and leg swelling.   Gastrointestinal: Negative for constipation, diarrhea, nausea and vomiting.   Skin: Negative for rash.   Neurological: Negative for seizures and weakness.   Psychiatric/Behavioral: The patient is not nervous/anxious.         Objective:       There were no vitals filed for this visit.  Wt Readings from Last 10 Encounters:   07/13/22 78.6 kg (173 lb 4.5 oz)   06/04/22 77.1 kg (170 lb)   01/04/22 82.4 kg (181 lb 10.5 oz)   12/22/21 83 kg (182 lb 15.7 oz)   07/14/21 84.1 kg (185 lb 8.3 oz)   01/13/20 82.6 kg (182 lb)   10/18/19 83.9 kg (185 lb)   05/27/19 84.4 kg (186 lb)   02/20/19 84.7 kg (186 lb 11.7 oz)   02/06/19 77.1 kg (170 lb)       Physical Examination:    Physical exam deferred due to nature of visit  Diagnostic Tests:  Significant Imaging: I have reviewed and interpreted all pertinent imaging results/findings.  CT Chest Without Contrast No results found for this or any previous visit.    CT Chest With ContrastNo results found for this or any previous visit.    CT Abdomen/Pelvis Without Contrast No results found for this or any previous visit.     CT Abdomen/Pelvis With Contrast No results found for this or any previous visit.    CT Abdomen/Pelvis With Without Contrast No results found for this or any previous visit.     PET Scan No results found for this or any previous visit.      Laboratory Data:  All pertinent labs have been reviewed.  Peripheral smear:  PATHOLOGIST INTERPRETATION   RBC- Normocytic anemia without significant morphologic abnormalities.   Rare schistocyte are noted, but no overt features of hemolysis.   WBC- Normal morphology, normal differential. No dysplastic cells or   circulating blasts are present.   PLT-  Normal in number and morphology.   Interpreted by Linda Rojas M.D.     Myeloma panel:  Quantitative IG normal:   K/L ratio 2.49 elevated   IgG kappa Monoclonal band 0.73g/dl    Labs:   Lab Results   Component Value Date    WBC 7.97 07/13/2022    RBC 2.86 (L) 07/13/2022    HGB 8.0 (L) 07/13/2022    HCT 25.2 (L) 07/13/2022    MCV 88 07/13/2022     07/13/2022     (H) 06/04/2022     06/04/2022    K 4.8 06/04/2022    BUN 24 (H) 06/04/2022    CREATININE 1.3 07/20/2022    AST 28 06/04/2022    ALT 16 06/04/2022    BILITOT 0.4 06/04/2022       Assessment/Plan:   MGUS (monoclonal gammopathy of unknown significance)    Work up is reviewed. She has normocytic anemia in the 9 g/dl range, without evidence for hemolysis or nutritional deficiencies.  She does have IgG kappa monoclonal gammopathy and abnormal K/L ration, anemia and renal insufficiency, needs work up to rule out multiple myeloma.   I will request a 24 hour urine DEVI,  bone marrow biopsy and skeletal survey and see her in clinic once the work up is completed. Repeated mm studies shows increased paraprotein. Current (07/13/22) SPEP -  0.82 g/dL, previously 0.73 g/dL    Patient not done requested tests including 24 hr urine, skeletal survey, bone bx- will schedule it ASAP  Coordinate bone survey, bone bx -patient from long distance -   Patient will re-evaluate once requested tests are completed.   Anemia due to other cause, not classified  As above.      ECOG SCORE            BMT Chart Routing  Urgent    Follow up with physician    Follow up with MACHO . Week after bone bx   Infusion scheduling note    Injection scheduling note please schedule previously requested tests   Labs    Lab interval:  Bone biopsy  with next available day. Bone survey requested by . please coordinate appointments together.    Imaging    Order for 24 hour urine, Beta microglobulin on bx day    Pharmacy appointment    Other referrals            Kelsea Garland  NP  Hematology/Oncology/BMT

## 2022-08-01 ENCOUNTER — TELEPHONE (OUTPATIENT)
Dept: HEMATOLOGY/ONCOLOGY | Facility: CLINIC | Age: 67
End: 2022-08-01
Payer: MEDICARE

## 2022-08-01 NOTE — TELEPHONE ENCOUNTER
"----- Message from Rowdy Gay sent at 8/1/2022 11:25 AM CDT -----  Consult/Advisory:          Name Of Caller: Nisreen Hairston (Daughter)       Contact Preference?:  [phone] (Mobile)      Provider Name: Gill      Does patient feel the need to be seen today? No      What is the nature of the call?: Returning call to [place]          Additional Notes:  "Thank you for all that you do for our patients"       "

## 2022-08-01 NOTE — TELEPHONE ENCOUNTER
Explained 24 hour urine collection to pt's daughter. Verified upcoming BMBx appt and imaging appt. Pt's daughter requesting medication to be given prior to BMBx. Will notify Kelsea Garland NP.

## 2022-08-01 NOTE — TELEPHONE ENCOUNTER
"----- Message from Rowdy Gay sent at 8/1/2022 10:05 AM CDT -----  Consult/Advisory:          Name Of Caller: Nisreen Hairston (Daughter)       Contact Preference?: [phone] (Mobile)       Provider Name: Gill      Does patient feel the need to be seen today? No      What is the nature of the call?: Calling to speak w/ nurse about 8/8 and 8/16 appts          Additional Notes:  Stating the matter is "very urgent"      "Thank you for all that you do for our patients"      "

## 2022-08-04 DIAGNOSIS — D47.2 MGUS (MONOCLONAL GAMMOPATHY OF UNKNOWN SIGNIFICANCE): Primary | ICD-10-CM

## 2022-08-04 RX ORDER — ALPRAZOLAM 1 MG/1
1 TABLET ORAL
Qty: 1 TABLET | Refills: 0 | Status: SHIPPED | OUTPATIENT
Start: 2022-08-04 | End: 2022-08-05

## 2022-08-04 NOTE — TELEPHONE ENCOUNTER
I sent the script.   I do not treat bone marrow disorders so it is best for the patient to be seen in the BMT clinic. Thanks

## 2022-08-04 NOTE — TELEPHONE ENCOUNTER
Contacted pt's daughter to notify her of one time dose of Xanax being sent to pharmacy. Pt's daughter verified [facility] is the correct pharmacy. Pt's daughter would like to verify that pt will resume care with Dr. Padgett. Will follow up with Dr. Padgett.

## 2022-08-08 ENCOUNTER — TELEPHONE (OUTPATIENT)
Dept: HEMATOLOGY/ONCOLOGY | Facility: CLINIC | Age: 67
End: 2022-08-08
Payer: MEDICARE

## 2022-08-09 ENCOUNTER — TELEPHONE (OUTPATIENT)
Dept: HEMATOLOGY/ONCOLOGY | Facility: CLINIC | Age: 67
End: 2022-08-09
Payer: MEDICARE

## 2022-08-09 NOTE — TELEPHONE ENCOUNTER
Contacted pt's daughter to inform her pt will be seeing Dr. Del Rosario. Pt's daughter notes pt has been feeling cold and tired. Pt's daughter states pt denies CP, SOB, or feeling lightheaded/dizzy. Advised pt's daughter that pt proceed to ED for any CP, SOB, or feeling lightheaded/dizzy like she is going to pass out. Pt's daughter verbalized understanding.    Discussed with LOU Garland, who is okay to check CBC, CMP tomorrow. No other intervention at this time.    Pt's daughter states she is unable to coordinate tomorrow and would like to arrange for lab work Tuesday after BMBx. Lab appointment made. ED precautions reviewed.     Pt's daughter has questions in regards to completed lab work. Discussed with LOU Garland, who will contact pt's daughter to discuss further tomorrow. Pt's daughter agreeable to plan.

## 2022-08-09 NOTE — TELEPHONE ENCOUNTER
"----- Message from Dyan Yoder RN sent at 8/9/2022  4:23 PM CDT -----  Regarding: FW: Returning a Missed Call    ----- Message -----  From: Negar Rodriguez  Sent: 8/9/2022   4:03 PM CDT  To: Carin Ramsey Staff, Lorin Reid ([facility]) Staff  Subject: Returning a Missed Call                          Contact Preference: Nisreen - daughter [phone]         Patient returning phone call to: Blossom          Additional Notes:  "Thank you for all that you do for our patients'       "

## 2022-08-09 NOTE — TELEPHONE ENCOUNTER
Left voicemail asking pt's daughter to return call to clinic regarding her questions. Informed her pt will be seeing Dr. Del Rosario.

## 2022-08-09 NOTE — TELEPHONE ENCOUNTER
"----- Message from Dyan Yoder RN sent at 8/9/2022  4:23 PM CDT -----  Regarding: FW: Returning a Missed Call    ----- Message -----  From: Negar Rodriguez  Sent: 8/9/2022   4:03 PM CDT  To: Carin Ramsey Staff, Lorin Reid ([facility]) Staff  Subject: Returning a Missed Call                          Contact Preference: Nisreen - daughter [phone]         Patient returning phone call to: Blossom          Additional Notes:  "Thank you for all that you do for our patients'       "

## 2022-08-09 NOTE — TELEPHONE ENCOUNTER
"----- Message from Negar Rodriguez sent at 8/9/2022  4:01 PM CDT -----  Regarding: Returning a Missed Call  Contact Preference: Nisreen - daughter [phone]         Patient returning phone call to: Blossom          Additional Notes:  "Thank you for all that you do for our patients'     "

## 2022-08-16 ENCOUNTER — APPOINTMENT (OUTPATIENT)
Dept: LAB | Facility: HOSPITAL | Age: 67
End: 2022-08-16
Payer: MEDICARE

## 2022-08-16 ENCOUNTER — PROCEDURE VISIT (OUTPATIENT)
Dept: HEMATOLOGY/ONCOLOGY | Facility: CLINIC | Age: 67
End: 2022-08-16
Payer: MEDICARE

## 2022-08-16 VITALS
DIASTOLIC BLOOD PRESSURE: 96 MMHG | HEART RATE: 84 BPM | SYSTOLIC BLOOD PRESSURE: 153 MMHG | RESPIRATION RATE: 16 BRPM | OXYGEN SATURATION: 100 % | TEMPERATURE: 99 F

## 2022-08-16 DIAGNOSIS — D47.2 MGUS (MONOCLONAL GAMMOPATHY OF UNKNOWN SIGNIFICANCE): Primary | ICD-10-CM

## 2022-08-16 LAB
PROT 24H UR-MRATE: 563 MG/SPEC (ref 0–100)
PROT UR-MCNC: 150 MG/DL (ref 0–15)
URINE COLLECTION DURATION: ABNORMAL HR
URINE VOLUME: 375 ML

## 2022-08-16 PROCEDURE — 86335 IMMUNFIX E-PHORSIS/URINE/CSF: CPT | Performed by: NURSE PRACTITIONER

## 2022-08-16 PROCEDURE — 88342 IMHCHEM/IMCYTCHM 1ST ANTB: CPT | Performed by: PATHOLOGY

## 2022-08-16 PROCEDURE — 88342 IMHCHEM/IMCYTCHM 1ST ANTB: CPT | Mod: 26,59,, | Performed by: PATHOLOGY

## 2022-08-16 PROCEDURE — 88189 FLOWCYTOMETRY/READ 16 & >: CPT | Mod: ,,, | Performed by: PATHOLOGY

## 2022-08-16 PROCEDURE — 86335 PATHOLOGIST INTERPRETATION UIFE: ICD-10-PCS | Mod: 26,,, | Performed by: PATHOLOGY

## 2022-08-16 PROCEDURE — 84156 ASSAY OF PROTEIN URINE: CPT | Performed by: NURSE PRACTITIONER

## 2022-08-16 PROCEDURE — 88305 TISSUE EXAM BY PATHOLOGIST: CPT | Mod: 26,,, | Performed by: PATHOLOGY

## 2022-08-16 PROCEDURE — 88305 TISSUE EXAM BY PATHOLOGIST: CPT | Mod: 59 | Performed by: PATHOLOGY

## 2022-08-16 PROCEDURE — 85097 PR  BONE MARROW,SMEAR INTERPRETATION: ICD-10-PCS | Mod: ,,, | Performed by: PATHOLOGY

## 2022-08-16 PROCEDURE — 88364 INSITU HYBRIDIZATION (FISH): CPT | Mod: 26,,, | Performed by: PATHOLOGY

## 2022-08-16 PROCEDURE — 88274 CYTOGENETICS 25-99: CPT | Performed by: NURSE PRACTITIONER

## 2022-08-16 PROCEDURE — 88365 INSITU HYBRIDIZATION (FISH): CPT | Mod: 26,,, | Performed by: PATHOLOGY

## 2022-08-16 PROCEDURE — 84166 PROTEIN E-PHORESIS/URINE/CSF: CPT | Mod: 26,,, | Performed by: PATHOLOGY

## 2022-08-16 PROCEDURE — 88365 PR  TISSUE HYBRIDIZATION: ICD-10-PCS | Mod: 26,,, | Performed by: PATHOLOGY

## 2022-08-16 PROCEDURE — 88364 CHG INSITU HYBRIDIZATION (FISH: ICD-10-PCS | Mod: 26,,, | Performed by: PATHOLOGY

## 2022-08-16 PROCEDURE — 88189 PR  FLOWCYTOMETRY/READ, 16 & > MARKERS: ICD-10-PCS | Mod: ,,, | Performed by: PATHOLOGY

## 2022-08-16 PROCEDURE — 88185 FLOWCYTOMETRY/TC ADD-ON: CPT | Mod: 91 | Performed by: NURSE PRACTITIONER

## 2022-08-16 PROCEDURE — 84166 PROTEIN E-PHORESIS/URINE/CSF: CPT | Performed by: NURSE PRACTITIONER

## 2022-08-16 PROCEDURE — 86335 IMMUNFIX E-PHORSIS/URINE/CSF: CPT | Mod: 26,,, | Performed by: PATHOLOGY

## 2022-08-16 PROCEDURE — 88311 DECALCIFY TISSUE: CPT | Performed by: PATHOLOGY

## 2022-08-16 PROCEDURE — 84166 PATHOLOGIST INTERPRETATION UPE: ICD-10-PCS | Mod: 26,,, | Performed by: PATHOLOGY

## 2022-08-16 PROCEDURE — 88342 CHG IMMUNOCYTOCHEMISTRY: ICD-10-PCS | Mod: 26,59,, | Performed by: PATHOLOGY

## 2022-08-16 PROCEDURE — 88313 SPECIAL STAINS GROUP 2: CPT | Mod: 26,,, | Performed by: PATHOLOGY

## 2022-08-16 PROCEDURE — 88311 DECALCIFY TISSUE: CPT | Mod: 26,,, | Performed by: PATHOLOGY

## 2022-08-16 PROCEDURE — 88274 CYTOGENETICS 25-99: CPT | Mod: 59 | Performed by: NURSE PRACTITIONER

## 2022-08-16 PROCEDURE — 88311 PR  DECALCIFY TISSUE: ICD-10-PCS | Mod: 26,,, | Performed by: PATHOLOGY

## 2022-08-16 PROCEDURE — 88341 IMHCHEM/IMCYTCHM EA ADD ANTB: CPT | Mod: 26,59,, | Performed by: PATHOLOGY

## 2022-08-16 PROCEDURE — 88364 INSITU HYBRIDIZATION (FISH): CPT | Mod: 59 | Performed by: PATHOLOGY

## 2022-08-16 PROCEDURE — 88305 TISSUE EXAM BY PATHOLOGIST: ICD-10-PCS | Mod: 26,,, | Performed by: PATHOLOGY

## 2022-08-16 PROCEDURE — 88313 PR  SPECIAL STAINS,GROUP II: ICD-10-PCS | Mod: 26,,, | Performed by: PATHOLOGY

## 2022-08-16 PROCEDURE — 88264 CHROMOSOME ANALYSIS 20-25: CPT | Performed by: NURSE PRACTITIONER

## 2022-08-16 PROCEDURE — 88184 FLOWCYTOMETRY/ TC 1 MARKER: CPT | Mod: 59 | Performed by: PATHOLOGY

## 2022-08-16 PROCEDURE — 38222 BONE MARROW: ICD-10-PCS | Mod: LT,S$GLB,, | Performed by: NURSE PRACTITIONER

## 2022-08-16 PROCEDURE — 88313 SPECIAL STAINS GROUP 2: CPT | Mod: 59 | Performed by: PATHOLOGY

## 2022-08-16 PROCEDURE — 88342 IMHCHEM/IMCYTCHM 1ST ANTB: CPT | Mod: 91 | Performed by: PATHOLOGY

## 2022-08-16 PROCEDURE — 88341 IMHCHEM/IMCYTCHM EA ADD ANTB: CPT | Mod: 59 | Performed by: PATHOLOGY

## 2022-08-16 PROCEDURE — 38222 DX BONE MARROW BX & ASPIR: CPT | Mod: LT,S$GLB,, | Performed by: NURSE PRACTITIONER

## 2022-08-16 PROCEDURE — 88341 PR IHC OR ICC EACH ADD'L SINGLE ANTIBODY  STAINPR: ICD-10-PCS | Mod: 26,59,, | Performed by: PATHOLOGY

## 2022-08-16 PROCEDURE — 88365 INSITU HYBRIDIZATION (FISH): CPT | Mod: 59 | Performed by: PATHOLOGY

## 2022-08-16 PROCEDURE — 88184 FLOWCYTOMETRY/ TC 1 MARKER: CPT | Performed by: NURSE PRACTITIONER

## 2022-08-16 PROCEDURE — 88341 IMHCHEM/IMCYTCHM EA ADD ANTB: CPT | Performed by: PATHOLOGY

## 2022-08-16 PROCEDURE — 85097 BONE MARROW INTERPRETATION: CPT | Mod: ,,, | Performed by: PATHOLOGY

## 2022-08-16 PROCEDURE — 88237 TISSUE CULTURE BONE MARROW: CPT | Performed by: NURSE PRACTITIONER

## 2022-08-16 PROCEDURE — 88271 CYTOGENETICS DNA PROBE: CPT | Mod: 59 | Performed by: NURSE PRACTITIONER

## 2022-08-16 PROCEDURE — 88185 FLOWCYTOMETRY/TC ADD-ON: CPT | Mod: 59 | Performed by: PATHOLOGY

## 2022-08-16 RX ORDER — LIDOCAINE HYDROCHLORIDE 20 MG/ML
10 INJECTION, SOLUTION EPIDURAL; INFILTRATION; INTRACAUDAL; PERINEURAL ONCE
Status: COMPLETED | OUTPATIENT
Start: 2022-08-16 | End: 2022-08-16

## 2022-08-16 RX ADMIN — LIDOCAINE HYDROCHLORIDE 7 ML: 20 INJECTION, SOLUTION EPIDURAL; INFILTRATION; INTRACAUDAL; PERINEURAL at 01:08

## 2022-08-16 NOTE — PROCEDURES
Patient with paraproteinemia presented at BMT clinic for diagnostic bone marrow biopsy. Tolerated procedure well. Not in any distress.  See My previous note  for full history. Reviewed medications, allergies and labs.     Kelsea Garland NP  Hematology/Oncology/BMT

## 2022-08-16 NOTE — PROCEDURES
Bone marrow    Date/Time: 8/16/2022 12:30 PM  Performed by: Kelsea Garland NP  Authorized by: Kelsea Garland NP     Aspiration?: Yes   Biopsy?: Yes      PROCEDURE NOTE:  Date of Procedure: 08/16/2022   Bone Marrow Biopsy and Aspiration  Indication: Paraproteinemia, diagnostic  Consent: Informed consent was obtained from patient.  Timeout: Done and documented.  Position: Prone  Site: Left posterior illiac crest.  Prep: Betadine.  Needle used: 11 gauge Jamshidi needle.  Anesthetic: 2% lidocaine 7 cc.  Biopsy: The biopsy needle was introduced into the marrow cavity and an aspirate was obtained without complications and sent for flow cytometry,PCPD and cytogenetics. Core biopsy obtained without difficulty and sent for routine histologic examination.  Complications: None.  Disposition: Left patient with primary nurse,instructed to lay on back for 20 minutes. Advised not to take shower and keep dressing clean, dry & intact for 24 hours.  Blood loss: Minimal.     Kelsea Garland NP  Hematology/Oncology/BMT

## 2022-08-17 ENCOUNTER — PATIENT MESSAGE (OUTPATIENT)
Dept: HEMATOLOGY/ONCOLOGY | Facility: CLINIC | Age: 67
End: 2022-08-17
Payer: MEDICARE

## 2022-08-17 LAB
CHROM BANDING METHOD: NORMAL
CHROMOSOME ANALYSIS BM ADDITIONAL INFORMATION: NORMAL
CHROMOSOME ANALYSIS BM RELEASED BY: NORMAL
CHROMOSOME ANALYSIS BM RESULT SUMMARY: NORMAL
CLINICAL CYTOGENETICIST REVIEW: NORMAL
INTERPRETATION UR IFE-IMP: NORMAL
KARYOTYP MAR: NORMAL
PCPDS FINAL DIAGNOSIS: NORMAL
PCPDS PRE-ANALYSIS PRE-SORT: NORMAL
REASON FOR REFERRAL (NARRATIVE): NORMAL
REF LAB TEST METHOD: NORMAL
SPECIMEN SOURCE: NORMAL
SPECIMEN: NORMAL

## 2022-08-18 ENCOUNTER — TELEPHONE (OUTPATIENT)
Dept: NEUROLOGY | Facility: CLINIC | Age: 67
End: 2022-08-18

## 2022-08-18 ENCOUNTER — TELEPHONE (OUTPATIENT)
Dept: HEMATOLOGY/ONCOLOGY | Facility: CLINIC | Age: 67
End: 2022-08-18
Payer: MEDICARE

## 2022-08-18 LAB
PATHOLOGIST INTERPRETATION UIFE: NORMAL
PATHOLOGIST INTERPRETATION UPE: NORMAL
PROT PATTERN UR ELPH-IMP: NORMAL

## 2022-08-18 NOTE — TELEPHONE ENCOUNTER
"----- Message from Sharon Dueñas sent at 8/18/2022  8:23 AM CDT -----  Regarding: Consult/Advisory:    Name Of Caller:  Nisreen (daughter)     Contact Preference?:    [phone]       What is the nature of the call?:  Pt's daughter would like the office to give her a call to discuss pt's test results.             Additional Notes:  "Thank you for all that you do for our patients'"     "

## 2022-08-18 NOTE — TELEPHONE ENCOUNTER
----- Message from Kenroy Mccracken sent at 8/18/2022  8:37 AM CDT -----  Contact: Nisreen-daughter  Pt daughter requesting call back RE: Would like to schedule virtual appt if possible to go over results. Nothing available in epic      Confirmed contact below:  Contact Name:Soila Hairston  Phone Number: [phone]

## 2022-08-18 NOTE — TELEPHONE ENCOUNTER
Per Gill, NP, CBC is stable. Urine studies and BMBx studies pending. Notified daughter. Daughter verbalized understanding. Informed daughter Dr. Del Rosario will be able to go over the results at the follow up appt. Daughter verbalized understanding.

## 2022-08-19 LAB
BODY SITE - BONE MARROW: NORMAL
CLINICAL DIAGNOSIS - BONE MARROW: NORMAL
FLOW CYTOMETRY ANTIBODIES ANALYZED - BONE MARROW: NORMAL
FLOW CYTOMETRY COMMENT - BONE MARROW: NORMAL
FLOW CYTOMETRY INTERPRETATION - BONE MARROW: NORMAL

## 2022-08-22 NOTE — TELEPHONE ENCOUNTER
Spoke with pts daughter Nisreen about setting up a virtual appointment with Safia to go over the 7/29/22 MRI results. Nisreen wanted a Friday virtual visit but Safia is only available on Tuesdays and Wednesdays. Nisreen asked if Safia can give her a phone call on Friday to briefly review the results.   I informed her this was brought to Lisandro attention and Safia will be reaching out to Nisreen to go over the results.   Nisreen verbalized understanding.

## 2022-08-24 LAB
CHROM BANDING METHOD: NORMAL
CHROMOSOME ANALYSIS BM ADDITIONAL INFORMATION: NORMAL
CHROMOSOME ANALYSIS BM RELEASED BY: NORMAL
CHROMOSOME ANALYSIS BM RESULT SUMMARY: NORMAL
CLINICAL CYTOGENETICIST REVIEW: NORMAL
KARYOTYP MAR: NORMAL
REASON FOR REFERRAL (NARRATIVE): NORMAL
REF LAB TEST METHOD: NORMAL
SPECIMEN SOURCE: NORMAL
SPECIMEN: NORMAL

## 2022-08-29 LAB
COMMENT: NORMAL
FINAL PATHOLOGIC DIAGNOSIS: NORMAL
GROSS: NORMAL
Lab: NORMAL
MICROSCOPIC EXAM: NORMAL

## 2022-08-31 ENCOUNTER — PATIENT MESSAGE (OUTPATIENT)
Dept: HEMATOLOGY/ONCOLOGY | Facility: CLINIC | Age: 67
End: 2022-08-31
Payer: MEDICARE

## 2022-08-31 LAB
GENETICIST REVIEW: NORMAL
PLASMA CELL PROLIF RELEASED BY: NORMAL
PLASMA CELL PROLIF RESULT SUMMARY: NORMAL
PLASMA CELL PROLIF RESULT TABLE: NORMAL
REASON FOR REFERRAL, PLASMA CELL PROLIF (PCPD), FISH: NORMAL
REF LAB TEST METHOD: NORMAL
RESULTS, PLASMA CELL PROLIF (PCPD), FISH: NORMAL
SERVICE CMNT-IMP: NORMAL
SERVICE CMNT-IMP: NORMAL
SPECIMEN SOURCE: NORMAL
SPECIMEN, PLASMA CELL PROLIF (PCPD), FISH: NORMAL

## 2022-09-02 ENCOUNTER — OFFICE VISIT (OUTPATIENT)
Dept: HEMATOLOGY/ONCOLOGY | Facility: CLINIC | Age: 67
End: 2022-09-02
Payer: MEDICARE

## 2022-09-02 DIAGNOSIS — I10 ESSENTIAL HYPERTENSION: Primary | ICD-10-CM

## 2022-09-02 DIAGNOSIS — D64.9 CHRONIC ANEMIA: ICD-10-CM

## 2022-09-02 DIAGNOSIS — N18.31 STAGE 3A CHRONIC KIDNEY DISEASE: ICD-10-CM

## 2022-09-02 DIAGNOSIS — E78.2 MIXED HYPERLIPIDEMIA: ICD-10-CM

## 2022-09-02 DIAGNOSIS — D47.2 MGUS (MONOCLONAL GAMMOPATHY OF UNKNOWN SIGNIFICANCE): ICD-10-CM

## 2022-09-02 DIAGNOSIS — D49.89 PLASMA CELL NEOPLASM: ICD-10-CM

## 2022-09-02 PROCEDURE — 4010F PR ACE/ARB THEARPY RXD/TAKEN: ICD-10-PCS | Mod: CPTII,95,, | Performed by: INTERNAL MEDICINE

## 2022-09-02 PROCEDURE — 99214 OFFICE O/P EST MOD 30 MIN: CPT | Mod: 95,,, | Performed by: INTERNAL MEDICINE

## 2022-09-02 PROCEDURE — 4010F ACE/ARB THERAPY RXD/TAKEN: CPT | Mod: CPTII,95,, | Performed by: INTERNAL MEDICINE

## 2022-09-02 PROCEDURE — 99214 PR OFFICE/OUTPT VISIT, EST, LEVL IV, 30-39 MIN: ICD-10-PCS | Mod: 95,,, | Performed by: INTERNAL MEDICINE

## 2022-09-02 NOTE — PROGRESS NOTES
PROGRESS NOTE    Subjective:       Patient ID: Soila Hairston is a 67 y.o. female.  MRN: [id number]  : 1955  i  Chief Complaint: Anemia   The patient location is: Home  The chief complaint leading to consultation is: Paraproteinemia f/u    Visit type: audiovisual    Face to Face time with patient: 15 minute  20 minutes of total time spent on the encounter, which includes face to face time and non-face to face time preparing to see the patient (eg, review of tests), Obtaining and/or reviewing separately obtained history, Documenting clinical information in the electronic or other health record, Independently interpreting results (not separately reported) and communicating results to the patient/family/caregiver, or Care coordination (not separately reported).         Each patient to whom he or she provides medcal services by telemedicine is:  (1) informed of the relationship between the physician and patient and the respective role of any other health care provider with respect to management of the patient; and (2) notified that he or she may decline to receive medical services by telemedicine and may withdraw from such care at any time.    Notes:   History of Present Illness:   Soila Hairston is a 67 y.o. female who presents with chronic anemia. Presents BMT clinic to evaluate paraproteinemia. She has a history of right hemiparesis, on anticoagulation and a history of colitis.     Interim history:  Patient of , here for virtual f/u after bone amrrow biopsy.    was Previously evaluated by  for anemia. Work up showed IgG kappa Monoclonal band 0.73 g/dl()  Recent spinal fusion(). Received 2 units of PRBC during procedure.  She noticed cold intolerance at times. She has a stable right sided weakness following her CVA, stable appetite and PS  She denies any fever, chills or unintentional weight loss.               ROS:   Review of Systems   Constitutional:  Negative for fever, malaise/fatigue and weight loss.   Respiratory:  Negative for cough and shortness of breath.    Cardiovascular:  Negative for chest pain and leg swelling.   Gastrointestinal:  Negative for constipation, diarrhea, nausea and vomiting.   Skin:  Negative for rash.   Neurological:  Negative for seizures and weakness.   Psychiatric/Behavioral:  The patient is not nervous/anxious.             Physical Examination:    Physical exam deferred due to nature of visit    Laboratory Data:  All pertinent labs have been reviewed.  Peripheral smear:  PATHOLOGIST INTERPRETATION   RBC- Normocytic anemia without significant morphologic abnormalities.   Rare schistocyte are noted, but no overt features of hemolysis.   WBC- Normal morphology, normal differential. No dysplastic cells or   circulating blasts are present.   PLT- Normal in number and morphology.   Interpreted by Linda Rojas M.D.       8/5/22 skeletal survery    COMPARISON:  None     FINDINGS:  There are surgical changes involving the lumbar spine with spinal rods and pedicle screws extending from L3 the S1.  There is no evidence for hardware failure.  The patient is edentulous.  There are degenerative changes within the cervical, thoracic, and lumbar spine.  There are healed fracture deformities involving the right pubic bones.  There is no evidence for acute fracture or bone destruction.  Atherosclerotic calcification is present within the abdominal aorta as well as within the pelvic and proximal thigh vasculature.  There is a calcification within the pelvis which has been present on prior examinations, possibly a partially calcified uterine fibroid.     Impression:     No evidence for lytic or sclerotic bone lesions in this patient with history of monoclonal gammopathy.     Surgical changes of the lumbar spine.     Spondylosis.     Atherosclerosis.     Probable calcified uterine fibroid,  stable when compared to prior examinations dating back to 02/03/2017.       8/16/22 BONE MARROW ASPIRATE, TOUCH PREP, CLOT, AND DECALCIFIED NEEDLE CORE BIOPSY: LEFT POSTEROSUPERIOR ILIAC CREST     -tab KAPPA LIGHT CHAIN RESTRICTED, CYCLIN D1(-) PLASMA CELL NEOPLASM WITH PARTIAL CD56+ CO-EXPRESSION  AND PARTIAL LOSS OF CD19 EXPRESSION (see comments)     -tab Mildly hypercellular marrow (40-50% total cellularity) with predominantly 3-5% involvement by plasma cell neoplasm and mild erythroid hyperplasia     -tab Normal female karyotype (see comments)     -tab Mildly increased stainable histiocytic iron stores (4+ out of 6+)     -tab Negative for amyloid deposition by [place] Red special stain     Iron Stain: Stainable histiocytic iron stores cannot be optimally evaluated due to aspiculate iron-stained aspirate smear. No ring sideroblasts are identified.     Preliminary findings and case sign-out delay due to need to send out for kappa and lambda immunoglobulin light chain immunostains (due to failed kappa and lambda DUSTIN x2 and no detectable plasma cell light chain restriction by flow cytometry) for definitive light chain expression   Corresponding flow cytometry ([facility] detects 0.1% plasma cells with no definitive light chain restriction (although kappa predominant) and aberrant   partial CD56 co-expression and partial loss of CD19 expression and no clonal B-cells, aberrant T-cell antigen   expression, or increase in blasts.  Flow cytometric immunophenotyping typically underestimates the percentage of plasma cells present.   ABNORMAL PLASMA CELL POPULATION IDENTIFIED (0.1% of total cellularity) Forward scatter: Low to intermediate   Side scatter: Low to intermediate   Positive:  CD38 (bright),  (variable), CD19 (variable), CD56 (neg to dim)   Negative:  CD45, CD20, sIg, cyIg   Chromosomal Analysis, Bone Marrow Aspirate : NORMAL 46,XX[20]. No clonal abnormality was apparent.     8/16/22 PCPD FISH     The result  is abnormal and indicates a plasma cell clone with an immunoglobulin heavy chain (IGH) gene rearrangement that did not translocate to one of the common plasma cell   dyscrasia translocation partners (FGFR3, CCND1, MAF or MAFB). In plasma cell dyscrasias, the prognostic significance for this clone is uncertain (Zhao HDZ et al.,   [facility] Rev [facility] Onc 15:409-421, 2018).     8/16/22 24hr UPEP: Albumin is the predominant urine protein representing approximately 79% of total urine protein.   No paraprotein bands identified.   8/16/22 UIFE: A faint IgG kappa specific monoclonal band present.  Component      Latest Ref Rng & Units 6/4/2022   Sodium      136 - 145 mmol/L 143   Potassium      3.5 - 5.1 mmol/L 4.8   Chloride      95 - 110 mmol/L 113 (H)   CO2      23 - 29 mmol/L 23   Glucose      70 - 110 mg/dL 171 (H)   BUN      7 - 17 mg/dL 24 (H)   Creatinine      0.50 - 1.40 mg/dL 1.24   Calcium      8.7 - 10.5 mg/dL 8.6 (L)   PROTEIN TOTAL      6.0 - 8.4 g/dL 7.3   Albumin      3.5 - 5.2 g/dL 3.3 (L)   BILIRUBIN TOTAL      0.1 - 1.0 mg/dL 0.4   Alkaline Phosphatase      38 - 126 U/L 128 (H)   AST      15 - 46 U/L 28   ALT      10 - 44 U/L 16   Anion Gap      8 - 16 mmol/L 7 (L)   eGFR if African American      >60 mL/min/1.73 m:2 51.6 (A)   eGFR if non African American      >60 mL/min/1.73 m:2 44.7 (A)     Component      Latest Ref Rng & Units 8/16/2022 8/5/2022 7/20/2022 7/13/2022   WBC      3.90 - 12.70 K/uL 8.78      RBC      4.00 - 5.40 M/uL 2.72 (L)      Hemoglobin      12.0 - 16.0 g/dL 8.0 (L)      Hematocrit      37.0 - 48.5 % 24.7 (L)      MCV      82 - 98 fL 91      MCH      27.0 - 31.0 pg 29.4      MCHC      32.0 - 36.0 g/dL 32.4      RDW      11.5 - 14.5 % 15.2 (H)      Platelets      150 - 450 K/uL 179      MPV      9.2 - 12.9 fL 11.6      Immature Granulocytes      0.0 - 0.5 % 0.6 (H)      Gran # (ANC)      1.8 - 7.7 K/uL 5.2      Immature Grans (Abs)      0.00 - 0.04 K/uL 0.05 (H)      Lymph #      1.0 - 4.8  K/uL 3.1      Mono #      0.3 - 1.0 K/uL 0.4      Eos #      0.0 - 0.5 K/uL 0.1      Baso #      0.00 - 0.20 K/uL 0.01      nRBC      0 /100 WBC 0      Gran %      38.0 - 73.0 % 59.3      Lymph %      18.0 - 48.0 % 34.7      Mono %      4.0 - 15.0 % 4.2      Eosinophil %      0.0 - 8.0 % 1.1      Basophil %      0.0 - 1.9 % 0.1      Differential Method       Automated      Iron      30 - 160 ug/dL    62   Transferrin      200 - 375 mg/dL    172 (L)   TIBC      250 - 450 ug/dL    255   Saturated Iron      20 - 50 %    24   Creatinine      0.5 - 1.4 mg/dL   1.3    eGFR if African American      >60 mL/min/1.73 m:2   48.7 (A)    eGFR if non African American      >60 mL/min/1.73 m:2   42.3 (A)    Ferritin      20.0 - 300.0 ng/mL    787 (H)   Beta-2 Microglobulin      0.0 - 2.5 ug/mL  4.2 (H)       Component      Latest Ref Rng & Units 7/13/2022   Iron      30 - 160 ug/dL 62   Transferrin      200 - 375 mg/dL 172 (L)   TIBC      250 - 450 ug/dL 255   Saturated Iron      20 - 50 % 24   IgG      650 - 1600 mg/dL 1831 (H)   IgA      40 - 350 mg/dL 197   IgM      50 - 300 mg/dL 59   Kappa Free Light Chains      0.33 - 1.94 mg/dL 14.59 (H)   Lambda Free Light Chains      0.57 - 2.63 mg/dL 4.06 (H)   Kappa/Lambda FLC Ratio      0.26 - 1.65 3.59 (H)   Ferritin      20.0 - 300.0 ng/mL 787 (H)         Assessment/Plan:   MGUS (monoclonal gammopathy of unknown significance)  2.Chronic anemia      Plan:    Skeletal survey on 8/3/22 negative for lytic/ sclerotic bone lesions. 24hr UPEP/UIFE shows faint monoclonal IgG kappa paraprotein on 8/16/22. BM biopsy on 8/16/22 demonstrated mildly hypercellular marrow (40-50% total cellularity) with predominantly 3-5% involvement by plasma cell neoplasm and mild erythroid hyperplasia. Calcium WNL in June 2022. She will have PET CT to exclude lytic lesions, if approved by insurance.Overall, it appears she has intermediate risk MGUS ( due to abnormal sFLC ratio) .       2. Nutritional (Iron, b12,  folate) etiology excluded. She has CKD 3, which is possibly contributing. Anemia is normocytic. No dysplastic changes on bone marrow biopsy. Hgb in 2013 was 10.8g/dl. Most recent Hgb  is 8g/dl. LDH, bilirubin, reticulocytes all previously normal.  She might benefit from procrit.       BMT Chart Routing      Follow up with physician    Follow up with [facility]    Infusion scheduling note    Injection scheduling note    Labs    Imaging PET scan   in 1-2 weeks   Pharmacy appointment    Other referrals

## 2022-09-20 ENCOUNTER — PATIENT MESSAGE (OUTPATIENT)
Dept: HEMATOLOGY/ONCOLOGY | Facility: CLINIC | Age: 67
End: 2022-09-20
Payer: MEDICARE

## 2022-10-07 ENCOUNTER — TELEPHONE (OUTPATIENT)
Dept: NEUROLOGY | Facility: CLINIC | Age: 67
End: 2022-10-07
Payer: MEDICARE

## 2022-10-07 NOTE — TELEPHONE ENCOUNTER
----- Message from Radha Fernandez sent at 10/7/2022 12:13 PM CDT -----  Regarding: questions  Contact: [phone]  Pt Questions    Who Called:Pt daughter Nisreen   Questions:Calling to speak with the provider   Call Back number: [phone]

## 2022-10-07 NOTE — TELEPHONE ENCOUNTER
"Nisreen stated another MRI was ordered for the pt after abnormal results were observed on the June 23, 2022 images. Nisreen reported more MRIs were ordered for the patient, and is concerned the pt has not been contacted to discuss the results. She said, "I have been trying to remain optimistic and that no news has to be good news". I informed Nisreen that her request to discuss the most recent MRI results will be forwarded to Safia's attention, who will contact Nisreen when she is available to. Nisreen verbalized understanding and was thankful for the call.   "

## 2022-10-10 ENCOUNTER — PATIENT MESSAGE (OUTPATIENT)
Dept: NEUROLOGY | Facility: CLINIC | Age: 67
End: 2022-10-10
Payer: MEDICARE

## 2023-05-05 ENCOUNTER — TELEPHONE (OUTPATIENT)
Dept: HEMATOLOGY/ONCOLOGY | Facility: CLINIC | Age: 68
End: 2023-05-05
Payer: MEDICARE

## 2023-05-05 ENCOUNTER — PATIENT MESSAGE (OUTPATIENT)
Dept: HEMATOLOGY/ONCOLOGY | Facility: CLINIC | Age: 68
End: 2023-05-05
Payer: MEDICARE

## 2023-05-05 ENCOUNTER — PATIENT MESSAGE (OUTPATIENT)
Dept: NEUROLOGY | Facility: CLINIC | Age: 68
End: 2023-05-05
Payer: MEDICARE

## 2023-05-05 NOTE — TELEPHONE ENCOUNTER
Spoke to pts daughter via phone. Pt c/o urinary frequency and being cold all the time. H/O MGUS/anemia. Notified Kelsea Garland of pt call to see if labs/appt needed. Instructed pt someone will be in touch.

## 2023-05-05 NOTE — TELEPHONE ENCOUNTER
"Called pt regarding message asking for a call. Spoke w pt's daughter, Nisreen.    Nisreen stated that her mother (pt) recently lost her  so her stress levels and anxiety has been a bit higher than normal. She stated that she has been getting a bit confused, and was hoping that Safia can explain the results to the MRI that was last done in July of 2022. She stated that she assumes her mother is okay, because she has not heard about it, but no one ever explained the results to the MRI. Nisreen stated that pt has been reporting headaches and stated that the MRI specified that a "spot" was seen on the image. She asked that I talk to Safia Sol about giving her a call to go over results. I told Nisreen that I can send a message to Safia and let her know when I hear back. Pt's daughter verbalized understanding. I asked if pt would be willing to come in for an appt to go over results since it seems like she has a lot of questions, but pt's daughter stated that she is not very mobile, and with the recent loss of her , she most likely won't be up to coming in. Daughter stated that she could do a virtual appt if necessary.  "

## 2023-06-03 ENCOUNTER — NURSE TRIAGE (OUTPATIENT)
Dept: ADMINISTRATIVE | Facility: CLINIC | Age: 68
End: 2023-06-03
Payer: MEDICARE

## 2023-06-03 NOTE — TELEPHONE ENCOUNTER
Reason for Disposition   Patient sounds very sick or weak to the triager    Additional Information   Negative: Unconscious or difficult to awaken   Negative: Acting confused (e.g., disoriented, slurred speech)   Negative: Very weak (e.g., can't stand)   Negative: Sounds like a life-threatening emergency to the triager   Negative: [1] Vomiting AND [2] signs of dehydration (e.g., very dry mouth, lightheaded, dark urine)   Negative: [1] Blood glucose > 240 mg/dL (13.3 mmol/L) AND [2] rapid breathing   Negative: Blood glucose > 500 mg/dL (27.8 mmol/L)   Negative: [1] Blood glucose > 240 mg/dL (13.3 mmol/L) AND [2] urine ketones moderate-large (or more than 1+)   Negative: [1] Blood glucose > 240 mg/dL (13.3 mmol/L) AND [2] blood ketones > 1.4 mmol/L   Negative: [1] Blood glucose > 240 mg/dL (13.3 mmol/L) AND [2] vomiting AND [3] unable to check for ketones (in blood or urine)   Negative: [1] New-onset diabetes suspected (e.g., frequent urination, weak, weight loss) AND [2] vomiting or rapid breathing   Negative: Vomiting lasts > 4 hours    Protocols used: Diabetes - High Blood Sugar-A-    Soila's daughter Nisreen called to say her mom's CBG is 454 now. She does have DM, but never has been this high she said. Soila states that she ate two cookies and drank sweet lemonade about 3 hours ago, but she has had sweets before and this has not occurred, per Nisreen. Last labs noted in her chart are from 2022. Her speech is slurred, but Nisreen says she has had a stroke and this is normal for her since the stroke. She is incontinent of urine, has been depressed, and her right leg is swollen from her foot to her knee and painful and red.  She is complaining of being cold.  Per [facility] triage protocol, recommend ED now for evaluation.  She does not want to go. Of note, Soila's  and Nisreen's father  on 04/15/2023.  Went to the ED for what family thought was a cold and masses were found in his chest.  He  declined quickly, per Soila ,  soon after. Soila says she is afraid to go for this reason; she is concerned she may be ill. Discussed reason for recommendation to go to ED, and she does agree now to go.  Nisreen said she will bring her now.  Message to Jc Lockhart MD,  her non-[facility] pcp, Dr Del Rosario, BMT, and Jeanette Padgett MD, hem onc.  Please contact caller directly with any additional care advice.   .

## 2023-07-07 ENCOUNTER — PATIENT MESSAGE (OUTPATIENT)
Dept: INFECTIOUS DISEASES | Facility: CLINIC | Age: 68
End: 2023-07-07
Payer: MEDICARE

## 2023-08-18 ENCOUNTER — OFFICE VISIT (OUTPATIENT)
Dept: CARDIOLOGY | Facility: CLINIC | Age: 68
End: 2023-08-18
Payer: MEDICARE

## 2023-08-18 VITALS
BODY MASS INDEX: 35.13 KG/M2 | WEIGHT: 190.88 LBS | SYSTOLIC BLOOD PRESSURE: 166 MMHG | HEIGHT: 62 IN | OXYGEN SATURATION: 97 % | HEART RATE: 86 BPM | DIASTOLIC BLOOD PRESSURE: 84 MMHG

## 2023-08-18 DIAGNOSIS — I10 PRIMARY HYPERTENSION: Primary | ICD-10-CM

## 2023-08-18 DIAGNOSIS — N18.31 STAGE 3A CHRONIC KIDNEY DISEASE: ICD-10-CM

## 2023-08-18 DIAGNOSIS — Z79.4 DIABETES MELLITUS DUE TO UNDERLYING CONDITION WITH HYPEROSMOLARITY WITHOUT COMA, WITH LONG-TERM CURRENT USE OF INSULIN: ICD-10-CM

## 2023-08-18 DIAGNOSIS — R60.0 LOCALIZED EDEMA: ICD-10-CM

## 2023-08-18 DIAGNOSIS — E66.09 CLASS 1 OBESITY DUE TO EXCESS CALORIES WITHOUT SERIOUS COMORBIDITY WITH BODY MASS INDEX (BMI) OF 34.0 TO 34.9 IN ADULT: ICD-10-CM

## 2023-08-18 DIAGNOSIS — I63.9 CEREBROVASCULAR ACCIDENT (CVA), UNSPECIFIED MECHANISM: ICD-10-CM

## 2023-08-18 DIAGNOSIS — F17.200 SMOKER: ICD-10-CM

## 2023-08-18 DIAGNOSIS — I10 ESSENTIAL HYPERTENSION: ICD-10-CM

## 2023-08-18 DIAGNOSIS — I25.10 CORONARY ARTERY DISEASE INVOLVING NATIVE CORONARY ARTERY OF NATIVE HEART WITHOUT ANGINA PECTORIS: ICD-10-CM

## 2023-08-18 DIAGNOSIS — R06.02 SOB (SHORTNESS OF BREATH): ICD-10-CM

## 2023-08-18 DIAGNOSIS — M79.89 LEG SWELLING: ICD-10-CM

## 2023-08-18 DIAGNOSIS — E78.5 HYPERLIPIDEMIA, UNSPECIFIED HYPERLIPIDEMIA TYPE: ICD-10-CM

## 2023-08-18 DIAGNOSIS — I70.0 AORTIC ATHEROSCLEROSIS: ICD-10-CM

## 2023-08-18 DIAGNOSIS — E08.00 DIABETES MELLITUS DUE TO UNDERLYING CONDITION WITH HYPEROSMOLARITY WITHOUT COMA, WITH LONG-TERM CURRENT USE OF INSULIN: ICD-10-CM

## 2023-08-18 DIAGNOSIS — D64.9 ANEMIA, UNSPECIFIED TYPE: ICD-10-CM

## 2023-08-18 DIAGNOSIS — Z86.73 HISTORY OF STROKE: ICD-10-CM

## 2023-08-18 PROCEDURE — 3288F FALL RISK ASSESSMENT DOCD: CPT | Mod: CPTII,S$GLB,, | Performed by: INTERNAL MEDICINE

## 2023-08-18 PROCEDURE — 99999 PR PBB SHADOW E&M-EST. PATIENT-LVL V: CPT | Mod: PBBFAC,,, | Performed by: INTERNAL MEDICINE

## 2023-08-18 PROCEDURE — 1101F PR PT FALLS ASSESS DOC 0-1 FALLS W/OUT INJ PAST YR: ICD-10-PCS | Mod: CPTII,S$GLB,, | Performed by: INTERNAL MEDICINE

## 2023-08-18 PROCEDURE — 1160F RVW MEDS BY RX/DR IN RCRD: CPT | Mod: CPTII,S$GLB,, | Performed by: INTERNAL MEDICINE

## 2023-08-18 PROCEDURE — 99999 PR PBB SHADOW E&M-EST. PATIENT-LVL V: ICD-10-PCS | Mod: PBBFAC,,, | Performed by: INTERNAL MEDICINE

## 2023-08-18 PROCEDURE — 1126F AMNT PAIN NOTED NONE PRSNT: CPT | Mod: CPTII,S$GLB,, | Performed by: INTERNAL MEDICINE

## 2023-08-18 PROCEDURE — 3008F PR BODY MASS INDEX (BMI) DOCUMENTED: ICD-10-PCS | Mod: CPTII,S$GLB,, | Performed by: INTERNAL MEDICINE

## 2023-08-18 PROCEDURE — 3077F SYST BP >= 140 MM HG: CPT | Mod: CPTII,S$GLB,, | Performed by: INTERNAL MEDICINE

## 2023-08-18 PROCEDURE — 4010F ACE/ARB THERAPY RXD/TAKEN: CPT | Mod: CPTII,S$GLB,, | Performed by: INTERNAL MEDICINE

## 2023-08-18 PROCEDURE — 99204 PR OFFICE/OUTPT VISIT, NEW, LEVL IV, 45-59 MIN: ICD-10-PCS | Mod: S$GLB,,, | Performed by: INTERNAL MEDICINE

## 2023-08-18 PROCEDURE — 1159F MED LIST DOCD IN RCRD: CPT | Mod: CPTII,S$GLB,, | Performed by: INTERNAL MEDICINE

## 2023-08-18 PROCEDURE — 3077F PR MOST RECENT SYSTOLIC BLOOD PRESSURE >= 140 MM HG: ICD-10-PCS | Mod: CPTII,S$GLB,, | Performed by: INTERNAL MEDICINE

## 2023-08-18 PROCEDURE — 3079F PR MOST RECENT DIASTOLIC BLOOD PRESSURE 80-89 MM HG: ICD-10-PCS | Mod: CPTII,S$GLB,, | Performed by: INTERNAL MEDICINE

## 2023-08-18 PROCEDURE — 3008F BODY MASS INDEX DOCD: CPT | Mod: CPTII,S$GLB,, | Performed by: INTERNAL MEDICINE

## 2023-08-18 PROCEDURE — 1159F PR MEDICATION LIST DOCUMENTED IN MEDICAL RECORD: ICD-10-PCS | Mod: CPTII,S$GLB,, | Performed by: INTERNAL MEDICINE

## 2023-08-18 PROCEDURE — 3288F PR FALLS RISK ASSESSMENT DOCUMENTED: ICD-10-PCS | Mod: CPTII,S$GLB,, | Performed by: INTERNAL MEDICINE

## 2023-08-18 PROCEDURE — 3079F DIAST BP 80-89 MM HG: CPT | Mod: CPTII,S$GLB,, | Performed by: INTERNAL MEDICINE

## 2023-08-18 PROCEDURE — 1126F PR PAIN SEVERITY QUANTIFIED, NO PAIN PRESENT: ICD-10-PCS | Mod: CPTII,S$GLB,, | Performed by: INTERNAL MEDICINE

## 2023-08-18 PROCEDURE — 4010F PR ACE/ARB THEARPY RXD/TAKEN: ICD-10-PCS | Mod: CPTII,S$GLB,, | Performed by: INTERNAL MEDICINE

## 2023-08-18 PROCEDURE — 1160F PR REVIEW ALL MEDS BY PRESCRIBER/CLIN PHARMACIST DOCUMENTED: ICD-10-PCS | Mod: CPTII,S$GLB,, | Performed by: INTERNAL MEDICINE

## 2023-08-18 PROCEDURE — 99204 OFFICE O/P NEW MOD 45 MIN: CPT | Mod: S$GLB,,, | Performed by: INTERNAL MEDICINE

## 2023-08-18 PROCEDURE — 1101F PT FALLS ASSESS-DOCD LE1/YR: CPT | Mod: CPTII,S$GLB,, | Performed by: INTERNAL MEDICINE

## 2023-08-18 RX ORDER — METOPROLOL TARTRATE 50 MG/1
50 TABLET ORAL 2 TIMES DAILY
Qty: 180 TABLET | Refills: 3 | Status: SHIPPED | OUTPATIENT
Start: 2023-08-18

## 2023-08-18 RX ORDER — LOSARTAN POTASSIUM 100 MG/1
100 TABLET ORAL DAILY
Qty: 90 TABLET | Refills: 3 | Status: SHIPPED | OUTPATIENT
Start: 2023-08-18 | End: 2024-01-23

## 2023-08-18 NOTE — PROGRESS NOTES
[facility] Cardiology     Subjective:    Patient ID:  Soila Hairston is a 68 y.o. female who presents for evaluation of Shortness of Breath, Hypertension, Leg Swelling, Diabetes Mellitus, Cerebrovascular Accident, and Hyperlipidemia    Review of patient's allergies indicates:  No Known Allergies   She lives in [place].  She made the appointment to be seen for complaints of right leg swelling 3-4 months' duration.  She had a stroke in 2014 and has residual right-sided weakness.  She is a diabetic and takes insulin.  Her GFR is 51 range.  She lost her  in April.  Her blood pressures have been high at home.  She did have a remote echo years ago showing normal ejection fraction.    She is seen in a wheelchair today.  She has right leg weakness from her stroke.  She is a cigarette smoker.  Blood pressure today 160/85 mm Hg.  It does not look like she has a history of coronary artery disease.  Her daughter states that she did not have carotid disease diagnosed at time of stroke.        Review of Systems   Constitutional: Negative for chills, decreased appetite, diaphoresis, fever, malaise/fatigue, night sweats, weight gain and weight loss.   HENT:  Negative for congestion, ear discharge, ear pain, hearing loss, hoarse voice, nosebleeds, odynophagia, sore throat, stridor and tinnitus.    Eyes:  Negative for blurred vision, discharge, double vision, pain, photophobia, redness, vision loss in left eye, vision loss in right eye, visual disturbance and visual halos.   Cardiovascular:  Positive for dyspnea on exertion and leg swelling. Negative for chest pain, claudication, cyanosis, irregular heartbeat, near-syncope, orthopnea, palpitations, paroxysmal nocturnal dyspnea and syncope.   Respiratory:  Positive for cough and shortness of breath. Negative for hemoptysis, sleep disturbances due to breathing, snoring, sputum production and wheezing.   "  Endocrine: Negative for cold intolerance, heat intolerance, polydipsia, polyphagia and polyuria.   Hematologic/Lymphatic: Negative for adenopathy and bleeding problem. Does not bruise/bleed easily.   Skin:  Negative for color change, dry skin, flushing, itching, nail changes, poor wound healing, rash, skin cancer, suspicious lesions and unusual hair distribution.   Musculoskeletal:  Negative for arthritis, back pain, falls, gout, joint pain, joint swelling, muscle cramps, muscle weakness, myalgias, neck pain and stiffness.   Gastrointestinal:  Negative for bloating, abdominal pain, anorexia, change in bowel habit, bowel incontinence, constipation, diarrhea, dysphagia, excessive appetite, flatus, heartburn, hematemesis, hematochezia, hemorrhoids, jaundice, melena, nausea and vomiting.   Genitourinary:  Negative for bladder incontinence, decreased libido, dysuria, flank pain, frequency, genital sores, hematuria, hesitancy, incomplete emptying, nocturia and urgency.   Neurological:  Positive for focal weakness. Negative for aphonia, brief paralysis, difficulty with concentration, disturbances in coordination, excessive daytime sleepiness, dizziness, headaches, light-headedness, loss of balance, numbness, paresthesias, seizures, sensory change, tremors, vertigo and weakness.   Psychiatric/Behavioral:  Positive for depression. Negative for altered mental status, hallucinations, memory loss, substance abuse, suicidal ideas and thoughts of violence. The patient does not have insomnia and is not nervous/anxious.    Allergic/Immunologic: Negative for hives and persistent infections.        Objective:       Vitals:    08/18/23 1457   BP: (!) 166/84   BP Location: Right arm   Patient Position: Sitting   BP Method: Large (Automatic)   Pulse: 86   SpO2: 97%   Weight: 86.6 kg (190 lb 14.4 oz)   Height: 5' 2" (1.575 m)    Physical Exam  Constitutional:       General: She is not in acute distress.     Appearance: She is " well-developed. She is not diaphoretic.   HENT:      Head: Normocephalic and atraumatic.      Nose: Nose normal.   Eyes:      General: No scleral icterus.        Right eye: No discharge.      Conjunctiva/sclera: Conjunctivae normal.      Pupils: Pupils are equal, round, and reactive to light.   Neck:      Thyroid: No thyromegaly.      Vascular: No JVD.      Trachea: No tracheal deviation.   Cardiovascular:      Rate and Rhythm: Normal rate and regular rhythm.      Pulses:           Carotid pulses are 2+ on the right side and 2+ on the left side.       Radial pulses are 2+ on the right side and 2+ on the left side.        Dorsalis pedis pulses are 2+ on the right side and 2+ on the left side.        Posterior tibial pulses are 2+ on the right side and 2+ on the left side.      Heart sounds: Normal heart sounds. No murmur heard.     No friction rub. No gallop.   Pulmonary:      Effort: Pulmonary effort is normal. No respiratory distress.      Breath sounds: Normal breath sounds. No stridor. No wheezing or rales.   Chest:      Chest wall: No tenderness.   Abdominal:      General: Bowel sounds are normal. There is no distension.      Palpations: Abdomen is soft. There is no mass.      Tenderness: There is no abdominal tenderness. There is no guarding or rebound.   Musculoskeletal:         General: No tenderness. Normal range of motion.      Cervical back: Normal range of motion and neck supple.   Lymphadenopathy:      Cervical: No cervical adenopathy.   Skin:     General: Skin is warm and dry.      Coloration: Skin is not pale.      Findings: No erythema or rash.   Neurological:      Mental Status: She is alert and oriented to person, place, and time.      Cranial Nerves: No cranial nerve deficit.      Coordination: Coordination normal.      Comments: Right leg weakness   Psychiatric:         Behavior: Behavior normal.         Thought Content: Thought content normal.         Judgment: Judgment normal.            Assessment:       1. Primary hypertension    2. SOB (shortness of breath)    3. Leg swelling    4. Localized edema    5. Anemia, unspecified type    6. Cerebrovascular accident (CVA), unspecified mechanism    7. Essential hypertension    8. Hyperlipidemia, unspecified hyperlipidemia type    9. Stage 3a chronic kidney disease    10. Diabetes mellitus due to underlying condition with hyperosmolarity without coma, with long-term current use of insulin    11. History of stroke    12. Smoker    13. Class 1 obesity due to excess calories without serious comorbidity with body mass index (BMI) of 34.0 to 34.9 in adult    14. Aortic atherosclerosis    15. Coronary artery disease involving native coronary artery of native heart without angina pectoris      Results for orders placed or performed in visit on 08/16/22   Rapid BMT CBC with Diff   Result Value Ref Range    WBC 8.78 3.90 - 12.70 K/uL    RBC 2.72 (L) 4.00 - 5.40 M/uL    Hemoglobin 8.0 (L) 12.0 - 16.0 g/dL    Hematocrit 24.7 (L) 37.0 - 48.5 %    MCV 91 82 - 98 fL    MCH 29.4 27.0 - 31.0 pg    MCHC 32.4 32.0 - 36.0 g/dL    RDW 15.2 (H) 11.5 - 14.5 %    Platelets 179 150 - 450 K/uL    MPV 11.6 9.2 - 12.9 fL    Immature Granulocytes 0.6 (H) 0.0 - 0.5 %    Gran # (ANC) 5.2 1.8 - 7.7 K/uL    Immature Grans (Abs) 0.05 (H) 0.00 - 0.04 K/uL    Lymph # 3.1 1.0 - 4.8 K/uL    Mono # 0.4 0.3 - 1.0 K/uL    Eos # 0.1 0.0 - 0.5 K/uL    Baso # 0.01 0.00 - 0.20 K/uL    nRBC 0 0 /100 WBC    Gran % 59.3 38.0 - 73.0 %    Lymph % 34.7 18.0 - 48.0 %    Mono % 4.2 4.0 - 15.0 %    Eosinophil % 1.1 0.0 - 8.0 %    Basophil % 0.1 0.0 - 1.9 %    Differential Method Automated          Current Outpatient Medications:     alprazolam (XANAX) 2 MG Tab, Take 1 tablet (2 mg total) by mouth 2 (two) times daily as needed. Take 1/2 tablet by mouth two times daily as needed., Disp: 28 tablet, Rfl: 0    atorvastatin (LIPITOR) 10 MG tablet, Take 4 tablets (40 mg total) by mouth once daily., Disp: 30  "tablet, Rfl: 0    BD INSULIN PEN NEEDLE UF MINI 31 gauge x 3/16" Ndrey, , Disp: , Rfl:     BEVESPI AEROSPHERE 9-4.8 mcg HFAA, , Disp: , Rfl:     clopidogreL (PLAVIX) 75 mg tablet, Take 1 tablet (75 mg total) by mouth once daily., Disp: 30 tablet, Rfl: 0    ergocalciferol (ERGOCALCIFEROL) 50,000 unit Cap, Take 50,000 Units by mouth every 7 days., Disp: , Rfl:     ferrous gluconate (FERGON) 324 MG tablet, Take 324 mg by mouth daily with breakfast., Disp: , Rfl:     glimepiride (AMARYL) 2 MG tablet, glimepiride 2 mg tablet bid, Disp: , Rfl:     hydrochlorothiazide (HYDRODIURIL) 25 MG tablet, Take 1 tablet (25 mg total) by mouth once daily., Disp: 30 tablet, Rfl: 3    LANTUS SOLOSTAR 100 unit/mL (3 mL) InPn pen, Inject 40 Units into the skin once daily., Disp: 1 Box, Rfl: 3    levocetirizine (XYZAL) 5 MG tablet, Take 5 mg by mouth every evening., Disp: , Rfl:     losartan (COZAAR) 100 MG tablet, Take 1 tablet (100 mg total) by mouth once daily., Disp: 90 tablet, Rfl: 3    metoprolol tartrate (LOPRESSOR) 50 MG tablet, Take 1 tablet (50 mg total) by mouth 2 (two) times daily., Disp: 180 tablet, Rfl: 3    mirabegron (MYRBETRIQ) 50 mg Tb24, Take 1 tablet by mouth once daily., Disp: , Rfl:     oxyCODONE-acetaminophen (PERCOCET)  mg per tablet, Take 1 tablet by mouth every 4 (four) hours as needed for Pain., Disp: 18 tablet, Rfl: 0    pantoprazole (PROTONIX) 40 MG tablet, TAKE 1 TABLET EVERY DAY, Disp: 90 tablet, Rfl: 3    TRUETEST TEST STRIPS Strp, , Disp: , Rfl:      Lab Results   Component Value Date    WBC 8.78 08/16/2022    RBC 2.72 (L) 08/16/2022    HGB 8.0 (L) 08/16/2022    HCT 24.7 (L) 08/16/2022    MCV 91 08/16/2022    MCH 29.4 08/16/2022    MCHC 32.4 08/16/2022    RDW 15.2 (H) 08/16/2022     08/16/2022    MPV 11.6 08/16/2022    GRAN 5.2 08/16/2022    GRAN 59.3 08/16/2022    LYMPH 3.1 08/16/2022    LYMPH 34.7 08/16/2022    MONO 0.4 08/16/2022    MONO 4.2 08/16/2022    EOS 0.1 08/16/2022    BASO 0.01 " "08/16/2022    EOSINOPHIL 1.1 08/16/2022    BASOPHIL 0.1 08/16/2022        CMP  Lab Results   Component Value Date     06/04/2022    K 4.8 06/04/2022     (H) 06/04/2022    CO2 23 06/04/2022     (H) 06/04/2022    BUN 24 (H) 06/04/2022    CREATININE 1.3 07/20/2022    CALCIUM 8.6 (L) 06/04/2022    PROT 7.3 06/04/2022    ALBUMIN 3.3 (L) 06/04/2022    BILITOT 0.4 06/04/2022    ALKPHOS 128 (H) 06/04/2022    AST 28 06/04/2022    ALT 16 06/04/2022    ANIONGAP 7 (L) 06/04/2022    ESTGFRAFRICA 48.7 (A) 07/20/2022    EGFRNONAA 42.3 (A) 07/20/2022        No results found for: "LABBLOO", "LABURIN", "RESPIRATORYC", "GSRESP"         Results for orders placed or performed during the hospital encounter of 06/04/22   ECG 12 lead    Collection Time: 06/04/22 12:11 PM    Narrative    Test Reason : I63.9,    Vent. Rate : 092 BPM     Atrial Rate : 092 BPM     P-R Int : 170 ms          QRS Dur : 082 ms      QT Int : 396 ms       P-R-T Axes : 067 038 079 degrees     QTc Int : 489 ms    Normal sinus rhythm  Normal ECG  When compared with ECG of 15-MAR-2022 11:15,  No significant change was found  Confirmed by Ricardo SOFIA MD, Kaleb AGUILAR ([id number]) on 6/6/2022 8:43:38 AM    Referred By: ALAINA MERAZ           Confirmed By:Kaleb Silva III, MD                  Plan:       Problem List Items Addressed This Visit          Neuro    History of stroke     A 2014 event.  She has residual right leg weakness.  She is on chronic Plavix.  She has done well since her stroke.            Cardiac/Vascular    Essential hypertension     She monitors at home and documents elevated readings.  Lopressor increased to b.i.d. dosing and losartan increased from  mg daily.  Condition not ideally controlled.         HLD (hyperlipidemia)     Atorvastatin 10 mg utilized.  Most recent LDL 90 mg% by labs March 2022.  She has had previous stroke.  Increased dosing to be considered on next visit.         Aortic atherosclerosis     Noted on imaging " studies.  Condition stable.         Coronary artery disease involving native coronary artery of native heart without angina pectoris     Coronary calcifications noted on imaging studies of the chest.  No active angina.  She is very sedentary.  She is on Plavix.            Renal/    Stage 3a chronic kidney disease     GFR 51, serum creatinine 1.24, condition stable.            Endocrine    DM (diabetes mellitus)     She is taking insulin.  Condition unchanged.         Class 1 obesity due to excess calories without serious comorbidity in adult     Weight loss encouraged.            Other    Smoker     Smoking cessation advised.          Other Visit Diagnoses       Primary hypertension    -  Primary    Relevant Medications    losartan (COZAAR) 100 MG tablet    SOB (shortness of breath)        Relevant Orders    Echo    Leg swelling        Relevant Medications    metoprolol tartrate (LOPRESSOR) 50 MG tablet    Other Relevant Orders    US Lower Extremity Veins Bilateral    Localized edema        Relevant Orders    US Lower Extremity Veins Bilateral    Anemia, unspecified type        Relevant Orders    CBC Auto Differential    Cerebrovascular accident (CVA), unspecified mechanism                 I ordered a lower extremity venous ultrasound study and cardiac echo.  She is complaining of shortness of breath.  She has significant right leg swelling for months.    I increased both Lopressor to 50 mg b.i.d. and increased losartan to 100 mg daily.    I asked the patient to return in 3 weeks.  Her LDL is 90 range, likely I will advise increased dosing on next visit.               Kian Bae MD  08/18/2023   3:25 PM

## 2023-08-18 NOTE — ASSESSMENT & PLAN NOTE
She monitors at home and documents elevated readings.  Lopressor increased to b.i.d. dosing and losartan increased from  mg daily.  Condition not ideally controlled.

## 2023-08-18 NOTE — ASSESSMENT & PLAN NOTE
Atorvastatin 10 mg utilized.  Most recent LDL 90 mg% by labs March 2022.  She has had previous stroke.  Increased dosing to be considered on next visit.

## 2023-08-18 NOTE — ASSESSMENT & PLAN NOTE
A 2014 event.  She has residual right leg weakness.  She is on chronic Plavix.  She has done well since her stroke.

## 2023-08-18 NOTE — ASSESSMENT & PLAN NOTE
Coronary calcifications noted on imaging studies of the chest.  No active angina.  She is very sedentary.  She is on Plavix.

## 2023-08-21 ENCOUNTER — TELEPHONE (OUTPATIENT)
Dept: HEMATOLOGY/ONCOLOGY | Facility: CLINIC | Age: 68
End: 2023-08-21
Payer: MEDICARE

## 2023-08-21 ENCOUNTER — TELEPHONE (OUTPATIENT)
Dept: CARDIOLOGY | Facility: CLINIC | Age: 68
End: 2023-08-21
Payer: MEDICARE

## 2023-08-21 DIAGNOSIS — D47.2 MGUS (MONOCLONAL GAMMOPATHY OF UNKNOWN SIGNIFICANCE): Primary | ICD-10-CM

## 2023-08-21 DIAGNOSIS — D64.9 CHRONIC ANEMIA: ICD-10-CM

## 2023-08-21 NOTE — TELEPHONE ENCOUNTER
----- Message from Shante Wilkins RN sent at 8/21/2023  8:23 AM CDT -----    ----- Message -----  From: Gabi Villarreal  Sent: 8/18/2023   4:36 PM CDT  To: Lorin Reid ([facility]) Staff    Type:  Needs Medical Advice    Who Called: pt daughter  Would the patient rather a call back or a response via [facility]? call  Best Call Back Number: [phone]  Additional Information: would like to discuss blood levels being low worried due to losing father the same way would like to know what to do

## 2023-08-21 NOTE — TELEPHONE ENCOUNTER
----- Message from Gabi Villarreal sent at 8/18/2023  4:32 PM CDT -----  Type:  Needs Medical Advice    Who Called: pt daughter  Would the patient rather a call back or a response via [facility]? call  Best Call Back Number: [phone]  Additional Information: pt would like to speak regarding results blood levels low

## 2023-09-26 ENCOUNTER — PATIENT MESSAGE (OUTPATIENT)
Dept: CARDIOLOGY | Facility: CLINIC | Age: 68
End: 2023-09-26
Payer: MEDICARE

## 2023-09-26 ENCOUNTER — HOSPITAL ENCOUNTER (OUTPATIENT)
Dept: CARDIOLOGY | Facility: HOSPITAL | Age: 68
Discharge: HOME | End: 2023-09-26
Attending: INTERNAL MEDICINE
Payer: MEDICARE

## 2023-09-26 ENCOUNTER — HOSPITAL ENCOUNTER (OUTPATIENT)
Dept: RADIOLOGY | Facility: HOSPITAL | Age: 68
Discharge: HOME | End: 2023-09-26
Attending: INTERNAL MEDICINE
Payer: MEDICARE

## 2023-09-26 ENCOUNTER — PATIENT MESSAGE (OUTPATIENT)
Dept: HEMATOLOGY/ONCOLOGY | Facility: CLINIC | Age: 68
End: 2023-09-26
Payer: MEDICARE

## 2023-09-26 VITALS — BODY MASS INDEX: 34.96 KG/M2 | WEIGHT: 190 LBS | HEIGHT: 62 IN

## 2023-09-26 DIAGNOSIS — R60.0 LOCALIZED EDEMA: ICD-10-CM

## 2023-09-26 DIAGNOSIS — M79.89 LEG SWELLING: ICD-10-CM

## 2023-09-26 DIAGNOSIS — R06.02 SOB (SHORTNESS OF BREATH): ICD-10-CM

## 2023-09-26 LAB
ASCENDING AORTA: 2.92 CM
AV INDEX (PROSTH): 1
AV MEAN GRADIENT: 4 MMHG
AV PEAK GRADIENT: 7 MMHG
AV VALVE AREA BY VELOCITY RATIO: 2.11 CM²
AV VALVE AREA: 2.91 CM²
AV VELOCITY RATIO: 0.73
BSA FOR ECHO PROCEDURE: 1.94 M2
CV ECHO LV RWT: 0.38 CM
DOP CALC AO PEAK VEL: 1.36 M/S
DOP CALC AO VTI: 23.9 CM
DOP CALC LVOT AREA: 2.9 CM2
DOP CALC LVOT DIAMETER: 1.92 CM
DOP CALC LVOT PEAK VEL: 0.99 M/S
DOP CALC LVOT STROKE VOLUME: 69.45 CM3
DOP CALC MV VTI: 24.3 CM
DOP CALCLVOT PEAK VEL VTI: 24 CM
E WAVE DECELERATION TIME: 150.13 MSEC
E/A RATIO: 0.6
E/E' RATIO: 17.11 M/S
ECHO LV POSTERIOR WALL: 0.88 CM (ref 0.6–1.1)
EJECTION FRACTION: 55 %
FRACTIONAL SHORTENING: 37 % (ref 28–44)
INTERVENTRICULAR SEPTUM: 0.67 CM (ref 0.6–1.1)
IVC DIAMETER: 1.46 CM
IVRT: 87.54 MSEC
LA MAJOR: 5.42 CM
LA WIDTH: 3.9 CM
LEFT ATRIUM SIZE: 3.33 CM
LEFT INTERNAL DIMENSION IN SYSTOLE: 2.96 CM (ref 2.1–4)
LEFT VENTRICLE DIASTOLIC VOLUME INDEX: 54.42 ML/M2
LEFT VENTRICLE DIASTOLIC VOLUME: 101.77 ML
LEFT VENTRICLE MASS INDEX: 63 G/M2
LEFT VENTRICLE SYSTOLIC VOLUME INDEX: 18.1 ML/M2
LEFT VENTRICLE SYSTOLIC VOLUME: 33.9 ML
LEFT VENTRICULAR INTERNAL DIMENSION IN DIASTOLE: 4.69 CM (ref 3.5–6)
LEFT VENTRICULAR MASS: 116.92 G
LV LATERAL E/E' RATIO: 15.4 M/S
LV SEPTAL E/E' RATIO: 19.25 M/S
LVOT MG: 2.38 MMHG
LVOT MV: 0.75 CM/S
MV A" WAVE DURATION": 106.57 MSEC
MV MEAN GRADIENT: 2 MMHG
MV PEAK A VEL: 1.28 M/S
MV PEAK E VEL: 0.77 M/S
MV PEAK GRADIENT: 6 MMHG
MV STENOSIS PRESSURE HALF TIME: 43.54 MS
MV VALVE AREA BY CONTINUITY EQUATION: 2.86 CM2
MV VALVE AREA P 1/2 METHOD: 5.05 CM2
PISA MRMAX VEL: 4.89 M/S
PISA TR MAX VEL: 2.73 M/S
PULM VEIN S/D RATIO: 2.65
PV PEAK D VEL: 0.26 M/S
PV PEAK GRADIENT: 3 MMHG
PV PEAK S VEL: 0.69 M/S
PV PEAK VELOCITY: 0.92 M/S
RA MAJOR: 4.65 CM
RA PRESSURE ESTIMATED: 3 MMHG
RA WIDTH: 3.7 CM
RIGHT VENTRICULAR END-DIASTOLIC DIMENSION: 2.68 CM
RV TB RVSP: 6 MMHG
RV TISSUE DOPPLER FREE WALL SYSTOLIC VELOCITY 1 (APICAL 4 CHAMBER VIEW): 8.19 CM/S
SINUS: 2.69 CM
STJ: 2.32 CM
TDI LATERAL: 0.05 M/S
TDI SEPTAL: 0.04 M/S
TDI: 0.05 M/S
TR MAX PG: 30 MMHG
TRICUSPID ANNULAR PLANE SYSTOLIC EXCURSION: 2.04 CM
TV REST PULMONARY ARTERY PRESSURE: 33 MMHG
Z-SCORE OF LEFT VENTRICULAR DIMENSION IN END DIASTOLE: -1.06
Z-SCORE OF LEFT VENTRICULAR DIMENSION IN END SYSTOLE: -0.65

## 2023-09-26 PROCEDURE — 93306 ECHO (CUPID ONLY): ICD-10-PCS | Mod: 26,,, | Performed by: INTERNAL MEDICINE

## 2023-09-26 PROCEDURE — 93306 TTE W/DOPPLER COMPLETE: CPT | Mod: PN

## 2023-09-26 PROCEDURE — 93306 TTE W/DOPPLER COMPLETE: CPT | Mod: 26,,, | Performed by: INTERNAL MEDICINE

## 2023-09-26 PROCEDURE — 93970 EXTREMITY STUDY: CPT | Mod: TC,PN

## 2023-09-26 PROCEDURE — 93970 US LOWER EXTREMITY VEINS BILATERAL: ICD-10-PCS | Mod: 26,,, | Performed by: RADIOLOGY

## 2023-09-26 PROCEDURE — 93970 EXTREMITY STUDY: CPT | Mod: 26,,, | Performed by: RADIOLOGY

## 2023-09-27 NOTE — TELEPHONE ENCOUNTER
Please let her know that her Hemoglobin and iron levels have remained the same over the past year. She needs to come in for a follow up visit for a full assessment. She does not need any blood or iron based on lab review. Thanks

## 2023-09-27 NOTE — TELEPHONE ENCOUNTER
Talked to her. She needs to follow up with Dr. Granados or his NP, please send to his team. Thanks

## 2023-09-28 DIAGNOSIS — R79.9 ABNORMAL FINDING OF BLOOD CHEMISTRY, UNSPECIFIED: ICD-10-CM

## 2023-09-28 DIAGNOSIS — D47.2 MGUS (MONOCLONAL GAMMOPATHY OF UNKNOWN SIGNIFICANCE): Primary | ICD-10-CM

## 2023-12-21 ENCOUNTER — TELEPHONE (OUTPATIENT)
Dept: UROLOGY | Facility: CLINIC | Age: 68
End: 2023-12-21
Payer: MEDICARE

## 2023-12-29 ENCOUNTER — TELEPHONE (OUTPATIENT)
Dept: NEPHROLOGY | Facility: CLINIC | Age: 68
End: 2023-12-29
Payer: MEDICARE

## 2023-12-29 NOTE — TELEPHONE ENCOUNTER
Spoke to pt daughter and stated that she was in the middle of something and would have to call back     ----- Message from Lakisha Manzano sent at 12/29/2023  9:24 AM CST -----  Regarding: sooner appt request  Contact: [phone]  Pt has been diagnosed with chronic kidney disease and was instructed by her dr to schedule an appt asap. Pt declined the first available appts and is requesting to be seen sooner. Please give her a call back soon.

## 2024-01-22 DIAGNOSIS — I10 PRIMARY HYPERTENSION: ICD-10-CM

## 2024-01-23 RX ORDER — LOSARTAN POTASSIUM 100 MG/1
100 TABLET ORAL DAILY
Qty: 90 TABLET | Refills: 3 | Status: SHIPPED | OUTPATIENT
Start: 2024-01-23

## 2024-01-23 RX ORDER — METOPROLOL SUCCINATE 100 MG/1
100 TABLET, EXTENDED RELEASE ORAL DAILY
Qty: 90 TABLET | Refills: 3 | Status: SHIPPED | OUTPATIENT
Start: 2024-01-23

## 2024-03-04 ENCOUNTER — TELEPHONE (OUTPATIENT)
Dept: NEPHROLOGY | Facility: CLINIC | Age: 69
End: 2024-03-04
Payer: MEDICARE

## 2024-03-05 ENCOUNTER — TELEPHONE (OUTPATIENT)
Dept: NEPHROLOGY | Facility: CLINIC | Age: 69
End: 2024-03-05
Payer: MEDICARE

## 2024-03-05 DIAGNOSIS — N18.31 STAGE 3A CHRONIC KIDNEY DISEASE: Primary | ICD-10-CM

## 2024-03-11 ENCOUNTER — OFFICE VISIT (OUTPATIENT)
Dept: NEPHROLOGY | Facility: CLINIC | Age: 69
End: 2024-03-11
Payer: MEDICARE

## 2024-03-11 VITALS
SYSTOLIC BLOOD PRESSURE: 145 MMHG | HEART RATE: 77 BPM | DIASTOLIC BLOOD PRESSURE: 73 MMHG | WEIGHT: 186.31 LBS | BODY MASS INDEX: 34.07 KG/M2 | OXYGEN SATURATION: 98 %

## 2024-03-11 DIAGNOSIS — N18.4 ANEMIA IN STAGE 4 CHRONIC KIDNEY DISEASE: ICD-10-CM

## 2024-03-11 DIAGNOSIS — N17.9 ACUTE KIDNEY INJURY: ICD-10-CM

## 2024-03-11 DIAGNOSIS — R79.89 SERUM CREATININE RAISED: ICD-10-CM

## 2024-03-11 DIAGNOSIS — I10 HYPERTENSION, UNSPECIFIED TYPE: ICD-10-CM

## 2024-03-11 DIAGNOSIS — E11.21 TYPE 2 DIABETES MELLITUS WITH DIABETIC NEPHROPATHY, UNSPECIFIED WHETHER LONG TERM INSULIN USE: ICD-10-CM

## 2024-03-11 DIAGNOSIS — N18.4 CHRONIC KIDNEY DISEASE (CKD), STAGE IV (SEVERE): Primary | ICD-10-CM

## 2024-03-11 DIAGNOSIS — D63.1 ANEMIA IN STAGE 4 CHRONIC KIDNEY DISEASE: ICD-10-CM

## 2024-03-11 PROBLEM — N18.31 STAGE 3A CHRONIC KIDNEY DISEASE: Status: RESOLVED | Noted: 2022-09-02 | Resolved: 2024-03-11

## 2024-03-11 PROCEDURE — 1126F AMNT PAIN NOTED NONE PRSNT: CPT | Mod: CPTII,S$GLB,, | Performed by: INTERNAL MEDICINE

## 2024-03-11 PROCEDURE — 3078F DIAST BP <80 MM HG: CPT | Mod: CPTII,S$GLB,, | Performed by: INTERNAL MEDICINE

## 2024-03-11 PROCEDURE — 1160F RVW MEDS BY RX/DR IN RCRD: CPT | Mod: CPTII,S$GLB,, | Performed by: INTERNAL MEDICINE

## 2024-03-11 PROCEDURE — 3066F NEPHROPATHY DOC TX: CPT | Mod: CPTII,S$GLB,, | Performed by: INTERNAL MEDICINE

## 2024-03-11 PROCEDURE — 3008F BODY MASS INDEX DOCD: CPT | Mod: CPTII,S$GLB,, | Performed by: INTERNAL MEDICINE

## 2024-03-11 PROCEDURE — 4010F ACE/ARB THERAPY RXD/TAKEN: CPT | Mod: CPTII,S$GLB,, | Performed by: INTERNAL MEDICINE

## 2024-03-11 PROCEDURE — 99999 PR PBB SHADOW E&M-EST. PATIENT-LVL IV: CPT | Mod: PBBFAC,,, | Performed by: INTERNAL MEDICINE

## 2024-03-11 PROCEDURE — 3077F SYST BP >= 140 MM HG: CPT | Mod: CPTII,S$GLB,, | Performed by: INTERNAL MEDICINE

## 2024-03-11 PROCEDURE — 99204 OFFICE O/P NEW MOD 45 MIN: CPT | Mod: S$GLB,,, | Performed by: INTERNAL MEDICINE

## 2024-03-11 PROCEDURE — 1101F PT FALLS ASSESS-DOCD LE1/YR: CPT | Mod: CPTII,S$GLB,, | Performed by: INTERNAL MEDICINE

## 2024-03-11 PROCEDURE — 3288F FALL RISK ASSESSMENT DOCD: CPT | Mod: CPTII,S$GLB,, | Performed by: INTERNAL MEDICINE

## 2024-03-11 PROCEDURE — 1159F MED LIST DOCD IN RCRD: CPT | Mod: CPTII,S$GLB,, | Performed by: INTERNAL MEDICINE

## 2024-03-11 RX ORDER — ALLOPURINOL 100 MG/1
100 TABLET ORAL DAILY
COMMUNITY

## 2024-03-11 RX ORDER — GLIPIZIDE 5 MG/1
5 TABLET, FILM COATED, EXTENDED RELEASE ORAL
COMMUNITY

## 2024-03-11 RX ORDER — SIMVASTATIN 40 MG/1
40 TABLET, FILM COATED ORAL NIGHTLY
COMMUNITY

## 2024-03-11 NOTE — PROGRESS NOTES
HPI  This 68 y.o. y/o female with PMH of HTN, HLD, type 2 DM, CVA, CKD, MGUS (monoclonal gammopathy of unknown significance) came in for CKD.    Renal history  Creatinine   Date Value Ref Range Status   03/05/2024 2.04 (H) 0.50 - 1.40 mg/dL Final   03/05/2024 2.04 (H) 0.50 - 1.40 mg/dL Final   07/20/2022 1.3 0.5 - 1.4 mg/dL Final   06/04/2022 1.24 0.50 - 1.40 mg/dL Final   05/11/2022 1.30 (H) 0.57 - 1.25 mg/dL Final   05/11/2022 1.35 (H) 0.57 - 1.25 mg/dL Final   05/10/2022 1.38 (H) 0.57 - 1.25 mg/dL Final   03/15/2022 1.17 0.50 - 1.40 mg/dL Final   11/15/2021 1.37 0.50 - 1.40 mg/dL Final   06/04/2021 1.17 0.50 - 1.20 mg/dL Final   02/23/2021 1.20 0.50 - 1.20 mg/dL Final   03/04/2016 1.0 0.5 - 1.4 mg/dL Final   Cr 2.44 2/29/2024 per the PCP office  Cr 1.1-1.4 1242-8859  Prot/Creat Ratio, Urine   Date Value Ref Range Status   03/05/2024 0.54 (H) 0.00 - 0.20 Final   03/15/2022 1.63 (H) 0.00 - 0.20 Final   UA showed protein > 3+ since 2021  Has been drinking 60 oz of fluid a day (30 oz of cup x2), constantly feel thirsty  Not taking NSAIDs    HTN  BP this visit 145/73 mmHg  BP at home 140/70 mmHg  Current medication: metoprolol 100 mg, losartan 100 mg daily, stopped HCTZ 25 mg daily by her PCP likely due to RAJINDER iso hyperglycemia with polyuria  Not compliant with low salt diet    Type 2 DM  Lab Results   Component Value Date    HGBA1C 7.1 (H) 11/15/2021   HbA1c 9.7 2/29/2024  Not on metformin  With insulin therapy    Past Medical History:   Diagnosis Date    Anticoagulant long-term use     Colitis     Colon polyp     Depression     Diverticulosis of colon     Foot drop     a couple weeks ago    Hemorrhoids     SOB (shortness of breath)        Past Surgical History:   Procedure Laterality Date    ABSCESS DRAINAGE Right     breast    COLONOSCOPY N/A 10/18/2019    Procedure: COLONOSCOPY2 day robel;  Surgeon: Adryan Schultz MD;  [facility];  Service: General;  Laterality: N/A;    EPIDURAL STEROID INJECTION       ESOPHAGOGASTRODUODENOSCOPY N/A 10/18/2019    Procedure: EGD (ESOPHAGOGASTRODUODENOSCOPY);  Surgeon: Adryan Schultz MD;  Location: [facility];  Service: General;  Laterality: N/A;    TRANSFORAMINAL EPIDURAL INJECTION OF STEROID Left 2/20/2019    Procedure: INJECTION, STEROID, EPIDURAL, TRANSFORAMINAL APPROACH [3999];  Surgeon: Ad Patel III, MD;  Location: [facility];  Service: Pain Management;  Laterality: Left;  L5/S1       Review of patient's allergies indicates:  No Known Allergies      Social History     Socioeconomic History    Marital status:    Tobacco Use    Smoking status: Some Days     Current packs/day: 0.25     Average packs/day: 0.3 packs/day for 40.0 years (10.0 ttl pk-yrs)     Types: Cigarettes    Smokeless tobacco: Never    Tobacco comments:     smoke 5 cigs a day   Substance and Sexual Activity    Alcohol use: No     Alcohol/week: 0.0 standard drinks of alcohol    Drug use: No    Sexual activity: Not Currently     Social Determinants of Health     Financial Resource Strain: Medium Risk (2/29/2024)    Overall Financial Resource Strain (CARDIA)     Difficulty of Paying Living Expenses: Somewhat hard   Food Insecurity: No Food Insecurity (2/29/2024)    Hunger Vital Sign     Worried About Running Out of Food in the Last Year: Never true     Ran Out of Food in the Last Year: Never true   Transportation Needs: No Transportation Needs (2/29/2024)    PRAPARE - Transportation     Lack of Transportation (Medical): No     Lack of Transportation (Non-Medical): No   Physical Activity: Insufficiently Active (2/29/2024)    Exercise Vital Sign     Days of Exercise per Week: 3 days     Minutes of Exercise per Session: 20 min   Stress: Stress Concern Present (2/29/2024)    [employer] [place] [employer] - Occupational Stress Questionnaire     Feeling of Stress : To some extent   Social Connections: Unknown (2/29/2024)    Social Connection and Isolation Panel [NHANES]     Frequency of  Communication with Friends and Family: More than three times a week     Frequency of Social Gatherings with Friends and Family: Once a week     Active Member of Clubs or Organizations: No     Attends Club or Organization Meetings: Never     Marital Status:    Housing Stability: Low Risk  (2/29/2024)    Housing Stability Vital Sign     Unable to Pay for Housing in the Last Year: No     Number of Places Lived in the Last Year: 1     Unstable Housing in the Last Year: No       Family History   Problem Relation Age of Onset    Dementia Mother     Breast cancer Mother        ROS negative except listed above    PHYSICAL EXAMINATION:  Blood pressure (!) 145/73, pulse 77, weight 84.5 kg (186 lb 4.6 oz), SpO2 98 %.  Constitutional - No acute distress  HEENT - Grossly normal  Neck - supple.   Cardiovascular - JVP normal  Respiratory - Clear  Musculoskeletal - Peripheral edema no  Dermatologic/Skin - Skin warm and dry.  No rashes.    Neurologic - No acute neurological deficit  Psychiatric - AAOx3    Assessment and Plan  1. RAJINDER on CKD Stage 3B-4:     Urine Protein Creatinine Ratios:    Prot/Creat Ratio, Urine   Date Value Ref Range Status   03/05/2024 0.54 (H) 0.00 - 0.20 Final   03/15/2022 1.63 (H) 0.00 - 0.20 Final         Acid-Base:   Lab Results   Component Value Date     (H) 03/05/2024     (H) 03/05/2024    K 4.3 03/05/2024    K 4.3 03/05/2024    CO2 22 (L) 03/05/2024    CO2 22 (L) 03/05/2024     -Counseled to avoid NSAIDs  -Counseled to drink 50-55 oz a day  -Counseled for sugar control as hyperglycemia can cause RAJINDER due to volume depletion  -Will check SPEP, SFLC, quantitated immunoglobulins, DIXIE, ds DNA, C3, C4, hep B/C, UACR, UPCR, RP US  -No urinary symptoms, will repeat UA     2. HTN: BP goal 130/80 mmHg  -Counseled for low salt diet  -Continue metoprolol 100 mg, losartan 100 mg daily  -Continue to hold HCTZ    3. Bone mineral condition:   Lab Results   Component Value Date    CALCIUM 8.5 (L)  "03/05/2024    CALCIUM 8.5 (L) 03/05/2024    PHOS 4.3 03/05/2024    PHOS 4.3 03/05/2024     No results found for: "[id number]", "VITDHYDROX"   Will continue to monitor    4. Anemia:   Lab Results   Component Value Date    HGB 8.6 (L) 03/05/2024    FERRITIN 760 (H) 09/26/2023    IRON 74 09/26/2023    TRANSFERRIN 162 (L) 09/26/2023    TIBC 240 (L) 09/26/2023    FESATURATED 31 09/26/2023      5. Type 2 DM:  Last HbA1C   Lab Results   Component Value Date    HGBA1C 7.1 (H) 11/15/2021     Counseled the patient regarding the importance of sugar control to slow CKD progression     6. Lipid management:   Lab Results   Component Value Date    LDLCALC 90.2 03/15/2022   Continue statin    ESRD planing when eGFR < 15   Will consider txp referral when eGFR < 20    Total time spent 45 min    Follow up in 3 weeks    "

## 2024-03-25 ENCOUNTER — PATIENT MESSAGE (OUTPATIENT)
Dept: HEMATOLOGY/ONCOLOGY | Facility: CLINIC | Age: 69
End: 2024-03-25
Payer: MEDICARE

## 2024-04-01 ENCOUNTER — TELEPHONE (OUTPATIENT)
Dept: NEPHROLOGY | Facility: CLINIC | Age: 69
End: 2024-04-01
Payer: MEDICARE

## 2024-04-15 ENCOUNTER — TELEPHONE (OUTPATIENT)
Dept: NEPHROLOGY | Facility: CLINIC | Age: 69
End: 2024-04-15
Payer: MEDICARE

## 2024-04-15 DIAGNOSIS — R10.9 FLANK PAIN: ICD-10-CM

## 2024-04-15 DIAGNOSIS — M79.89 LEG SWELLING: Primary | ICD-10-CM

## 2024-04-15 RX ORDER — HYDROCHLOROTHIAZIDE 12.5 MG/1
12.5 TABLET ORAL DAILY
Qty: 90 TABLET | Refills: 3 | Status: SHIPPED | OUTPATIENT
Start: 2024-04-15 | End: 2025-04-15

## 2024-04-15 NOTE — PROGRESS NOTES
Reported legs swelling and flank pain. Will resume HCTZ 12.5 mg daily. Reported Cr 1.5, albumin 3.4 at OSH within the past 2-3 days per her daughter. Will check CBC, CMP, urine analysis and culture. No stone on the US in March 2024. Advised to go to the ER if intractable pain noted.    Also reported hypoglycemia. Advised to drink some beverage with sugar. If the number is persistently low, needs to go to the ER. Advised to discuss with PCP too.

## 2024-04-29 RX ORDER — PANTOPRAZOLE SODIUM 40 MG/1
TABLET, DELAYED RELEASE ORAL
Qty: 90 TABLET | Refills: 3 | Status: SHIPPED | OUTPATIENT
Start: 2024-04-29

## 2024-05-08 NOTE — PROGRESS NOTES
HPI  This 69 y.o. y/o female with PMH of HTN, HLD, type 2 DM, CVA, CKD, MGUS (monoclonal gammopathy of unknown significance) came in for CKD.    Renal history  Creatinine   Date Value Ref Range Status   05/09/2024 2.1 (H) 0.5 - 1.4 mg/dL Final   05/09/2024 2.1 (H) 0.5 - 1.4 mg/dL Final   03/05/2024 2.04 (H) 0.50 - 1.40 mg/dL Final   03/05/2024 2.04 (H) 0.50 - 1.40 mg/dL Final   07/20/2022 1.3 0.5 - 1.4 mg/dL Final   06/04/2022 1.24 0.50 - 1.40 mg/dL Final   05/11/2022 1.30 (H) 0.57 - 1.25 mg/dL Final   05/11/2022 1.35 (H) 0.57 - 1.25 mg/dL Final   05/10/2022 1.38 (H) 0.57 - 1.25 mg/dL Final   03/15/2022 1.17 0.50 - 1.40 mg/dL Final   11/15/2021 1.37 0.50 - 1.40 mg/dL Final   06/04/2021 1.17 0.50 - 1.20 mg/dL Final   02/23/2021 1.20 0.50 - 1.20 mg/dL Final   03/04/2016 1.0 0.5 - 1.4 mg/dL Final   Cr 2.44 2/29/2024 per the PCP office  Cr 1.1-1.4 6133-8102  Prot/Creat Ratio, Urine   Date Value Ref Range Status   03/25/2024 1.98 (H) 0.00 - 0.20 Final   03/05/2024 0.54 (H) 0.00 - 0.20 Final   UA showed protein > 3+ since 2021 03/2024 High IgG is still noted 1763, K/L ratio 4.3, UACR 1.5, UPCR 1.98 (ratio 75%)  SPEP, DIXIE, ds DNA, C3, C4, hep B/C neg   03/2024 RP US showed medical renal disease  Has been drinking 60 oz of fluid a day (30 oz of cup x2), constantly feel thirsty  Not taking NSAIDs    HTN  BP this visit 151/79 mmHg (rush to come here)  BP at home not checking  Current medication: metoprolol 100 mg, losartan 100 mg daily, stopped HCTZ 25 mg daily by her PCP  Not compliant with low salt diet    Type 2 DM  Lab Results   Component Value Date    HGBA1C 7.1 (H) 11/15/2021   HbA1c 9.7 2/29/2024  Not on metformin  With insulin therapy    RP US 03/2024  FINDINGS:  The right and left kidneys measure 10 and 10.6 cm respectively.  The right and left resistive indices measure 0.84 and 0.79 which is elevated consistent with medical renal disease.  There is no hydronephrosis or nephrolithiasis.  The bladder is decompressed  and poorly evaluated.     Impression:     Elevated resistive indices consistent medical renal disease.    Past Medical History:   Diagnosis Date    Anticoagulant long-term use     Colitis     Colon polyp     Depression     Diverticulosis of colon     Foot drop     a couple weeks ago    Hemorrhoids     SOB (shortness of breath)        Past Surgical History:   Procedure Laterality Date    ABSCESS DRAINAGE Right     breast    COLONOSCOPY N/A 10/18/2019    Procedure: COLONOSCOPY2 day golytely;  Surgeon: Adryan Schultz MD;  Location: [facility] ENDO;  Service: General;  Laterality: N/A;    EPIDURAL STEROID INJECTION      ESOPHAGOGASTRODUODENOSCOPY N/A 10/18/2019    Procedure: EGD (ESOPHAGOGASTRODUODENOSCOPY);  Surgeon: Adryan Schultz MD;  Location: [facility] ENDO;  Service: General;  Laterality: N/A;    TRANSFORAMINAL EPIDURAL INJECTION OF STEROID Left 2/20/2019    Procedure: INJECTION, STEROID, EPIDURAL, TRANSFORAMINAL APPROACH [3999];  Surgeon: Ad Patel III, MD;  Location: [facility] OR;  Service: Pain Management;  Laterality: Left;  L5/S1       Review of patient's allergies indicates:  No Known Allergies      Social History     Socioeconomic History    Marital status:    Tobacco Use    Smoking status: Some Days     Current packs/day: 0.25     Average packs/day: 0.3 packs/day for 40.0 years (10.0 ttl pk-yrs)     Types: Cigarettes    Smokeless tobacco: Never    Tobacco comments:     smoke 5 cigs a day   Substance and Sexual Activity    Alcohol use: No     Alcohol/week: 0.0 standard drinks of alcohol    Drug use: No    Sexual activity: Not Currently     Social Determinants of Health     Financial Resource Strain: Medium Risk (2/29/2024)    Overall Financial Resource Strain (CARDIA)     Difficulty of Paying Living Expenses: Somewhat hard   Food Insecurity: No Food Insecurity (2/29/2024)    Hunger Vital Sign     Worried About Running Out of Food in the Last Year: Never true     Ran Out of Food in the Last Year:  Never true   Transportation Needs: No Transportation Needs (2/29/2024)    PRAPARE - Transportation     Lack of Transportation (Medical): No     Lack of Transportation (Non-Medical): No   Physical Activity: Insufficiently Active (2/29/2024)    Exercise Vital Sign     Days of Exercise per Week: 3 days     Minutes of Exercise per Session: 20 min   Stress: Stress Concern Present (2/29/2024)    [employer] - Occupational Stress Questionnaire     Feeling of Stress : To some extent   Housing Stability: Low Risk  (2/29/2024)    Housing Stability Vital Sign     Unable to Pay for Housing in the Last Year: No     Number of Places Lived in the Last Year: 1     Unstable Housing in the Last Year: No       Family History   Problem Relation Name Age of Onset    Dementia Mother      Breast cancer Mother         ROS negative except listed above    PHYSICAL EXAMINATION:  Blood pressure (!) 151/79, pulse 84, weight 84.4 kg (186 lb 1.1 oz), SpO2 99%.  Constitutional - No acute distress  HEENT - Grossly normal  Neck - supple.   Cardiovascular - JVP normal  Respiratory - Clear  Musculoskeletal - Peripheral edema no  Dermatologic/Skin - Skin warm and dry.  No rashes.    Neurologic - No acute neurological deficit  Psychiatric - AAOx3    Assessment and Plan  1. 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    Urine Protein Creatinine Ratios:    Prot/Creat Ratio, Urine   Date Value Ref Range Status   03/25/2024 1.98 (H) 0.00 - 0.20 Final   03/05/2024 0.54 (H) 0.00 - 0.20 Final   03/15/2022 1.63 (H) 0.00 - 0.20 Final         Acid-Base:   Lab Results   Component Value Date     05/09/2024     05/09/2024    K 3.9 05/09/2024    K 3.9 05/09/2024    CO2 20 (L) 05/09/2024    CO2 20 (L) 05/09/2024     -Counseled to avoid NSAIDs  -Counseled to drink 50-55 oz a day  -Counseled for sugar control as hyperglycemia can cause RAJINDER due to volume depletion  -Will refer her for renal biopsy (suspected MGRS)  -U/C and Cefdinir 300 mg  for a week for suspected UTI (complained urinary frequency and many bacteria in the urine analysis)    2. HTN: BP goal 130/80 mmHg  -Counseled for low salt diet  -Continue metoprolol 100 mg, losartan 100 mg daily  -Continue to hold HCTZ  -Cousneled her to check BP at home, and will consider adding CCB if needed    3. Bone mineral condition:   Lab Results   Component Value Date    CALCIUM 9.2 05/09/2024    CALCIUM 9.2 05/09/2024    PHOS 4.2 03/25/2024    PHOS 4.3 03/05/2024    PHOS 4.3 03/05/2024     Vit D, 25-Hydroxy   Date Value Ref Range Status   03/25/2024 20 (L) 30 - 96 ng/mL Final     Comment:     Vitamin D deficiency.........<10 ng/mL                              Vitamin D insufficiency......10-29 ng/mL       Vitamin D sufficiency........> or equal to 30 ng/mL  Vitamin D toxicity............>100 ng/mL        Will continue to monitor    4. Anemia:   Lab Results   Component Value Date    HGB 7.9 (L) 05/09/2024    FERRITIN 624 (H) 03/25/2024    IRON 50 03/25/2024    TRANSFERRIN 170 (L) 03/25/2024    TIBC 252 03/25/2024    FESATURATED 20 03/25/2024   Continue ferrous 324 mg daily   Asked her to follow up with Hematology for her MGUS. Would hold off EPO until we figure out whether there is a hematological malignancy/conditions.    5. Type 2 DM:  Last HbA1C   Lab Results   Component Value Date    HGBA1C 7.1 (H) 11/15/2021     Counseled the patient regarding the importance of sugar control to slow CKD progression     6. Lipid management:   Lab Results   Component Value Date    LDLCALC 90.2 03/15/2022   Continue statin    Cold intolerance, will check TSH.    ESRD planing when eGFR < 15   Will consider txp referral when eGFR < 20    Follow up in 1 month after renal biopsy

## 2024-05-13 ENCOUNTER — OFFICE VISIT (OUTPATIENT)
Dept: NEPHROLOGY | Facility: CLINIC | Age: 69
End: 2024-05-13
Payer: MEDICARE

## 2024-05-13 VITALS
DIASTOLIC BLOOD PRESSURE: 79 MMHG | OXYGEN SATURATION: 99 % | SYSTOLIC BLOOD PRESSURE: 151 MMHG | WEIGHT: 186.06 LBS | HEART RATE: 84 BPM | BODY MASS INDEX: 34.03 KG/M2

## 2024-05-13 DIAGNOSIS — N18.4 STAGE 4 CHRONIC KIDNEY DISEASE: ICD-10-CM

## 2024-05-13 DIAGNOSIS — R68.89 COLD INTOLERANCE: ICD-10-CM

## 2024-05-13 DIAGNOSIS — N18.4 ANEMIA IN STAGE 4 CHRONIC KIDNEY DISEASE: ICD-10-CM

## 2024-05-13 DIAGNOSIS — N17.9 ACUTE KIDNEY INJURY: Primary | ICD-10-CM

## 2024-05-13 DIAGNOSIS — R35.0 FREQUENCY OF URINATION: ICD-10-CM

## 2024-05-13 DIAGNOSIS — N18.32 TYPE 2 DIABETES MELLITUS WITH STAGE 3B CHRONIC KIDNEY DISEASE, UNSPECIFIED WHETHER LONG TERM INSULIN USE: ICD-10-CM

## 2024-05-13 DIAGNOSIS — D63.1 ANEMIA IN STAGE 4 CHRONIC KIDNEY DISEASE: ICD-10-CM

## 2024-05-13 DIAGNOSIS — E11.22 TYPE 2 DIABETES MELLITUS WITH STAGE 3B CHRONIC KIDNEY DISEASE, UNSPECIFIED WHETHER LONG TERM INSULIN USE: ICD-10-CM

## 2024-05-13 DIAGNOSIS — R80.9 PROTEINURIA, UNSPECIFIED TYPE: ICD-10-CM

## 2024-05-13 PROCEDURE — 3066F NEPHROPATHY DOC TX: CPT | Mod: CPTII,S$GLB,, | Performed by: INTERNAL MEDICINE

## 2024-05-13 PROCEDURE — 3078F DIAST BP <80 MM HG: CPT | Mod: CPTII,S$GLB,, | Performed by: INTERNAL MEDICINE

## 2024-05-13 PROCEDURE — 3288F FALL RISK ASSESSMENT DOCD: CPT | Mod: CPTII,S$GLB,, | Performed by: INTERNAL MEDICINE

## 2024-05-13 PROCEDURE — 1159F MED LIST DOCD IN RCRD: CPT | Mod: CPTII,S$GLB,, | Performed by: INTERNAL MEDICINE

## 2024-05-13 PROCEDURE — 4010F ACE/ARB THERAPY RXD/TAKEN: CPT | Mod: CPTII,S$GLB,, | Performed by: INTERNAL MEDICINE

## 2024-05-13 PROCEDURE — 3008F BODY MASS INDEX DOCD: CPT | Mod: CPTII,S$GLB,, | Performed by: INTERNAL MEDICINE

## 2024-05-13 PROCEDURE — 3077F SYST BP >= 140 MM HG: CPT | Mod: CPTII,S$GLB,, | Performed by: INTERNAL MEDICINE

## 2024-05-13 PROCEDURE — 3062F POS MACROALBUMINURIA REV: CPT | Mod: CPTII,S$GLB,, | Performed by: INTERNAL MEDICINE

## 2024-05-13 PROCEDURE — 99999 PR PBB SHADOW E&M-EST. PATIENT-LVL III: CPT | Mod: PBBFAC,,, | Performed by: INTERNAL MEDICINE

## 2024-05-13 PROCEDURE — 1126F AMNT PAIN NOTED NONE PRSNT: CPT | Mod: CPTII,S$GLB,, | Performed by: INTERNAL MEDICINE

## 2024-05-13 PROCEDURE — 1101F PT FALLS ASSESS-DOCD LE1/YR: CPT | Mod: CPTII,S$GLB,, | Performed by: INTERNAL MEDICINE

## 2024-05-13 PROCEDURE — 99214 OFFICE O/P EST MOD 30 MIN: CPT | Mod: S$GLB,,, | Performed by: INTERNAL MEDICINE

## 2024-05-13 RX ORDER — CEFDINIR 300 MG/1
300 CAPSULE ORAL DAILY
Qty: 7 CAPSULE | Refills: 0 | Status: SHIPPED | OUTPATIENT
Start: 2024-05-13 | End: 2024-05-20

## 2024-05-15 ENCOUNTER — TELEPHONE (OUTPATIENT)
Dept: NEPHROLOGY | Facility: CLINIC | Age: 69
End: 2024-05-15
Payer: MEDICARE

## 2024-05-27 ENCOUNTER — PATIENT MESSAGE (OUTPATIENT)
Dept: NEPHROLOGY | Facility: CLINIC | Age: 69
End: 2024-05-27
Payer: MEDICARE

## 2024-05-27 ENCOUNTER — TELEPHONE (OUTPATIENT)
Dept: NEPHROLOGY | Facility: CLINIC | Age: 69
End: 2024-05-27
Payer: MEDICARE

## 2024-06-07 DIAGNOSIS — I10 HYPERTENSION, UNSPECIFIED TYPE: Primary | ICD-10-CM

## 2024-06-07 RX ORDER — AMLODIPINE BESYLATE 5 MG/1
5 TABLET ORAL DAILY
Qty: 90 TABLET | Refills: 3 | Status: SHIPPED | OUTPATIENT
Start: 2024-06-07 | End: 2025-06-07

## 2024-06-07 NOTE — PROGRESS NOTES
BP around 140s-160s/80s, will add norvasc 5 mg daily. Instruct the daughter to keep me posted regarding her BP and if there is any side effect. Hopefully, the biopsy can be scheduled in a week or two after the BP stabilized.

## 2024-06-18 ENCOUNTER — TELEPHONE (OUTPATIENT)
Dept: RHEUMATOLOGY | Facility: CLINIC | Age: 69
End: 2024-06-18
Payer: MEDICARE

## 2024-06-18 NOTE — TELEPHONE ENCOUNTER
----- Message from Ariana Barker sent at 6/18/2024  3:23 PM CDT -----  Contact: [phone]  PATIENTCALL     Pt daughter is calling to speak with someone in provider office regarding she needs to know if her mother needs to do labs before her appt. She is asking for a return call please call.

## 2024-06-19 DIAGNOSIS — N18.4 ANEMIA IN STAGE 4 CHRONIC KIDNEY DISEASE: Primary | ICD-10-CM

## 2024-06-19 DIAGNOSIS — D63.1 ANEMIA IN STAGE 4 CHRONIC KIDNEY DISEASE: Primary | ICD-10-CM

## 2024-06-20 DIAGNOSIS — N39.0 COMPLICATED URINARY TRACT INFECTION: Primary | ICD-10-CM

## 2024-06-20 RX ORDER — CEFDINIR 300 MG/1
300 CAPSULE ORAL DAILY
Qty: 14 CAPSULE | Refills: 0 | Status: SHIPPED | OUTPATIENT
Start: 2024-06-20 | End: 2024-07-04

## 2024-06-20 NOTE — PROGRESS NOTES
HPI  This 69 y.o. y/o female with PMH of HTN, HLD, type 2 DM, CVA, CKD, MGUS (monoclonal gammopathy of unknown significance) came in for CKD.    Renal history  Creatinine   Date Value Ref Range Status   06/20/2024 1.9 (H) 0.5 - 1.4 mg/dL Final   05/09/2024 2.1 (H) 0.5 - 1.4 mg/dL Final   05/09/2024 2.1 (H) 0.5 - 1.4 mg/dL Final   03/05/2024 2.04 (H) 0.50 - 1.40 mg/dL Final   03/05/2024 2.04 (H) 0.50 - 1.40 mg/dL Final   07/20/2022 1.3 0.5 - 1.4 mg/dL Final   06/04/2022 1.24 0.50 - 1.40 mg/dL Final   05/11/2022 1.30 (H) 0.57 - 1.25 mg/dL Final   05/11/2022 1.35 (H) 0.57 - 1.25 mg/dL Final   05/10/2022 1.38 (H) 0.57 - 1.25 mg/dL Final   03/15/2022 1.17 0.50 - 1.40 mg/dL Final   11/15/2021 1.37 0.50 - 1.40 mg/dL Final   06/04/2021 1.17 0.50 - 1.20 mg/dL Final   02/23/2021 1.20 0.50 - 1.20 mg/dL Final   03/04/2016 1.0 0.5 - 1.4 mg/dL Final   Current Cr 1.9-2.1, recurrent UTI noted on 6/20, repeat Cefdinir 14 days and refer to Urology  Cr 2.44 2/29/2024 per the PCP office  Cr 1.1-1.4 4583-3350  Prot/Creat Ratio, Urine   Date Value Ref Range Status   06/20/2024 1.56 (H) 0.00 - 0.20 Final   03/25/2024 1.98 (H) 0.00 - 0.20 Final   UA showed protein > 3+ since 2021 03/2024 High IgG is still noted 1763, K/L ratio 4.3, UACR 1.5, UPCR 1.98 (ratio 75%)  SPEP, DIXIE, ds DNA, C3, C4, hep B/C neg   03/2024 RP US showed medical renal disease  Has been drinking 60 oz of fluid a day (30 oz of cup x2), constantly feel thirsty  Not taking NSAIDs    HTN  BP this visit 145/79 mmHg (rush to come here)  BP at home 140/80  Current medication: metoprolol 100 mg, losartan 100 mg daily, HCTZ 25 mg  Not compliant with low salt diet    Type 2 DM  Lab Results   Component Value Date    HGBA1C 7.1 (H) 11/15/2021   HbA1c 9.7 2/29/2024  Not on metformin  With insulin therapy    RP US 03/2024  FINDINGS:  The right and left kidneys measure 10 and 10.6 cm respectively.  The right and left resistive indices measure 0.84 and 0.79 which is elevated  consistent with medical renal disease.  There is no hydronephrosis or nephrolithiasis.  The bladder is decompressed and poorly evaluated.     Impression:     Elevated resistive indices consistent medical renal disease.    Past Medical History:   Diagnosis Date    Anticoagulant long-term use     Colitis     Colon polyp     Depression     Diverticulosis of colon     Foot drop     a couple weeks ago    Hemorrhoids     SOB (shortness of breath)        Past Surgical History:   Procedure Laterality Date    ABSCESS DRAINAGE Right     breast    COLONOSCOPY N/A 10/18/2019    Procedure: COLONOSCOPY2 day golytely;  Surgeon: Adryan Schultz MD;  Location: [facility] END[facility];  Service: General;  Laterality: N/A;    EPIDURAL STEROID INJECTION      ESOPHAGOGASTRODUODENOSCOPY N/A 10/18/2019    Procedure: EGD (ESOPHAGOGASTRODUODENOSCOPY);  Surgeon: Adryan Schultz MD;  Location: [facility] ENDO;  Service: General;  Laterality: N/A;    TRANSFORAMINAL EPIDURAL INJECTION OF STEROID Left 2/20/2019    Procedure: INJECTION, STEROID, EPIDURAL, TRANSFORAMINAL APPROACH [3999];  Surgeon: Ad Patel III, MD;  Location: [facility] OR;  Service: Pain Management;  Laterality: Left;  L5/S1       Review of patient's allergies indicates:  No Known Allergies      Social History     Socioeconomic History    Marital status:    Tobacco Use    Smoking status: Some Days     Current packs/day: 0.25     Average packs/day: 0.3 packs/day for 40.0 years (10.0 ttl pk-yrs)     Types: Cigarettes    Smokeless tobacco: Never    Tobacco comments:     smoke 5 cigs a day   Substance and Sexual Activity    Alcohol use: No     Alcohol/week: 0.0 standard drinks of alcohol    Drug use: No    Sexual activity: Not Currently     Social Determinants of Health     Financial Resource Strain: Medium Risk (2/29/2024)    Overall Financial Resource Strain (CARDIA)     Difficulty of Paying Living Expenses: Somewhat hard   Food Insecurity: No Food Insecurity (2/29/2024)    Hunger  Vital Sign     Worried About Running Out of Food in the Last Year: Never true     Ran Out of Food in the Last Year: Never true   Transportation Needs: No Transportation Needs (2/29/2024)    PRAPARE - Transportation     Lack of Transportation (Medical): No     Lack of Transportation (Non-Medical): No   Physical Activity: Insufficiently Active (2/29/2024)    Exercise Vital Sign     Days of Exercise per Week: 3 days     Minutes of Exercise per Session: 20 min   Stress: Stress Concern Present (2/29/2024)    [employer] [employer] - Occupational Stress Questionnaire     Feeling of Stress : To some extent   Housing Stability: Low Risk  (2/29/2024)    Housing Stability Vital Sign     Unable to Pay for Housing in the Last Year: No     Number of Places Lived in the Last Year: 1     Unstable Housing in the Last Year: No       Family History   Problem Relation Name Age of Onset    Dementia Mother      Breast cancer Mother         ROS negative except listed above    PHYSICAL EXAMINATION:  Blood pressure (!) 145/79, pulse 84, weight 84.4 kg (186 lb 1.1 oz), SpO2 98%.  Constitutional - No acute distress  HEENT - Grossly normal  Neck - supple.   Cardiovascular - JVP normal  Respiratory - Clear  Musculoskeletal - Peripheral edema no  Dermatologic/Skin - Skin warm and dry.  No rashes.    Neurologic - No acute neurological deficit  Psychiatric - AAOx3    Assessment and Plan  1. 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 HTN: BP goal 130/80 mmHg  -Counseled for low salt diet  -Continue metoprolol 100 mg, losartan 100 mg daily, HCTZ 25 mg daily    3. Bone mineral condition:   Lab Results   Component Value Date    CALCIUM 8.4 (L) 06/20/2024    CALCIUM 9.2 05/09/2024    CALCIUM 9.2 05/09/2024    PHOS 3.3 06/20/2024    PHOS 4.2 03/25/2024     Vit D, 25-Hydroxy   Date Value Ref Range Status   03/25/2024 20 (L) 30 - 96 ng/mL Final     Comment:     Vitamin D deficiency.........<10 ng/mL                              Vitamin D insufficiency......10-29 ng/mL       Vitamin D sufficiency........> or equal to 30 ng/mL  Vitamin D toxicity............>100 ng/mL        Will continue to monitor    4. Anemia:   Lab Results   Component Value Date    HGB 7.9 (L) 06/20/2024    FERRITIN 1,051 (H) 06/20/2024    IRON 73 06/20/2024    TRANSFERRIN 179 (L) 06/20/2024    TIBC 265 06/20/2024    FESATURATED 28 06/20/2024   Asked her to follow up with Hematology for her MGUS. Would hold off EPO until we figure out whether there is a hematological malignancy/conditions. Sent a message to Dr. Del Rosario regarding the above.    5. Type 2 DM:  Last HbA1C   Lab Results   Component Value Date    HGBA1C 7.1 (H) 11/15/2021     Counseled the patient regarding the importance of sugar control to slow CKD progression     6. Lipid management:   Lab Results   Component Value Date    LDLCALC 90.2 03/15/2022   Continue statin    ESRD planing when eGFR < 15   Will consider txp referral when eGFR < 20    Follow up in 2 months. (Will repeat lab in 3 weeks and refer for renal biopsy)

## 2024-06-24 ENCOUNTER — OFFICE VISIT (OUTPATIENT)
Dept: NEPHROLOGY | Facility: CLINIC | Age: 69
End: 2024-06-24
Payer: MEDICARE

## 2024-06-24 VITALS
SYSTOLIC BLOOD PRESSURE: 145 MMHG | WEIGHT: 186.06 LBS | DIASTOLIC BLOOD PRESSURE: 79 MMHG | OXYGEN SATURATION: 98 % | BODY MASS INDEX: 34.03 KG/M2 | HEART RATE: 84 BPM

## 2024-06-24 DIAGNOSIS — D63.1 ANEMIA IN STAGE 4 CHRONIC KIDNEY DISEASE: Primary | ICD-10-CM

## 2024-06-24 DIAGNOSIS — N18.4 ANEMIA IN STAGE 4 CHRONIC KIDNEY DISEASE: Primary | ICD-10-CM

## 2024-06-24 DIAGNOSIS — N39.0 COMPLICATED URINARY TRACT INFECTION: ICD-10-CM

## 2024-06-24 DIAGNOSIS — N18.4 STAGE 4 CHRONIC KIDNEY DISEASE: ICD-10-CM

## 2024-06-24 PROCEDURE — 3077F SYST BP >= 140 MM HG: CPT | Mod: CPTII,S$GLB,, | Performed by: INTERNAL MEDICINE

## 2024-06-24 PROCEDURE — 1101F PT FALLS ASSESS-DOCD LE1/YR: CPT | Mod: CPTII,S$GLB,, | Performed by: INTERNAL MEDICINE

## 2024-06-24 PROCEDURE — 99214 OFFICE O/P EST MOD 30 MIN: CPT | Mod: S$GLB,,, | Performed by: INTERNAL MEDICINE

## 2024-06-24 PROCEDURE — 3008F BODY MASS INDEX DOCD: CPT | Mod: CPTII,S$GLB,, | Performed by: INTERNAL MEDICINE

## 2024-06-24 PROCEDURE — 3078F DIAST BP <80 MM HG: CPT | Mod: CPTII,S$GLB,, | Performed by: INTERNAL MEDICINE

## 2024-06-24 PROCEDURE — 99999 PR PBB SHADOW E&M-EST. PATIENT-LVL III: CPT | Mod: PBBFAC,,, | Performed by: INTERNAL MEDICINE

## 2024-06-24 PROCEDURE — 3066F NEPHROPATHY DOC TX: CPT | Mod: CPTII,S$GLB,, | Performed by: INTERNAL MEDICINE

## 2024-06-24 PROCEDURE — 3288F FALL RISK ASSESSMENT DOCD: CPT | Mod: CPTII,S$GLB,, | Performed by: INTERNAL MEDICINE

## 2024-06-24 PROCEDURE — 1126F AMNT PAIN NOTED NONE PRSNT: CPT | Mod: CPTII,S$GLB,, | Performed by: INTERNAL MEDICINE

## 2024-06-24 PROCEDURE — 3062F POS MACROALBUMINURIA REV: CPT | Mod: CPTII,S$GLB,, | Performed by: INTERNAL MEDICINE

## 2024-06-24 PROCEDURE — 4010F ACE/ARB THERAPY RXD/TAKEN: CPT | Mod: CPTII,S$GLB,, | Performed by: INTERNAL MEDICINE

## 2024-06-28 ENCOUNTER — TELEPHONE (OUTPATIENT)
Dept: HEMATOLOGY/ONCOLOGY | Facility: CLINIC | Age: 69
End: 2024-06-28
Payer: MEDICARE

## 2024-06-28 DIAGNOSIS — D47.2 MGUS (MONOCLONAL GAMMOPATHY OF UNKNOWN SIGNIFICANCE): Primary | ICD-10-CM

## 2024-06-28 NOTE — TELEPHONE ENCOUNTER
"----- Message from Rowdy Gay sent at 6/28/2024  1:05 PM CDT -----  Scheduling Request      Patient Status: Est    Scheduling Appt: [facility], F/u (due to low blood count - suggested by her Nephrologist, Dr. Gallardo)    Time/Date Preference: Soonest available    Relationship to Patient?: Nisreen Hairston (Daughter)    Contact Preference?:  [phone]    Treating Provider: Carin    Do you feel you need to be seen today? No    Additional Notes:  "Thank you for all that you do for our patients"  "

## 2024-07-01 ENCOUNTER — TELEPHONE (OUTPATIENT)
Dept: HEMATOLOGY/ONCOLOGY | Facility: CLINIC | Age: 69
End: 2024-07-01
Payer: MEDICARE

## 2024-07-01 DIAGNOSIS — D47.2 MGUS (MONOCLONAL GAMMOPATHY OF UNKNOWN SIGNIFICANCE): Primary | ICD-10-CM

## 2024-07-01 DIAGNOSIS — D49.89 PLASMA CELL NEOPLASM: ICD-10-CM

## 2024-07-02 ENCOUNTER — TELEPHONE (OUTPATIENT)
Dept: UROLOGY | Facility: CLINIC | Age: 69
End: 2024-07-02
Payer: MEDICARE

## 2024-07-05 ENCOUNTER — TELEPHONE (OUTPATIENT)
Dept: NEPHROLOGY | Facility: CLINIC | Age: 69
End: 2024-07-05
Payer: MEDICARE

## 2024-07-17 ENCOUNTER — TELEPHONE (OUTPATIENT)
Dept: NEPHROLOGY | Facility: CLINIC | Age: 69
End: 2024-07-17
Payer: MEDICARE

## 2024-07-29 ENCOUNTER — OFFICE VISIT (OUTPATIENT)
Dept: HEMATOLOGY/ONCOLOGY | Facility: CLINIC | Age: 69
End: 2024-07-29
Payer: MEDICARE

## 2024-07-29 ENCOUNTER — TELEPHONE (OUTPATIENT)
Dept: HEMATOLOGY/ONCOLOGY | Facility: CLINIC | Age: 69
End: 2024-07-29

## 2024-07-29 VITALS
WEIGHT: 196.13 LBS | DIASTOLIC BLOOD PRESSURE: 79 MMHG | RESPIRATION RATE: 18 BRPM | HEIGHT: 62 IN | TEMPERATURE: 99 F | HEART RATE: 81 BPM | SYSTOLIC BLOOD PRESSURE: 158 MMHG | BODY MASS INDEX: 36.09 KG/M2 | OXYGEN SATURATION: 93 %

## 2024-07-29 DIAGNOSIS — E11.21 TYPE 2 DIABETES MELLITUS WITH DIABETIC NEPHROPATHY, UNSPECIFIED WHETHER LONG TERM INSULIN USE: ICD-10-CM

## 2024-07-29 DIAGNOSIS — N18.4 CHRONIC KIDNEY DISEASE (CKD), STAGE IV (SEVERE): ICD-10-CM

## 2024-07-29 DIAGNOSIS — D47.2 MGUS (MONOCLONAL GAMMOPATHY OF UNKNOWN SIGNIFICANCE): ICD-10-CM

## 2024-07-29 DIAGNOSIS — E78.49 OTHER HYPERLIPIDEMIA: Primary | ICD-10-CM

## 2024-07-29 DIAGNOSIS — D64.9 CHRONIC ANEMIA: ICD-10-CM

## 2024-07-29 DIAGNOSIS — I10 ESSENTIAL HYPERTENSION: ICD-10-CM

## 2024-07-29 PROCEDURE — 3077F SYST BP >= 140 MM HG: CPT | Mod: CPTII,S$GLB,, | Performed by: INTERNAL MEDICINE

## 2024-07-29 PROCEDURE — 1126F AMNT PAIN NOTED NONE PRSNT: CPT | Mod: CPTII,S$GLB,, | Performed by: INTERNAL MEDICINE

## 2024-07-29 PROCEDURE — 99214 OFFICE O/P EST MOD 30 MIN: CPT | Mod: S$GLB,,, | Performed by: INTERNAL MEDICINE

## 2024-07-29 PROCEDURE — 3062F POS MACROALBUMINURIA REV: CPT | Mod: CPTII,S$GLB,, | Performed by: INTERNAL MEDICINE

## 2024-07-29 PROCEDURE — 4010F ACE/ARB THERAPY RXD/TAKEN: CPT | Mod: CPTII,S$GLB,, | Performed by: INTERNAL MEDICINE

## 2024-07-29 PROCEDURE — 3066F NEPHROPATHY DOC TX: CPT | Mod: CPTII,S$GLB,, | Performed by: INTERNAL MEDICINE

## 2024-07-29 PROCEDURE — 3008F BODY MASS INDEX DOCD: CPT | Mod: CPTII,S$GLB,, | Performed by: INTERNAL MEDICINE

## 2024-07-29 PROCEDURE — 99999 PR PBB SHADOW E&M-EST. PATIENT-LVL IV: CPT | Mod: PBBFAC,,, | Performed by: INTERNAL MEDICINE

## 2024-07-29 PROCEDURE — 1101F PT FALLS ASSESS-DOCD LE1/YR: CPT | Mod: CPTII,S$GLB,, | Performed by: INTERNAL MEDICINE

## 2024-07-29 PROCEDURE — 3078F DIAST BP <80 MM HG: CPT | Mod: CPTII,S$GLB,, | Performed by: INTERNAL MEDICINE

## 2024-07-29 PROCEDURE — 1159F MED LIST DOCD IN RCRD: CPT | Mod: CPTII,S$GLB,, | Performed by: INTERNAL MEDICINE

## 2024-07-29 PROCEDURE — 3288F FALL RISK ASSESSMENT DOCD: CPT | Mod: CPTII,S$GLB,, | Performed by: INTERNAL MEDICINE

## 2024-07-29 NOTE — PROGRESS NOTES
PROGRESS NOTE    Subjective:       Patient ID: Soila Hairston is a 69 y.o. female.  MRN: [id number]  : 1955  i  Chief Complaint: Anemia ,  paraproteinemia, follow up    History of Present Illness:   Soila Hairston is a 69 y.o. female who presents with chronic anemia.  She has a history of right hemiparesis, on anticoagulation and a history of colitis.     Interim history:  was previously evaluated by  for anemia. Work up showed IgG kappa Monoclonal band 0.73 g/dl()  Recent spinal fusion(). Received 2 units of PRBC during procedure.  She noticed cold intolerance at times. She has a stable right sided weakness following her CVA, stable appetite and PS  She denies any fever, chills or unintentional weight loss.              ROS:   Review of Systems   Constitutional:  Negative for fever, malaise/fatigue and weight loss.   Respiratory:  Negative for cough and shortness of breath.    Cardiovascular:  Negative for chest pain and leg swelling.   Gastrointestinal:  Negative for constipation, diarrhea, nausea and vomiting.   Skin:  Negative for rash.   Neurological:  Negative for seizures and weakness.   Psychiatric/Behavioral:  The patient is not nervous/anxious.               Physical Examination:     Vitals:    24 1344   BP: (!) 158/79   Pulse: 81   Resp: 18   Temp: 98.5 °F (36.9 °C)       Physical Exam  HENT:      Head: Normocephalic and atraumatic.   Eyes:      General: No scleral icterus.  Cardiovascular:      Rate and Rhythm: Normal rate.      Heart sounds: No murmur heard.  Pulmonary:      Effort: No respiratory distress.      Breath sounds: No stridor.   Lymphadenopathy:      Cervical: No cervical adenopathy.   Neurological:      General: No focal deficit present.      Mental Status: She is alert.              Laboratory Data:  All pertinent labs have been reviewed.  Peripheral smear:  PATHOLOGIST  INTERPRETATION   RBC- Normocytic anemia without significant morphologic abnormalities.   Rare schistocyte are noted, but no overt features of hemolysis.   WBC- Normal morphology, normal differential. No dysplastic cells or   circulating blasts are present.   PLT- Normal in number and morphology.   Interpreted by Linda Rojas M.D.       8/5/22 skeletal survery    COMPARISON:  None     FINDINGS:  There are surgical changes involving the lumbar spine with spinal rods and pedicle screws extending from L3 the S1.  There is no evidence for hardware failure.  The patient is edentulous.  There are degenerative changes within the cervical, thoracic, and lumbar spine.  There are healed fracture deformities involving the right pubic bones.  There is no evidence for acute fracture or bone destruction.  Atherosclerotic calcification is present within the abdominal aorta as well as within the pelvic and proximal thigh vasculature.  There is a calcification within the pelvis which has been present on prior examinations, possibly a partially calcified uterine fibroid.     Impression:     No evidence for lytic or sclerotic bone lesions in this patient with history of monoclonal gammopathy.     Surgical changes of the lumbar spine.     Spondylosis.     Atherosclerosis.     Probable calcified uterine fibroid, stable when compared to prior examinations dating back to 02/03/2017.       8/16/22 BONE MARROW ASPIRATE, TOUCH PREP, CLOT, AND DECALCIFIED NEEDLE CORE BIOPSY: LEFT POSTEROSUPERIOR ILIAC CREST     -tab KAPPA LIGHT CHAIN RESTRICTED, CYCLIN D1(-) PLASMA CELL NEOPLASM WITH PARTIAL CD56+ CO-EXPRESSION  AND PARTIAL LOSS OF CD19 EXPRESSION (see comments)     -tab Mildly hypercellular marrow (40-50% total cellularity) with predominantly 3-5% involvement by plasma cell neoplasm and mild erythroid hyperplasia     -tab Normal female karyotype (see comments)     -tab Mildly increased stainable histiocytic iron stores (4+ out of 6+)      -tab Negative for amyloid deposition by Congo Red special stain     Iron Stain: Stainable histiocytic iron stores cannot be optimally evaluated due to aspiculate iron-stained aspirate smear. No ring sideroblasts are identified.     Preliminary findings and case sign-out delay due to need to send out for kappa and lambda immunoglobulin light chain immunostains (due to failed kappa and lambda DUSTIN x2 and no detectable plasma cell light chain restriction by flow cytometry) for definitive light chain expression   Corresponding flow cytometry (FLW-) detects 0.1% plasma cells with no definitive light chain restriction (although kappa predominant) and aberrant   partial CD56 co-expression and partial loss of CD19 expression and no clonal B-cells, aberrant T-cell antigen   expression, or increase in blasts.  Flow cytometric immunophenotyping typically underestimates the percentage of plasma cells present.   ABNORMAL PLASMA CELL POPULATION IDENTIFIED (0.1% of total cellularity) Forward scatter: Low to intermediate   Side scatter: Low to intermediate   Positive:  CD38 (bright),  (variable), CD19 (variable), CD56 (neg to dim)   Negative:  CD45, CD20, sIg, cyIg   Chromosomal Analysis, Bone Marrow Aspirate : NORMAL 46,XX[20]. No clonal abnormality was apparent.     8/16/22 PCPD FISH     The result is abnormal and indicates a plasma cell clone with an immunoglobulin heavy chain (IGH) gene rearrangement that did not translocate to one of the common plasma cell   dyscrasia translocation partners (FGFR3, CCND1, MAF or MAFB). In plasma cell dyscrasias, the prognostic significance for this clone is uncertain (Zhao COOPER., et al.,   Samantha Rev Clin Onc 15:409-421, 2018).     8/16/22 24hr UPEP: Albumin is the predominant urine protein representing approximately 79% of total urine protein.   No paraprotein bands identified.   8/16/22 UIFE: A faint IgG kappa specific monoclonal band present.        Component      Latest Ref Rng  7/26/2024   WBC      3.90 - 12.70 K/uL 7.46    RBC      4.00 - 5.40 M/uL 2.73 (L)    Hemoglobin      12.0 - 16.0 g/dL 8.1 (L)    Hematocrit      37.0 - 48.5 % 24.9 (L)    MCV      82 - 98 fL 91    MCH      27.0 - 31.0 pg 29.7    MCHC      32.0 - 36.0 g/dL 32.5    RDW      11.5 - 14.5 % 14.6 (H)    Platelet Count      150 - 450 K/uL 172    MPV      9.2 - 12.9 fL 11.3    Immature Granulocytes      0.0 - 0.5 % 0.5    Gran # (ANC)      1.8 - 7.7 K/uL 4.2    Immature Grans (Abs)      0.00 - 0.04 K/uL 0.04    Lymph #      1.0 - 4.8 K/uL 2.8    Mono #      0.3 - 1.0 K/uL 0.4    Eos #      0.0 - 0.5 K/uL 0.1    Baso #      0.00 - 0.20 K/uL 0.01    nRBC      0 /100 WBC 0    Gran %      38.0 - 73.0 % 56.1    Lymph %      18.0 - 48.0 % 37.1    Mono %      4.0 - 15.0 % 5.2    Eos %      0.0 - 8.0 % 1.5    Basophil %      0.0 - 1.9 % 0.1    Differential Method Automated    Sodium      136 - 145 mmol/L 143    Potassium      3.5 - 5.1 mmol/L 4.1    Chloride      95 - 110 mmol/L 116 (H)    CO2      23 - 29 mmol/L 19 (L)    Glucose      70 - 110 mg/dL 247 (H)    BUN      8 - 23 mg/dL 35 (H)    Creatinine      0.5 - 1.4 mg/dL 2.5 (H)    Calcium      8.7 - 10.5 mg/dL 9.1    PROTEIN TOTAL      6.0 - 8.4 g/dL 7.5    Albumin      3.5 - 5.2 g/dL 3.3 (L)    BILIRUBIN TOTAL      0.1 - 1.0 mg/dL 0.3    ALP      55 - 135 U/L 126    AST      10 - 40 U/L 31    ALT      10 - 44 U/L 26    eGFR      >60 mL/min/1.73 m^2 20.2 !    Anion Gap      8 - 16 mmol/L 8    IgG      650 - 1600 mg/dL 1988 (H)    IgA Level      40 - 350 mg/dL 121    IgM      50 - 300 mg/dL 44 (L)    Kappa Free Light Chains      0.33 - 1.94 mg/dL 23.70 (H)    Lambda Free Light Chains      0.57 - 2.63 mg/dL 3.88 (H)    Kappa/Lambda FLC Ratio      0.26 - 1.65  6.11 (H)         Assessment/Plan:   MGUS (monoclonal gammopathy of unknown significance)  2.Chronic anemia  3. CKD stage 4   4. Type 2 DM on long term insulin with      Plan:    Skeletal survey on 8/3/22 negative for lytic/  sclerotic bone lesions. 24hr UPEP/UIFE shows faint monoclonal IgG kappa paraprotein on 8/16/22. BM biopsy on 8/16/22 demonstrated mildly hypercellular marrow (40-50% total cellularity) with predominantly 3-5% involvement by plasma cell neoplasm and mild erythroid hyperplasia. Calcium normal , last checked 7/26/24. PET CT still pending.     2. Nutritional (Iron, b12, folate) etiology excluded. She has CKD 4, which is possibly contributing. Anemia is normocytic. No dysplastic changes on bone marrow biopsy. Hgb in 2013 was 10.8g/dl. Most recent Hgb  is 8.1 g/dl. LDH, bilirubin, reticulocytes all previously normal.  She might benefit from procrit.     3. Cr has increased between 4812-1286 most recent Cr 2.5mg/dl, eGFR 20 ( 7/26/24). She is following with nephrology. 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 Renal biopsy is needed to exclude MGRS. Nephrology planning to schedule renal biopsy after treating her UTI.         BMT Chart Routing      Follow up with physician 3 months. with  in 3 m [facility]   Follow up with [facility]    Provider visit type    Infusion scheduling note    Injection scheduling note procrit every 2 weeks at [facility] if hemoglobin less than 10   Labs CBC, CMP, ferritin, free light chains, SPEP and iron and TIBC   Scheduling:  Preferred lab:  Lab interval:  cbc every 2 weeks at [facility]. cbc cmp, spep, iron, tibc, ferritin, light chains in 3 months   Imaging    Pharmacy appointment    Other referrals

## 2024-08-05 ENCOUNTER — HOSPITAL ENCOUNTER (OUTPATIENT)
Dept: RADIOLOGY | Facility: HOSPITAL | Age: 69
Discharge: HOME | End: 2024-08-05
Attending: INTERNAL MEDICINE
Payer: MEDICARE

## 2024-08-05 ENCOUNTER — TELEPHONE (OUTPATIENT)
Dept: CARDIOLOGY | Facility: CLINIC | Age: 69
End: 2024-08-05
Payer: MEDICARE

## 2024-08-05 DIAGNOSIS — D47.2 MGUS (MONOCLONAL GAMMOPATHY OF UNKNOWN SIGNIFICANCE): ICD-10-CM

## 2024-08-05 DIAGNOSIS — D49.89 PLASMA CELL NEOPLASM: ICD-10-CM

## 2024-08-05 LAB — POCT GLUCOSE: 65 MG/DL (ref 70–110)

## 2024-08-05 PROCEDURE — A9552 F18 FDG: HCPCS | Performed by: INTERNAL MEDICINE

## 2024-08-05 PROCEDURE — 78815 PET IMAGE W/CT SKULL-THIGH: CPT | Mod: 26,PI,, | Performed by: NUCLEAR MEDICINE

## 2024-08-05 PROCEDURE — 78815 PET IMAGE W/CT SKULL-THIGH: CPT | Mod: TC

## 2024-08-05 RX ORDER — FLUDEOXYGLUCOSE F18 500 MCI/ML
12.87 INJECTION INTRAVENOUS
Status: COMPLETED | OUTPATIENT
Start: 2024-08-05 | End: 2024-08-05

## 2024-08-05 RX ADMIN — FLUDEOXYGLUCOSE F-18 12.87 MILLICURIE: 500 INJECTION INTRAVENOUS at 09:08

## 2024-08-12 ENCOUNTER — TELEPHONE (OUTPATIENT)
Dept: HEMATOLOGY/ONCOLOGY | Facility: CLINIC | Age: 69
End: 2024-08-12
Payer: MEDICARE

## 2024-08-12 ENCOUNTER — PATIENT MESSAGE (OUTPATIENT)
Dept: NEPHROLOGY | Facility: CLINIC | Age: 69
End: 2024-08-12
Payer: MEDICARE

## 2024-08-12 ENCOUNTER — TELEPHONE (OUTPATIENT)
Dept: RHEUMATOLOGY | Facility: CLINIC | Age: 69
End: 2024-08-12
Payer: MEDICARE

## 2024-08-12 PROBLEM — D63.1 ANEMIA IN STAGE 4 CHRONIC KIDNEY DISEASE: Status: ACTIVE | Noted: 2021-12-22

## 2024-08-12 PROBLEM — N18.4 ANEMIA IN STAGE 4 CHRONIC KIDNEY DISEASE: Status: ACTIVE | Noted: 2021-12-22

## 2024-08-12 NOTE — TELEPHONE ENCOUNTER
----- Message from Kaitlin Siddiqui sent at 8/12/2024  1:28 PM CDT -----  Regarding: Patiet advice  Contact: Nisreen  [phone]            Name of Caller: Nisreen sierra's daughter     Contact Preference:  [phone]     Nature of Call: Requesting a call regarding PET scan results

## 2024-08-12 NOTE — TELEPHONE ENCOUNTER
----- Message from Nannette Barillas MD sent at 8/12/2024  1:08 PM CDT -----  Regarding: RE: test results  Contact: [phone]  Could you ask her to contact Dr. Del Rosario (Hematology)? I didn't order PET scan. Thanks.  Romaine  ----- Message -----  From: Debbie Jeffery MA  Sent: 8/12/2024  11:50 AM CDT  To: Nannette Barillas MD  Subject: FW: test results                                   ----- Message -----  From: Hazel Samano  Sent: 8/12/2024   9:46 AM CDT  To: Eran Brunson Staff  Subject: test results                                     Patient's daughter ( Nisrene )  is calling on behalf of her mother because her mother has not gotten a call back as of yet from Dr. Barillas about her results on her PET Scan's done on 08/05 please call daughter Nisreen at [phone]

## 2024-08-13 ENCOUNTER — OFFICE VISIT (OUTPATIENT)
Dept: HEMATOLOGY/ONCOLOGY | Facility: CLINIC | Age: 69
End: 2024-08-13
Payer: MEDICARE

## 2024-08-13 DIAGNOSIS — D25.9 UTERINE LEIOMYOMA, UNSPECIFIED LOCATION: ICD-10-CM

## 2024-08-13 DIAGNOSIS — N18.4 CHRONIC KIDNEY DISEASE (CKD), STAGE IV (SEVERE): ICD-10-CM

## 2024-08-13 DIAGNOSIS — D63.8 ANEMIA OF CHRONIC DISEASE: ICD-10-CM

## 2024-08-13 DIAGNOSIS — D47.2 MGUS (MONOCLONAL GAMMOPATHY OF UNKNOWN SIGNIFICANCE): Primary | ICD-10-CM

## 2024-08-13 NOTE — PROGRESS NOTES
Spoke with the daughter, she has Procrit scheduled on Friday (ordered by Dr. Del Rosario), so [facility].

## 2024-08-13 NOTE — PROGRESS NOTES
Section of Hematology and Stem Cell Transplantation  Follow-Up Note     08/13/2024  Primary Oncologic Diagnosis: MGUS (monoclonal gammopathy of unknown significance) [D47.2]    The patient location is: Home  The chief complaint leading to consultation is: Home    Visit type: audiovisual    Face to Face time with patient: 15 minutes  30 minutes of total time spent on the encounter, which includes face to face time and non-face to face time preparing to see the patient (eg, review of tests), Obtaining and/or reviewing separately obtained history, Documenting clinical information in the electronic or other health record, Independently interpreting results (not separately reported) and communicating results to the patient/family/caregiver, or Care coordination (not separately reported).     Each patient to whom he or she provides medical services by telemedicine is:  (1) informed of the relationship between the physician and patient and the respective role of any other health care provider with respect to management of the patient; and (2) notified that he or she may decline to receive medical services by telemedicine and may withdraw from such care at any time.    History of Present Ilness:   Soila Noguera) is a pleasant 69 y.o.female from [place], [place], here for follow up of her MGUS/Anemia. She has a history of right hemiparesis (stable) secondary to CVA, on anticoagulation and a history of colitis.      Oncologic History:  Previous patient of Dr. Padgett, for anemia workup. Work up showed IgG kappa Monoclonal band 0.73 g/dl (December 2021).  Spinal fusion May 2022.  Bone Met Survey completed 8/5/2022, no lytic/sclerotic bone lesions; spondylosis  BMBx 8/16/2022 with 3-5% involvement by plasma cell neoplasm and mild erythroid hyperplasia  24hr UPEP/UIFE shows faint monoclonal IgG kappa paraprotein on 8/16/22 8/5/2024 PET CT negative for lytic lesions and actively noted disease    Interval History  "8/13/2024:  Patient and her daughter are both present on the video call today. She continues with stable right sided weakness. She does occasionally have urge associated incontinence and has recently completed antibiotics for a UTI. Patient reports she is doing well and denies fever, chills, drenching night sweats, unexplained weight loss, chest pain, shortness of breath, N/V/D.    Past Medical History, Social History, and Past Family History are unchanged since last evaluation except for HPI.    CURRENT MEDICATIONS:   Current Outpatient Medications   Medication Sig    allopurinoL (ZYLOPRIM) 100 MG tablet Take 100 mg by mouth once daily. Take 1 tablet by mouth daily    alprazolam (XANAX) 2 MG Tab Take 1 tablet (2 mg total) by mouth 2 (two) times daily as needed. Take 1/2 tablet by mouth two times daily as needed.    amLODIPine (NORVASC) 5 MG tablet Take 1 tablet (5 mg total) by mouth once daily.    atorvastatin (LIPITOR) 10 MG tablet Take 4 tablets (40 mg total) by mouth once daily.    BD INSULIN PEN NEEDLE UF MINI 31 gauge x 3/16" Ndle     BEVESPI AEROSPHERE 9-4.8 mcg HFAA  (Patient not taking: Reported on 7/29/2024)    clopidogreL (PLAVIX) 75 mg tablet Take 1 tablet (75 mg total) by mouth once daily.    empagliflozin (JARDIANCE) 25 mg tablet Take 25 mg by mouth once daily. Take one tablet every morning    ergocalciferol (ERGOCALCIFEROL) 50,000 unit Cap Take 50,000 Units by mouth every 7 days.    glipiZIDE 5 MG TR24 Take 5 mg by mouth daily with breakfast. Taking one in the morning and one at night    hydroCHLOROthiazide (HYDRODIURIL) 12.5 MG Tab Take 1 tablet (12.5 mg total) by mouth once daily.    LANTUS SOLOSTAR 100 unit/mL (3 mL) InPn pen Inject 40 Units into the skin once daily.    levocetirizine (XYZAL) 5 MG tablet Take 5 mg by mouth every evening.    losartan (COZAAR) 100 MG tablet Take 1 tablet (100 mg total) by mouth once daily.    metoprolol succinate (TOPROL-XL) 100 MG 24 hr tablet Take 1 tablet (100 mg " total) by mouth once daily.    metoprolol tartrate (LOPRESSOR) 50 MG tablet Take 1 tablet (50 mg total) by mouth 2 (two) times daily. (Patient not taking: Reported on 7/29/2024)    mirabegron (MYRBETRIQ) 50 mg Tb24 Take 1 tablet by mouth once daily. (Patient not taking: Reported on 7/29/2024)    oxyCODONE-acetaminophen (PERCOCET)  mg per tablet Take 1 tablet by mouth every 4 (four) hours as needed for Pain.    pantoprazole (PROTONIX) 40 MG tablet TAKE 1 TABLET EVERY DAY    simvastatin (ZOCOR) 40 MG tablet Take 40 mg by mouth every evening. Take one tablet by mouth every at bedtime    TRUETEST TEST STRIPS Strp      No current facility-administered medications for this visit.     ALLERGIES:   Review of patient's allergies indicates:  No Known Allergies    Review of Systems:     Review of Systems   Constitutional:  Negative for chills, diaphoresis, fever and weight loss.   HENT:  Negative for congestion, sinus pain and sore throat.    Eyes:  Negative for blurred vision and pain.   Respiratory:  Negative for shortness of breath and wheezing.    Cardiovascular:  Negative for chest pain and palpitations.   Gastrointestinal:  Negative for blood in stool, diarrhea, nausea and vomiting.   Genitourinary:  Negative for dysuria and hematuria.   Musculoskeletal:  Negative for back pain and falls.   Skin:  Negative for rash.   Neurological:  Positive for speech change (Stable, post-CVA) and weakness (Right-sided weakness post CVA). Negative for dizziness, sensory change and headaches.   Psychiatric/Behavioral:  The patient is not nervous/anxious and does not have insomnia.        Physical Exam:     Vitals and physical exam deferred due to telemedicine visit.    ECOG Performance Status: (foot note - ECOG PS provided by Eastern Cooperative Oncology Group) 2 - Symptomatic, <50% confined to bed    Karnofsky Performance Score:  50%- Requires Considerable Assistance and Frequent Medical Care    Labs:   Lab Results   Component  Value Date    WBC 7.46 2024    HGB 8.1 (L) 2024    HCT 24.9 (L) 2024    MCV 91 2024     2024     Lab Results   Component Value Date     2024    K 4.1 2024     (H) 2024    CO2 19 (L) 2024    BUN 35 (H) 2024    CREATININE 2.5 (H) 2024    ALBUMIN 3.3 (L) 2024    BILITOT 0.3 2024    ALKPHOS 126 2024    AST 31 2024    ALT 26 2024     Imagin2024 PETCT skull to mid-thigh  Impression:  1. No radiotracer avid lesion to suggest active disease.  2. Predominately peripherally calcified mass abutting the anterior bladder dome without radiotracer uptake, present on pelvic radiograph from 2018, stable and was called calcified uterine fibroid but appears outside uterus. Clinical correlation is advised.  This report was flagged in [employer] as abnormal.    Pathology:  None     Assessment and Plan:     Problem List Items Addressed This Visit       MGUS (monoclonal gammopathy of unknown significance) - Primary  Skeletal survey on 8/3/22 negative for lytic/ sclerotic bone lesions. 24hr UPEP/UIFE shows faint monoclonal IgG kappa paraprotein on 22. BM biopsy on 22 demonstrated mildly hypercellular marrow (40-50% total cellularity) with predominantly 3-5% involvement by plasma cell neoplasm and mild erythroid hyperplasia.  Calcium normal , last checked 24.   2024 PET CT negative for lytic lesions and actively noted disease, see above; results reviewed today with patient      Anemia of chronic disease      Chronic normocytic, hypochromic anemia; long-standing, HGB in  was 10.8 g/dL  Excluded B12/Folate, iron deficiency  LDH, bilirubin, reticulocytes all previously normal  Likely secondary to stage IV chronic kidney disease  Most recent Hgb  is 8.1 g/dl  Continue Procrit every 2 weeks for HGB <10 g/dL  Discussed that both Dr. Del Rosario and Dr. Barillas have plans in for MARY. She should follow up with  Dr. Barillas to see which agent is better for the treatment of her CKD anemia.       Chronic kidney disease (CKD), stage IV (severe)  Cr has increased between 9644-4285, most recent Cr 2.5mg/dl, eGFR 20 ( 7/26/24).  Follows with nephrology, Dr. Barillas.  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 Renal biopsy is needed to exclude MGRS.  Continue with renal biopsy at direction of nephrology. Discussed with patient/daughter that this will tell us the cause of her chronic kidney disease.      Calcified Uterine Fibroid  Seen on recent PET imaging, although noted outside of uterus  Recommended patient follow up with GYN, discussed with patient today         BMT Chart Routing      Follow up with physician . As scheduled in November   Follow up with [facility]    Provider visit type    Infusion scheduling note    Injection scheduling note Procrit, as scheduled   Labs   Scheduling:  Preferred lab:  Lab interval:  As scheduled Nov 1 by [facility]   Imaging    Pharmacy appointment    Other referrals                   Orders Placed:           Total time of this visit was 31 minutes, including time spent face to face with patient and/or via video/audio, and also in preparing for today's visit for MDM and documentation. (Medical Decision Making, including consideration of possible diagnoses, management options, complex medical record review, review of diagnostic tests and information, consideration and discussion of significant complications based on comorbidities, and discussion with providers involved with the care of the patient). Greater than 50% was spent face to face with the patient counseling and coordinating care.    Thank you for allowing me to partake in the care of this patient. If there are any questions, please do not hesitate to reach out.    Violet Junior, FNP-C  Hematologic Malignancies, Stem Cell Transplantation, and Cellular Therapy  [facility] and [facility]

## 2024-08-13 NOTE — Clinical Note
Just wanted to make you aware that Dr. Del Rosario entered a plan for Procrit, and I see you also entered a plan for Aranesp. I figured she doesn't need both agents, so I told her to contact you to see which one is preferred and to not do both.

## 2024-10-22 ENCOUNTER — TELEPHONE (OUTPATIENT)
Dept: HEMATOLOGY/ONCOLOGY | Facility: CLINIC | Age: 69
End: 2024-10-22
Payer: MEDICARE

## 2024-10-22 ENCOUNTER — TELEPHONE (OUTPATIENT)
Dept: NEPHROLOGY | Facility: CLINIC | Age: 69
End: 2024-10-22
Payer: MEDICARE

## 2024-10-22 NOTE — TELEPHONE ENCOUNTER
PATIENT INFORMATION    Anticipatory guidance discussed  Bicycle helmets  Importance of regular dental care  Importance of regular exercise    Follow-Up  - Return for your yearly well child visit.    9-10 years old Health and Safety Tips - The following hyperlinks are available to access via [email or web address]    Parent Education from Healthy Parent    Educación para padres sobre niños sanos    Additional Educational Resources:  For additional resources regarding your symptoms, diagnosis, or further health information, please visit the Discover a Healthier You section on [email or web address] or the Online Health Resources section in [email or web address].   Left message regarding appointments as well as the clinic callback number.

## 2024-10-22 NOTE — TELEPHONE ENCOUNTER
----- Message from Cubby sent at 10/22/2024  1:53 PM CDT -----  Type:  Appointment Request     Name of Caller:Pt's daughter  When is the first available appointment?No access  Symptoms:f/u  Would the patient rather a call back or a response via [facility]? Call back  Best Call Back Number:[phone]   Additional Information: Pt is trying to be scheduled for a follow appointment

## 2024-10-22 NOTE — TELEPHONE ENCOUNTER
----- Message from Ashanti sent at 10/22/2024  1:57 PM CDT -----  Contact: [phone]  Patient is requesting an earlier time for the appointment 11/4/2024

## 2024-11-04 ENCOUNTER — TELEPHONE (OUTPATIENT)
Dept: HEMATOLOGY/ONCOLOGY | Facility: CLINIC | Age: 69
End: 2024-11-04
Payer: MEDICARE

## 2024-11-04 NOTE — TELEPHONE ENCOUNTER
----- Message from Hafsa sent at 11/4/2024 11:35 AM CST -----  Type:  Sooner Appointment Request     Name of Caller:bianka, vee  When is the first available appointment?No access  Symptoms:  Would the patient rather a call back or a response via [facility]? call  Best Call Back Number:[phone]  Additional Information: The daughter is calling in regards to the patient to reschedule the appt today 11/4 to another day. The daughter wasn't okay with 12/4 and wanted to reach out to the staff.

## 2024-11-05 ENCOUNTER — TELEPHONE (OUTPATIENT)
Dept: HEMATOLOGY/ONCOLOGY | Facility: CLINIC | Age: 69
End: 2024-11-05
Payer: MEDICARE

## 2024-11-05 NOTE — TELEPHONE ENCOUNTER
----- Message from Kaitlin sent at 11/4/2024  2:50 PM CST -----  Regarding: Appt  Contact: Nisreen [phone]            Current Appt date:     11/04/24     Type of Appt:  Est pt      Physician:    Misael Mcfarlane MD    Reason for rescheduling: Had car trouble would like a date sooner than 12/04/24     Caller:   Nisreen sierra's daughter      Contact Preference: [phone]

## 2025-01-13 ENCOUNTER — TELEPHONE (OUTPATIENT)
Dept: HEMATOLOGY/ONCOLOGY | Facility: CLINIC | Age: 70
End: 2025-01-13
Payer: MEDICARE

## 2025-01-13 DIAGNOSIS — D63.8 ANEMIA OF CHRONIC DISEASE: ICD-10-CM

## 2025-01-13 DIAGNOSIS — D47.2 MGUS (MONOCLONAL GAMMOPATHY OF UNKNOWN SIGNIFICANCE): Primary | ICD-10-CM

## 2025-01-13 NOTE — TELEPHONE ENCOUNTER
----- Message from Lior sent at 1/13/2025 11:24 AM CST -----  Regarding: FW: Scheduling Request  Contact: Nisreen baxter  Good morningJennifer pt daughter is calling to schedule infusion and FU. Requesting a call back, there's no future orders for infusion.    Thanks    Lior  ----- Message -----  From: Milena Easley  Sent: 1/13/2025  11:16 AM CST  To: [facility]  Pool  Subject: Scheduling Request                               Scheduling Request           Appt Type:  EP     Date/Time Preference:any     Treating Provider:Denys     Caller Name:Nisreen baxter     Contact Preference:[phone] or [phone]     Comments/notes:Patients daughter is calling to schedule infusion and FU. Requesting a call back

## 2025-01-13 NOTE — TELEPHONE ENCOUNTER
Spoke to pts daughter who stated  the pt had recent stay at a skilled facility. Pt is still really weak and is supposed to get home health and they should be coming to see them tomorrow. Advised pt we could get HH to draw labs on her tomorrow to see where her counts are.  Daughter wants to get set back up with inj. (needs to be after she reviews labs with tin unless urgent blood or something needed). Daughter is also saying she needs help with transportation to even get to these appts bc they can't move her own their own. Informed pt she is scheduled for a VV with Tin Ortega NP due to ambulation issues and that we would reach out to social work about setting transportation up. Included Brandon Dunbar LCSW, Rosina Mccartney and Tin about the situation. Waiting on response.

## 2025-01-14 ENCOUNTER — DOCUMENTATION ONLY (OUTPATIENT)
Dept: HEMATOLOGY/ONCOLOGY | Facility: CLINIC | Age: 70
End: 2025-01-14
Payer: MEDICARE

## 2025-01-14 DIAGNOSIS — D64.9 ANEMIA, UNSPECIFIED TYPE: ICD-10-CM

## 2025-01-14 DIAGNOSIS — D63.8 ANEMIA OF CHRONIC DISEASE: ICD-10-CM

## 2025-01-14 DIAGNOSIS — D47.2 MGUS (MONOCLONAL GAMMOPATHY OF UNKNOWN SIGNIFICANCE): Primary | ICD-10-CM

## 2025-01-14 DIAGNOSIS — R53.1 WEAKNESS: ICD-10-CM

## 2025-01-14 NOTE — PROGRESS NOTES
Received request to assist with home health and transportation.  Chart notes referral to [facility] home health following discharge from SNF; reached out to [facility], who do not have her on service.  Left voicemail for daughter Nisreen requesting callback.  Will fax  orders for lab draws when agency identified.  Reached out to Jose; spoke to [place] (ref#[id number]) who reports [facility] Sawyer no longer has transportation benefits outside of emergencies.  Will clarify transport needs with daughter to check pricing of private pay for wheelchair van vs. stretcher.  MGUS is recognized by LLS despite being a benign condition; will clarify with cancer services supervisor to see if patient assistance is available.  [facility] transportation derrick is currently exhausted.  1/17 visit OK for virtual visit.  Will follow.

## 2025-01-15 DIAGNOSIS — D64.9 CHRONIC ANEMIA: Primary | ICD-10-CM

## 2025-01-17 ENCOUNTER — PATIENT MESSAGE (OUTPATIENT)
Dept: NEPHROLOGY | Facility: CLINIC | Age: 70
End: 2025-01-17
Payer: MEDICARE

## 2025-01-23 DIAGNOSIS — I10 PRIMARY HYPERTENSION: ICD-10-CM

## 2025-01-24 RX ORDER — LOSARTAN POTASSIUM 100 MG/1
100 TABLET ORAL DAILY
Qty: 90 TABLET | Refills: 3 | Status: SHIPPED | OUTPATIENT
Start: 2025-01-24

## 2025-01-24 RX ORDER — METOPROLOL SUCCINATE 100 MG/1
100 TABLET, EXTENDED RELEASE ORAL DAILY
Qty: 90 TABLET | Refills: 3 | Status: SHIPPED | OUTPATIENT
Start: 2025-01-24

## 2025-01-29 ENCOUNTER — OFFICE VISIT (OUTPATIENT)
Dept: HEMATOLOGY/ONCOLOGY | Facility: CLINIC | Age: 70
End: 2025-01-29
Payer: MEDICARE

## 2025-01-29 ENCOUNTER — TELEPHONE (OUTPATIENT)
Dept: HEMATOLOGY/ONCOLOGY | Facility: CLINIC | Age: 70
End: 2025-01-29
Payer: MEDICARE

## 2025-01-29 ENCOUNTER — TELEPHONE (OUTPATIENT)
Dept: NEPHROLOGY | Facility: CLINIC | Age: 70
End: 2025-01-29
Payer: MEDICARE

## 2025-01-29 DIAGNOSIS — N18.4 CHRONIC KIDNEY DISEASE (CKD), STAGE IV (SEVERE): ICD-10-CM

## 2025-01-29 DIAGNOSIS — D47.2 MGUS (MONOCLONAL GAMMOPATHY OF UNKNOWN SIGNIFICANCE): Primary | ICD-10-CM

## 2025-01-29 DIAGNOSIS — D63.8 ANEMIA OF CHRONIC DISEASE: ICD-10-CM

## 2025-01-29 NOTE — TELEPHONE ENCOUNTER
----- Message from Judy sent at 1/29/2025 11:09 AM CST -----  Regarding: Scheduling Request (ER visit)  Contact: Nisreen  SCHEDULING/REQUEST    Appt Type: Est     Date/Time Preference: As soon as possible     Treating Provider: Eran     Caller Name: Nisreen     Contact Preference:  [phone]      Comments/Notes: Pt has recently been released from the Skilled facility for a broken hip and femur and her PCP has suggested that she be seen by  for her kidney disease. Would need to be seen as soon as possible.

## 2025-01-29 NOTE — PROGRESS NOTES
The patient location is: Home  The chief complaint leading to consultation is: MGUS    Visit type: audiovisual    Face to Face time with patient: 45  75 minutes of total time spent on the encounter, which includes face to face time and non-face to face time preparing to see the patient (eg, review of tests), Obtaining and/or reviewing separately obtained history, Documenting clinical information in the electronic or other health record, Independently interpreting results (not separately reported) and communicating results to the patient/family/caregiver, or Care coordination (not separately reported).         Each patient to whom he or she provides medical services by telemedicine is:  (1) informed of the relationship between the physician and patient and the respective role of any other health care provider with respect to management of the patient; and (2) notified that he or she may decline to receive medical services by telemedicine and may withdraw from such care at any time.    Notes:      Subjective:      Patient ID: Soila Hairston is a 69 y.o. female.    Chief Complaint: MGUS      HPI:    Soila Hairston (Soila) is a pleasant 69 y.o.female from [place], [place], here for follow up of her MGUS/Anemia. She has a history of right hemiparesis (stable) secondary to CVA, on anticoagulation and a history of colitis.      Oncologic History:  Previous patient of Dr. Padgett, for anemia workup. Work up showed IgG kappa Monoclonal band 0.73 g/dl (December 2021).  Spinal fusion May 2022.  Bone Met Survey completed 8/5/2022, no lytic/sclerotic bone lesions; spondylosis  BMBx 8/16/2022 with 3-5% involvement by plasma cell neoplasm and mild erythroid hyperplasia  24hr UPEP/UIFE shows faint monoclonal IgG kappa paraprotein on 8/16/22 8/5/2024 PET CT negative for lytic lesions and actively noted disease     Interval History 8/13/2024:  Patient and her daughter are both present on the video call today. She continues  with stable right sided weakness. She does occasionally have urge associated incontinence and has recently completed antibiotics for a UTI. Patient reports she is doing well and denies fever, chills, drenching night sweats, unexplained weight loss, chest pain, shortness of breath, N/V/D.    Interval History 1/29/25:  Ms. Hairston presents for VV to discuss MGUS lab work. Her daughter is also present on the video call. Since last visit, patient was hospitalized with closed femur fracture due to mechanical fall from 12/2/24 to 12/23/24.While hospitalized, Cr increased from baseline 2.0/2.5 to 4.3. She was discharged to Rehab facility in [place]. While there, Hgb found to be 6.5. She presented to [facility] ED and received 1 unit PRBC. She again presented to [facility] 1/6/25 for Hgb 6.9 requiring and additional unit of PRBC. No reports of overt bleeding at either ED visit.    She has not had Procrit since November due to admission. Has not followed with Nephrology for the same reason. Daughter states she has contacted her Nephrologist and is awaiting an appt.      Gets injections at [facility].  Need bone marrow bx.   Reached out to Dr. Barillas awaiting an appt.        I have reviewed all of the patient's relevant lab work available in the medical record and have utilized this in my evaluation and management recommendations today.      Social History     Socioeconomic History    Marital status:    Tobacco Use    Smoking status: Some Days     Current packs/day: 0.25     Average packs/day: 0.3 packs/day for 40.0 years (10.0 ttl pk-yrs)     Types: Cigarettes    Smokeless tobacco: Never    Tobacco comments:     smoke 5 cigs a day   Substance and Sexual Activity    Alcohol use: No     Alcohol/week: 0.0 standard drinks of alcohol    Drug use: No    Sexual activity: Not Currently     Social Drivers of Health     Financial Resource Strain: Medium Risk (2/29/2024)    Overall Financial Resource Strain (CARDIA)      Difficulty of Paying Living Expenses: Somewhat hard   Food Insecurity: No Food Insecurity (12/4/2024)    Received from [facility]    Hunger Vital Sign     Worried About Running Out of Food in the Last Year: Never true     Ran Out of Food in the Last Year: Never true   Transportation Needs: No Transportation Needs (12/4/2024)    Received from [facility]    PRAPARE - Transportation     Lack of Transportation (Medical): No     Lack of Transportation (Non-Medical): No   Physical Activity: Insufficiently Active (2/29/2024)    Exercise Vital Sign     Days of Exercise per Week: 3 days     Minutes of Exercise per Session: 20 min   Stress: Stress Concern Present (2/29/2024)    [employer] - Occupational Stress Questionnaire     Feeling of Stress : To some extent   Housing Stability: High Risk (12/4/2024)    Received from [facility]    Housing Stability Vital Sign     Unable to Pay for Housing in the Last Year: Yes     Number of Times Moved in the Last Year: 0     Homeless in the Last Year: Yes       Family History   Problem Relation Name Age of Onset    Dementia Mother      Breast cancer Mother         Past Surgical History:   Procedure Laterality Date    ABSCESS DRAINAGE Right     breast    COLONOSCOPY N/A 10/18/2019    Procedure: COLONOSCOPY2 day golytely;  Surgeon: Adryan Schultz MD;  Location: [facility];  Service: General;  Laterality: N/A;    EPIDURAL STEROID INJECTION      ESOPHAGOGASTRODUODENOSCOPY N/A 10/18/2019    Procedure: EGD (ESOPHAGOGASTRODUODENOSCOPY);  Surgeon: Adryan Schultz MD;  Location: [facility];  Service: General;  Laterality: N/A;    TRANSFORAMINAL EPIDURAL INJECTION OF STEROID Left 2/20/2019    Procedure: INJECTION, STEROID, EPIDURAL, TRANSFORAMINAL APPROACH [3999];  Surgeon: Ad Patel III, MD;  Location: [facility] OR;  Service: Pain Management;  Laterality: Left;  L5/S1       Past Medical History:   Diagnosis Date    Anticoagulant long-term use     Colitis   "   Colon polyp     Depression     Diverticulosis of colon     Foot drop     a couple weeks ago    Hemorrhoids     SOB (shortness of breath)        Review of Systems   Constitutional:  Positive for fatigue. Negative for appetite change, chills, diaphoresis, fever and unexpected weight change.   HENT:  Negative for mouth sores and nosebleeds.    Eyes:  Negative for visual disturbance.   Respiratory:  Negative for cough and shortness of breath.    Cardiovascular:  Negative for chest pain and leg swelling.   Gastrointestinal:  Negative for abdominal pain, anal bleeding, blood in stool and diarrhea.   Genitourinary:  Positive for frequency. Negative for hematuria.   Musculoskeletal:  Negative for back pain.   Skin:  Negative for rash.   Neurological:  Negative for dizziness, light-headedness, numbness and headaches.   Hematological:  Negative for adenopathy. Does not bruise/bleed easily.   Psychiatric/Behavioral:  The patient is not nervous/anxious.           Medication List with Changes/Refills   Current Medications    ALLOPURINOL (ZYLOPRIM) 100 MG TABLET    Take 100 mg by mouth once daily. Take 1 tablet by mouth daily    ALPRAZOLAM (XANAX) 2 MG TAB    Take 1 tablet (2 mg total) by mouth 2 (two) times daily as needed. Take 1/2 tablet by mouth two times daily as needed.    AMLODIPINE (NORVASC) 5 MG TABLET    Take 1 tablet (5 mg total) by mouth once daily.    ATORVASTATIN (LIPITOR) 10 MG TABLET    Take 4 tablets (40 mg total) by mouth once daily.    BD INSULIN PEN NEEDLE UF MINI 31 GAUGE X 3/16" NDLE        BEVESPI AEROSPHERE 9-4.8 MCG HFAA        CLOPIDOGREL (PLAVIX) 75 MG TABLET    Take 1 tablet (75 mg total) by mouth once daily.    EMPAGLIFLOZIN (JARDIANCE) 25 MG TABLET    Take 25 mg by mouth once daily. Take one tablet every morning    ERGOCALCIFEROL (ERGOCALCIFEROL) 50,000 UNIT CAP    Take 50,000 Units by mouth every 7 days.    GLIPIZIDE 5 MG TR24    Take 5 mg by mouth daily with breakfast. Taking one in the morning " and one at night    HYDROCHLOROTHIAZIDE (HYDRODIURIL) 12.5 MG TAB    Take 1 tablet (12.5 mg total) by mouth once daily.    LANTUS SOLOSTAR 100 UNIT/ML (3 ML) INPN PEN    Inject 40 Units into the skin once daily.    LEVOCETIRIZINE (XYZAL) 5 MG TABLET    Take 5 mg by mouth every evening.    LOSARTAN (COZAAR) 100 MG TABLET    TAKE 1 TABLET (100 MG TOTAL) BY MOUTH ONCE DAILY.    METOPROLOL SUCCINATE (TOPROL-XL) 100 MG 24 HR TABLET    TAKE 1 TABLET (100 MG TOTAL) BY MOUTH ONCE DAILY.    METOPROLOL TARTRATE (LOPRESSOR) 50 MG TABLET    Take 1 tablet (50 mg total) by mouth 2 (two) times daily.    MIRABEGRON (MYRBETRIQ) 50 MG TB24    Take 1 tablet by mouth once daily.    OXYCODONE-ACETAMINOPHEN (PERCOCET)  MG PER TABLET    Take 1 tablet by mouth every 4 (four) hours as needed for Pain.    PANTOPRAZOLE (PROTONIX) 40 MG TABLET    TAKE 1 TABLET EVERY DAY    SIMVASTATIN (ZOCOR) 40 MG TABLET    Take 40 mg by mouth every evening. Take one tablet by mouth every at bedtime    TRUETEST TEST STRIPS STRP            Objective:   There were no vitals filed for this visit.    Physical Exam  Constitutional:       Appearance: Normal appearance.   HENT:      Right Ear: External ear normal.      Left Ear: External ear normal.      Nose: Nose normal.   Pulmonary:      Effort: Pulmonary effort is normal.   Musculoskeletal:      Cervical back: Normal range of motion.   Neurological:      Mental Status: She is alert and oriented to person, place, and time.   Psychiatric:         Mood and Affect: Mood normal.         Behavior: Behavior normal.         Thought Content: Thought content normal.         Judgment: Judgment normal.         Assessment:     Problem List Items Addressed This Visit          Renal/    Chronic kidney disease (CKD), stage IV (severe)       Oncology    Anemia of chronic disease    MGUS (monoclonal gammopathy of unknown significance) - Primary       Lab Results   Component Value Date    WBC 10.78 01/16/2025    RBC 2.89  (L) 01/16/2025    HGB 8.3 (L) 01/16/2025    HCT 26.4 (L) 01/16/2025    MCV 91 01/16/2025    MCH 28.7 01/16/2025    MCHC 31.4 (L) 01/16/2025    RDW 15.4 (H) 01/16/2025     01/16/2025    MPV 12.7 01/16/2025    GRAN 7.5 01/16/2025    GRAN 69.3 01/16/2025    LYMPH 2.6 01/16/2025    LYMPH 23.9 01/16/2025    MONO 0.6 01/16/2025    MONO 5.1 01/16/2025    EOS 0.2 01/16/2025    BASO 0.03 01/16/2025    EOSINOPHIL 1.4 01/16/2025    BASOPHIL 0.3 01/16/2025      Lab Results   Component Value Date     01/16/2025    K 4.1 01/16/2025     01/16/2025    CO2 22 (L) 01/16/2025    BUN 38 (H) 01/16/2025    CREATININE 3.3 (H) 01/16/2025    CALCIUM 9.4 01/16/2025    ANIONGAP 11 01/16/2025    ESTGFRAFRICA 48.7 (A) 07/20/2022    EGFRNONAA 42.3 (A) 07/20/2022     Lab Results   Component Value Date    ALT 14 01/16/2025    AST 26 01/16/2025    ALKPHOS 109 01/16/2025    BILITOT 0.8 01/16/2025     Lab Results   Component Value Date    IRON 56 01/16/2025    TRANSFERRIN 149 (L) 01/16/2025    TIBC 221 (L) 01/16/2025    FESATURATED 25 01/16/2025    FERRITIN 1,763 (H) 01/16/2025        Plan:   MGUS (monoclonal gammopathy of unknown significance)  SPEP 1/16/25 reveals paraprotein band increased to 1.44 g/dl from 1.36 in August  FLC ratio also increasing from 6.35 to 6.86 since August  Hgb 8.3 likely due to not receiving Procrit injections for 4 months.  Normal Ca+ level of 9.4  Skeletal survey on 8/3/22 negative for lytic/ sclerotic bone lesions.   Due to increase in paraprotien band and FLC ration, feel repeat BM bx is needed    Anemia of chronic disease  Chronic normocytic, hypochromic anemia; long-standing, HGB in 2013 was 10.8 g/dL  Excluded B12/Folate, iron deficiency  Likely secondary to stage IV chronic kidney disease  Most recent Hgb  is 8.3 g/dl  Continue Procrit every 2 weeks for HGB <10 g/dL    Chronic kidney disease (CKD), stage IV (severe)  Cr has increased between 8667-4516, most recent Cr 3.3 mg/dl down from 4.3 during  [place] [id number] 1/16/25.  Follows with nephrology, Dr. Barillas and is awaiting an appointment  Possible MGRS. Renal biopsy is needed to exclude MGRS.  Continue with renal biopsy at direction of nephrology.       BMT Chart Routing      Follow up with physician . Follow-up with MD after BM bx to discuss. Can be VV. Schedule BM bx on a Monday   Follow up with [facility]    Provider visit type Benign hem   Infusion scheduling note   NA   Injection scheduling note Procrit every 2 weeks at [facility] with CBC prior to each injection. Hold for Hgb 10 or higher   Labs CBC   Scheduling:  Preferred lab:  Lab interval:  CBC every other week prior to Procrit injection   Imaging    Pharmacy appointment No pharmacy appointment needed      Other referrals no referral to Oncology Primary Care needed -  no Massage appointment needed  Schedule bone marrow biopsy  no additional referrals needed            Collaborating Provider:  Dr. Cassandra Dan MD    Thank You,  Geovanna Ortega, FNP-C  Benign Hematology

## 2025-01-29 NOTE — TELEPHONE ENCOUNTER
----- Message from Viridiana sent at 1/29/2025 12:57 PM CST -----  Regarding: FW: Consult/Advisory/ appt  Contact: Nisreen    ----- Message -----  From: Maria Del Carmen Mcintosh  Sent: 1/29/2025  12:01 PM CST  To: [facility]  [facility]  Subject: Consult/Advisory/ appt                           Consult/Advisory     Name Of Caller:Nisreen        Contact Preference:[phone] (Mobile), requesting a call back.        Provider : Touary        Nature of call: Pt daughter is calling to see about pt getting rescheduled  for 01/17/2025, also need to know if she need to have more labs. Please advise.

## 2025-02-03 ENCOUNTER — DOCUMENTATION ONLY (OUTPATIENT)
Dept: HEMATOLOGY/ONCOLOGY | Facility: CLINIC | Age: 70
End: 2025-02-03
Payer: MEDICARE

## 2025-02-03 NOTE — PROGRESS NOTES
Followed up on request to assist with transportation.  Reached daughter Nisreen, who confirms need for wheelchair to navigate steps of home; she is unable to assist due to her own medical issues.  They are able to provide transport once down the stairs.  She requested callback while occupied.  On callback, left voicemail with options for private wheelchair van transport through [employer] at a cost of $350 vs. Home health assistance with loadout prior to appointment.  Awaiting callback.  Will follow.

## 2025-02-05 DIAGNOSIS — D47.2 MGUS (MONOCLONAL GAMMOPATHY OF UNKNOWN SIGNIFICANCE): Primary | ICD-10-CM

## 2025-02-05 DIAGNOSIS — D46.4 REFRACTORY ANEMIA, UNSPECIFIED: ICD-10-CM

## 2025-02-08 DIAGNOSIS — N18.4 CHRONIC KIDNEY DISEASE, STAGE 4 (SEVERE): Primary | ICD-10-CM

## 2025-02-08 NOTE — PROGRESS NOTES
No show today. Spoke with the patient and daughter regarding the current level of eGFR. CKD education class ordered. With the renal function being in the low 20% for months and now 15%, renal biopsy will be low yield and higher risk. Agree with BM biopsy. EPO managed per Hematology.

## 2025-02-10 ENCOUNTER — PROCEDURE VISIT (OUTPATIENT)
Dept: HEMATOLOGY/ONCOLOGY | Facility: CLINIC | Age: 70
End: 2025-02-10
Payer: MEDICARE

## 2025-02-10 VITALS
OXYGEN SATURATION: 98 % | TEMPERATURE: 99 F | DIASTOLIC BLOOD PRESSURE: 77 MMHG | SYSTOLIC BLOOD PRESSURE: 159 MMHG | HEART RATE: 98 BPM

## 2025-02-10 DIAGNOSIS — D46.4 REFRACTORY ANEMIA, UNSPECIFIED: ICD-10-CM

## 2025-02-10 DIAGNOSIS — D47.2 MGUS (MONOCLONAL GAMMOPATHY OF UNKNOWN SIGNIFICANCE): ICD-10-CM

## 2025-02-10 DIAGNOSIS — D64.9 CHRONIC ANEMIA: Primary | ICD-10-CM

## 2025-02-10 LAB — REASON FOR REFERRAL (NARRATIVE): NORMAL

## 2025-02-10 PROCEDURE — 88342 IMHCHEM/IMCYTCHM 1ST ANTB: CPT | Performed by: PATHOLOGY

## 2025-02-10 PROCEDURE — 88313 SPECIAL STAINS GROUP 2: CPT | Mod: 59 | Performed by: PATHOLOGY

## 2025-02-10 PROCEDURE — 88185 FLOWCYTOMETRY/TC ADD-ON: CPT | Performed by: PATHOLOGY

## 2025-02-10 PROCEDURE — 85097 BONE MARROW INTERPRETATION: CPT | Mod: ,,, | Performed by: PATHOLOGY

## 2025-02-10 PROCEDURE — 88189 FLOWCYTOMETRY/READ 16 & >: CPT | Mod: ,,, | Performed by: PATHOLOGY

## 2025-02-10 PROCEDURE — 88342 IMHCHEM/IMCYTCHM 1ST ANTB: CPT | Mod: 26,59,, | Performed by: PATHOLOGY

## 2025-02-10 PROCEDURE — 88237 TISSUE CULTURE BONE MARROW: CPT

## 2025-02-10 PROCEDURE — 38222 DX BONE MARROW BX & ASPIR: CPT | Mod: LT,S$GLB,,

## 2025-02-10 PROCEDURE — 88313 SPECIAL STAINS GROUP 2: CPT | Mod: 26,,, | Performed by: PATHOLOGY

## 2025-02-10 PROCEDURE — 88184 FLOWCYTOMETRY/ TC 1 MARKER: CPT | Performed by: PATHOLOGY

## 2025-02-10 PROCEDURE — 88341 IMHCHEM/IMCYTCHM EA ADD ANTB: CPT | Performed by: PATHOLOGY

## 2025-02-10 PROCEDURE — 88311 DECALCIFY TISSUE: CPT | Mod: 26,,, | Performed by: PATHOLOGY

## 2025-02-10 PROCEDURE — 88305 TISSUE EXAM BY PATHOLOGIST: CPT | Mod: 26,,, | Performed by: PATHOLOGY

## 2025-02-10 PROCEDURE — 88341 IMHCHEM/IMCYTCHM EA ADD ANTB: CPT | Mod: 26,,, | Performed by: PATHOLOGY

## 2025-02-10 PROCEDURE — 88311 DECALCIFY TISSUE: CPT | Performed by: PATHOLOGY

## 2025-02-10 PROCEDURE — 88305 TISSUE EXAM BY PATHOLOGIST: CPT | Performed by: PATHOLOGY

## 2025-02-10 RX ORDER — LIDOCAINE HYDROCHLORIDE 20 MG/ML
8 INJECTION, SOLUTION INFILTRATION; PERINEURAL
Status: COMPLETED | OUTPATIENT
Start: 2025-02-10 | End: 2025-02-10

## 2025-02-10 RX ADMIN — LIDOCAINE HYDROCHLORIDE 8 ML: 20 INJECTION, SOLUTION INFILTRATION; PERINEURAL at 01:02

## 2025-02-10 NOTE — PROCEDURES
Bone marrow    Date/Time: 2/10/2025 1:00 PM    Performed by: Geovanna Ortega FNP-C  Authorized by: Geovanna Ortega FNP-C    Aspiration?: Yes   Biopsy?: Yes      PROCEDURE NOTE:  Bone Marrow aspiration and biopsy  Indication: MGUS  Consent: Informed consent was obtained from patient.  Timeout: Done and documented.  Position: Prone  Site: Left iliac crest  Prep: Betadine.  Needle used: 11 gauge Jamshidi needle.  Anesthetic: 2% lidocaine 8 cc.  Biopsy: The biopsy needle was introduced into the marrow cavity and 15 mL of  aspirate was obtained without complications and sent for flow, cytogenetics. Core biopsy obtained without difficulty and sent for routine histologic examination.  Complications: None.  EBL: minimal  Disposition: The patient was discharged home after 20 minutes.

## 2025-02-12 ENCOUNTER — TELEPHONE (OUTPATIENT)
Dept: NEPHROLOGY | Facility: CLINIC | Age: 70
End: 2025-02-12
Payer: MEDICARE

## 2025-02-12 NOTE — PROGRESS NOTES
Subjective:      Patient ID: Soila Hairston is a 69 y.o. female.    Chief Complaint: MGUS    The patient location is: Home  The chief complaint leading to consultation is: Biopsy results    Visit type: audiovisual    Face to Face time with patient: 15  30 minutes of total time spent on the encounter, which includes face to face time and non-face to face time preparing to see the patient (eg, review of tests), Obtaining and/or reviewing separately obtained history, Documenting clinical information in the electronic or other health record, Independently interpreting results (not separately reported) and communicating results to the patient/family/caregiver, or Care coordination (not separately reported).       Each patient to whom he or she provides medical services by telemedicine is:  (1) informed of the relationship between the physician and patient and the respective role of any other health care provider with respect to management of the patient; and (2) notified that he or she may decline to receive medical services by telemedicine and may withdraw from such care at any time.    Notes:      HPI:    Soila Noguera) is a pleasant 69 y.o.female from [place], [place], here for follow up of her MGUS/Anemia. She has a history of right hemiparesis (stable) secondary to CVA, on anticoagulation and a history of colitis.      Oncologic History:  Previous patient of Dr. Padgett, for anemia workup. Work up showed IgG kappa Monoclonal band 0.73 g/dl (December 2021).  Spinal fusion May 2022.  Bone Met Survey completed 8/5/2022, no lytic/sclerotic bone lesions; spondylosis  BMBx 8/16/2022 with 3-5% involvement by plasma cell neoplasm and mild erythroid hyperplasia  24hr UPEP/UIFE shows faint monoclonal IgG kappa paraprotein on 8/16/22 8/5/2024 PET CT negative for lytic lesions and actively noted disease     Interval History 8/13/2024:  Patient and her daughter are both present on the video call today. She  continues with stable right sided weakness. She does occasionally have urge associated incontinence and has recently completed antibiotics for a UTI. Patient reports she is doing well and denies fever, chills, drenching night sweats, unexplained weight loss, chest pain, shortness of breath, N/V/D.     Interval History 1/29/25:  Ms. Hairston presents for VV to discuss MGUS lab work. Her daughter is also present on the video call. Since last visit, patient was hospitalized with closed femur fracture due to mechanical fall from 12/2/24 to 12/23/24.While hospitalized, Cr increased from baseline 2.0/2.5 to 4.3. She was discharged to Rehab facility in [place]. While there, Hgb found to be 6.5. She presented to [facility] ED and received 1 unit PRBC. She again presented to [facility] 1/6/25 for Hgb 6.9 requiring and additional unit of PRBC. No reports of overt bleeding at either ED visit.     She has not had Procrit since November due to admission. Has not followed with Nephrology for the same reason. Daughter states she has contacted her Nephrologist and is awaiting an appt.      Return Visit:  2/17/25  Return VV to discuss bm bx results. BM bx performed due to increasing FLC ratio and increased level of paraprotein. She has resumed Procrit injections. Has not seen Dr Barillas but states Dr. Barillas is recommending HD which the patient does not desire this at this time.       I have reviewed all of the patient's relevant lab work available in the medical record and have utilized this in my evaluation and management recommendations today.      Social History     Socioeconomic History    Marital status:    Tobacco Use    Smoking status: Some Days     Current packs/day: 0.25     Average packs/day: 0.3 packs/day for 40.0 years (10.0 ttl pk-yrs)     Types: Cigarettes    Smokeless tobacco: Never    Tobacco comments:     smoke 5 cigs a day   Substance and Sexual Activity    Alcohol use: No     Alcohol/week: 0.0 standard drinks of alcohol     Drug use: No    Sexual activity: Not Currently     Social Drivers of Health     Financial Resource Strain: Medium Risk (2/29/2024)    Overall Financial Resource Strain (CARDIA)     Difficulty of Paying Living Expenses: Somewhat hard   Food Insecurity: No Food Insecurity (12/4/2024)    Received from [facility]    Hunger Vital Sign     Worried About Running Out of Food in the Last Year: Never true     Ran Out of Food in the Last Year: Never true   Transportation Needs: No Transportation Needs (12/4/2024)    Received from [facility]    PRAPARE - Transportation     Lack of Transportation (Medical): No     Lack of Transportation (Non-Medical): No   Physical Activity: Insufficiently Active (2/29/2024)    Exercise Vital Sign     Days of Exercise per Week: 3 days     Minutes of Exercise per Session: 20 min   Stress: Stress Concern Present (2/29/2024)    Citizen of the [place] of Occupational Health - Occupational Stress Questionnaire     Feeling of Stress : To some extent   Housing Stability: High Risk (12/4/2024)    Received from [facility]    Housing Stability Vital Sign     Unable to Pay for Housing in the Last Year: Yes     Number of Times Moved in the Last Year: 0     Homeless in the Last Year: Yes       Family History   Problem Relation Name Age of Onset    Dementia Mother      Breast cancer Mother         Past Surgical History:   Procedure Laterality Date    ABSCESS DRAINAGE Right     breast    COLONOSCOPY N/A 10/18/2019    Procedure: COLONOSCOPY2 day golindioly;  Surgeon: Adryan Schultz MD;  Location: [facility];  Service: General;  Laterality: N/A;    EPIDURAL STEROID INJECTION      ESOPHAGOGASTRODUODENOSCOPY N/A 10/18/2019    Procedure: EGD (ESOPHAGOGASTRODUODENOSCOPY);  Surgeon: Adryan Schultz MD;  Location: [facility];  Service: General;  Laterality: N/A;    TRANSFORAMINAL EPIDURAL INJECTION OF STEROID Left 2/20/2019    Procedure: INJECTION, STEROID, EPIDURAL, TRANSFORAMINAL APPROACH [1159];  Surgeon: Ad ROBLERO  "Jorge SOFIA MD;  Location: [facility] OR;  Service: Pain Management;  Laterality: Left;  L5/S1       Past Medical History:   Diagnosis Date    Anticoagulant long-term use     Colitis     Colon polyp     Depression     Diverticulosis of colon     Foot drop     a couple weeks ago    Hemorrhoids     SOB (shortness of breath)        Review of Systems   Constitutional:  Negative for appetite change, chills, diaphoresis, fatigue, fever and unexpected weight change.   HENT:  Negative for mouth sores and nosebleeds.    Eyes:  Negative for visual disturbance.   Respiratory:  Negative for cough and shortness of breath.    Cardiovascular:  Negative for chest pain.   Gastrointestinal:  Negative for abdominal pain, anal bleeding, blood in stool and diarrhea.   Endocrine: Positive for cold intolerance.   Genitourinary:  Positive for frequency. Negative for hematuria.   Musculoskeletal:  Positive for back pain.   Skin:  Negative for rash.   Neurological:  Negative for headaches.   Hematological:  Negative for adenopathy.   Psychiatric/Behavioral:  The patient is nervous/anxious.           Medication List with Changes/Refills   Current Medications    ALLOPURINOL (ZYLOPRIM) 100 MG TABLET    Take 100 mg by mouth once daily. Take 1 tablet by mouth daily    ALPRAZOLAM (XANAX) 2 MG TAB    Take 1 tablet (2 mg total) by mouth 2 (two) times daily as needed. Take 1/2 tablet by mouth two times daily as needed.    AMLODIPINE (NORVASC) 5 MG TABLET    Take 1 tablet (5 mg total) by mouth once daily.    ATORVASTATIN (LIPITOR) 10 MG TABLET    Take 4 tablets (40 mg total) by mouth once daily.    BD INSULIN PEN NEEDLE UF MINI 31 GAUGE X 3/16" NDLE        BEVESPI AEROSPHERE 9-4.8 MCG HFAA        CLOPIDOGREL (PLAVIX) 75 MG TABLET    Take 1 tablet (75 mg total) by mouth once daily.    EMPAGLIFLOZIN (JARDIANCE) 25 MG TABLET    Take 25 mg by mouth once daily. Take one tablet every morning    ERGOCALCIFEROL (ERGOCALCIFEROL) 50,000 UNIT CAP    Take 50,000 " Units by mouth every 7 days.    GLIPIZIDE 5 MG TR24    Take 5 mg by mouth daily with breakfast. Taking one in the morning and one at night    HYDROCHLOROTHIAZIDE (HYDRODIURIL) 12.5 MG TAB    Take 1 tablet (12.5 mg total) by mouth once daily.    LANTUS SOLOSTAR 100 UNIT/ML (3 ML) INPN PEN    Inject 40 Units into the skin once daily.    LEVOCETIRIZINE (XYZAL) 5 MG TABLET    Take 5 mg by mouth every evening.    LOSARTAN (COZAAR) 100 MG TABLET    TAKE 1 TABLET (100 MG TOTAL) BY MOUTH ONCE DAILY.    METOPROLOL SUCCINATE (TOPROL-XL) 100 MG 24 HR TABLET    TAKE 1 TABLET (100 MG TOTAL) BY MOUTH ONCE DAILY.    METOPROLOL TARTRATE (LOPRESSOR) 50 MG TABLET    Take 1 tablet (50 mg total) by mouth 2 (two) times daily.    MIRABEGRON (MYRBETRIQ) 50 MG TB24    Take 1 tablet by mouth once daily.    OXYCODONE-ACETAMINOPHEN (PERCOCET)  MG PER TABLET    Take 1 tablet by mouth every 4 (four) hours as needed for Pain.    PANTOPRAZOLE (PROTONIX) 40 MG TABLET    TAKE 1 TABLET EVERY DAY    SIMVASTATIN (ZOCOR) 40 MG TABLET    Take 40 mg by mouth every evening. Take one tablet by mouth every at bedtime    TRUETEST TEST STRIPS STRP            Objective:   There were no vitals filed for this visit.    Physical Exam    Assessment:     Problem List Items Addressed This Visit          Renal/    Chronic kidney disease (CKD), stage IV (severe)    Relevant Orders    SPEP - Protein electrophoresis, serum    IMMUNOGLOBULIN FREE LT CHAINS BLOOD    CMP       Oncology    Anemia of chronic disease    Relevant Orders    CBC w/ DIFF    SPEP - Protein electrophoresis, serum    IMMUNOGLOBULIN FREE LT CHAINS BLOOD    CMP    IRON AND TIBC    FERRITIN    MGUS (monoclonal gammopathy of unknown significance) - Primary    Relevant Orders    CBC w/ DIFF    SPEP - Protein electrophoresis, serum    IMMUNOGLOBULIN FREE LT CHAINS BLOOD    CMP    IRON AND TIBC    FERRITIN       Lab Results   Component Value Date    WBC 10.78 01/16/2025    RBC 2.89 (L) 01/16/2025     HGB 8.3 (L) 01/16/2025    HCT 26.4 (L) 01/16/2025    MCV 91 01/16/2025    MCH 28.7 01/16/2025    MCHC 31.4 (L) 01/16/2025    RDW 15.4 (H) 01/16/2025     01/16/2025    MPV 12.7 01/16/2025    GRAN 7.5 01/16/2025    GRAN 69.3 01/16/2025    LYMPH 2.6 01/16/2025    LYMPH 23.9 01/16/2025    MONO 0.6 01/16/2025    MONO 5.1 01/16/2025    EOS 0.2 01/16/2025    BASO 0.03 01/16/2025    EOSINOPHIL 1.4 01/16/2025    BASOPHIL 0.3 01/16/2025      Lab Results   Component Value Date     01/16/2025    K 4.1 01/16/2025     01/16/2025    CO2 22 (L) 01/16/2025    BUN 38 (H) 01/16/2025    CREATININE 3.3 (H) 01/16/2025    CALCIUM 9.4 01/16/2025    ANIONGAP 11 01/16/2025    ESTGFRAFRICA 48.7 (A) 07/20/2022    EGFRNONAA 42.3 (A) 07/20/2022     Lab Results   Component Value Date    ALT 14 01/16/2025    AST 26 01/16/2025    ALKPHOS 109 01/16/2025    BILITOT 0.8 01/16/2025     Lab Results   Component Value Date    IRON 56 01/16/2025    TRANSFERRIN 149 (L) 01/16/2025    TIBC 221 (L) 01/16/2025    FESATURATED 25 01/16/2025    FERRITIN 1,763 (H) 01/16/2025        Plan:   MGUS (monoclonal gammopathy of unknown significance)    SPEP 1/16/25 reveals paraprotein band increased to 1.44 g/dl from 1.36 in August  FLC ratio also increasing from 6.35 to 6.86 since August.Reviewed bm biopsy results with patient and daughter which revealed:   Normocellular marrow (30-40% total cellularity) with 7-10% involvement by plasma cell neoplasm and trilineage hematopoiesis. Plasma cell involvement at previous biopsy only 3-5 %.    Plasma cell level involvement has not reached 10 %  which would be considered smoldering myeloma.  However due to increase from 3-5% to 7-10% involvement will increase follow-up to every three months    -     CBC w/ DIFF; Future; Expected date: 02/17/2025  -     SPEP - Protein electrophoresis, serum; Future; Expected date: 02/17/2025  -     IMMUNOGLOBULIN FREE LT CHAINS BLOOD; Future; Expected date: 02/17/2025  -      CMP; Future; Expected date: 02/17/2025    Chronic kidney disease (CKD), stage IV (severe)  HD recommended by Dr. Barillas; has not had a follow-up appt yet  Will message Dr. Barillas with biopsy results to determine utility of renal biopsy for MGRS.    -     SPEP - Protein electrophoresis, serum; Future; Expected date: 02/17/2025  -     IMMUNOGLOBULIN FREE LT CHAINS BLOOD; Future; Expected date: 02/17/2025  -     CMP; Future; Expected date: 02/17/2025    Anemia of chronic disease    Chronic normocytic, hypochromic anemia; long-standing, HGB in 2013 was 10.8 g/dL  Excluded B12/Folate, iron deficiency  Likely secondary to stage IV chronic kidney disease  Most recent Hgb  is 8.3 g/dl  Continue Procrit every 2 weeks for HGB <10 g/dL    -     CBC w/ DIFF; Future; Expected date: 02/17/2025  -     SPEP - Protein electrophoresis, serum; Future; Expected date: 02/17/2025  -     IMMUNOGLOBULIN FREE LT CHAINS BLOOD; Future; Expected date: 02/17/2025  -     CMP; Future; Expected date: 02/17/2025  -     IRON AND TIBC; Future; Expected date: 02/17/2025  -     FERRITIN; Future; Expected date: 02/17/2025      BMT Chart Routing      Follow up with physician    Follow up with [facility] 3 months. VV   Provider visit type Benign hem   Infusion scheduling note   NA   Injection scheduling note NA   Labs CBC, ferritin, iron and TIBC, CMP, SPEP and free light chains   Scheduling:  Preferred lab:  Lab interval:  Labs at [facility]   NA   Pharmacy appointment No pharmacy appointment needed      Other referrals no referral to Oncology Primary Care needed -  no Massage appointment needed    No additional referrals needed           Collaborating Provider:  Dr. Cassandra Dan MD    Thank You,  Geovanna Ortega, FNP-C  Benign Hematology

## 2025-02-13 LAB
BODY SITE - BONE MARROW: NORMAL
CLINICAL DIAGNOSIS - BONE MARROW: NORMAL
COMMENT: NORMAL
FINAL PATHOLOGIC DIAGNOSIS: NORMAL
FLOW CYTOMETRY ANTIBODIES ANALYZED - BONE MARROW: NORMAL
FLOW CYTOMETRY COMMENT - BONE MARROW: NORMAL
FLOW CYTOMETRY INTERPRETATION - BONE MARROW: NORMAL
GROSS: NORMAL
Lab: NORMAL
MICROSCOPIC EXAM: NORMAL

## 2025-02-17 ENCOUNTER — PATIENT MESSAGE (OUTPATIENT)
Dept: NEPHROLOGY | Facility: CLINIC | Age: 70
End: 2025-02-17
Payer: MEDICARE

## 2025-02-17 ENCOUNTER — TELEPHONE (OUTPATIENT)
Dept: NEPHROLOGY | Facility: CLINIC | Age: 70
End: 2025-02-17
Payer: MEDICARE

## 2025-02-17 ENCOUNTER — OFFICE VISIT (OUTPATIENT)
Dept: HEMATOLOGY/ONCOLOGY | Facility: CLINIC | Age: 70
End: 2025-02-17
Payer: MEDICARE

## 2025-02-17 DIAGNOSIS — D47.2 MGUS (MONOCLONAL GAMMOPATHY OF UNKNOWN SIGNIFICANCE): ICD-10-CM

## 2025-02-17 DIAGNOSIS — N18.4 CHRONIC KIDNEY DISEASE, STAGE 4 (SEVERE): Primary | ICD-10-CM

## 2025-02-17 DIAGNOSIS — D47.2 MGUS (MONOCLONAL GAMMOPATHY OF UNKNOWN SIGNIFICANCE): Primary | ICD-10-CM

## 2025-02-17 DIAGNOSIS — N18.4 CHRONIC KIDNEY DISEASE (CKD), STAGE IV (SEVERE): ICD-10-CM

## 2025-02-17 DIAGNOSIS — D63.8 ANEMIA OF CHRONIC DISEASE: ICD-10-CM

## 2025-02-17 DIAGNOSIS — E66.01 SEVERE OBESITY (BMI 35.0-39.9) WITH COMORBIDITY: ICD-10-CM

## 2025-02-17 NOTE — TELEPHONE ENCOUNTER
LCSW called pt to schedule T[employer] Choices class. no answer and was not able to leave a vm. Sent pt a portal msg requesting a call back to schedule edication class.

## 2025-03-07 ENCOUNTER — TELEPHONE (OUTPATIENT)
Dept: NEPHROLOGY | Facility: CLINIC | Age: 70
End: 2025-03-07
Payer: MEDICARE

## 2025-03-07 NOTE — TELEPHONE ENCOUNTER
----- Message from Nannette Barillas MD sent at 2/8/2025 11:24 AM CST -----  Regarding: Reschedule  Pepito Lennon,    Could you reschedule her for the next appointment? (No show today). Thanks.    Romaine

## 2025-03-12 DIAGNOSIS — N18.4 ANEMIA IN STAGE 4 CHRONIC KIDNEY DISEASE: Primary | ICD-10-CM

## 2025-03-12 DIAGNOSIS — D63.1 ANEMIA IN STAGE 4 CHRONIC KIDNEY DISEASE: Primary | ICD-10-CM

## 2025-03-12 RX ORDER — PANTOPRAZOLE SODIUM 40 MG/1
40 TABLET, DELAYED RELEASE ORAL
Qty: 90 TABLET | Refills: 3 | Status: SHIPPED | OUTPATIENT
Start: 2025-03-12

## 2025-03-14 ENCOUNTER — PATIENT MESSAGE (OUTPATIENT)
Dept: NEPHROLOGY | Facility: CLINIC | Age: 70
End: 2025-03-14
Payer: MEDICARE

## 2025-03-15 PROBLEM — N18.5 STAGE 5 CHRONIC KIDNEY DISEASE NOT ON CHRONIC DIALYSIS: Status: ACTIVE | Noted: 2024-03-11

## 2025-03-15 PROBLEM — Z72.0 TOBACCO ABUSE: Status: ACTIVE | Noted: 2017-05-30

## 2025-03-15 NOTE — PROGRESS NOTES
The patient location is: [place] ([place], [place])  The chief complaint leading to consultation is:  Coronary artery disease    Visit type: audiovisual    Face to Face time with patient:  21 minutes 15 seconds  Fifty minutes of total time spent on the encounter, which includes face to face time and non-face to face time preparing to see the patient (eg, review of tests), Obtaining and/or reviewing separately obtained history, Documenting clinical information in the electronic or other health record, Independently interpreting results (not separately reported) and communicating results to the patient/family/caregiver, or Care coordination (not separately reported).         Each patient to whom he or she provides medical services by telemedicine is:  (1) informed of the relationship between the physician and patient and the respective role of any other health care provider with respect to management of the patient; and (2) notified that he or she may decline to receive medical services by telemedicine and may withdraw from such care at any time.    Notes:    Chart has been dictated using voice recognition software.  It is not been reviewed carefully for any transcriptional errors due to this technology.   Subjective:   Patient ID:  Soila Hairston is a 69 y.o. female who presents for evaluation of No chief complaint on file.      HPI:  Patient, who was self-referred and previously seen by Dr. Bae, with hypertension, shortness of breath, lower extremity edema, s/p CVA with right hemiparesis, hypertension, hyperlipidemia, CKD 5, type 2 diabetes, tobacco abuse, class 2 obesity, MGUS with anemia, and coronary artery disease.  Her last transthoracic echocardiogram in September-2023 showed normal LV systolic function mild mitral and tricuspid regurgitation and mild pulmonary systolic hypertension with elevated left ventricular filling pressure.    Patient denies any chest discomfort on exertion or at rest. Patient  denies any palpitations, lightheadedness, or syncope.  The patient states she is not having any shortness of breath, orthopnea, PND, or edema in her left leg.  The patient has edema in her right leg due to her hemiparesis.  The patient's daughter states the patient does appear to be short of breath when she walks across the room (using a walker) and that she is asking for her inhaler at those times.      Past Medical History:   Diagnosis Date    Anticoagulant long-term use     Colitis     Colon polyp     Depression     Diverticulosis of colon     Foot drop     a couple weeks ago    Hemorrhoids     SOB (shortness of breath)        Past Surgical History:   Procedure Laterality Date    ABSCESS DRAINAGE Right     breast    COLONOSCOPY N/A 10/18/2019    Procedure: COLONOSCOPY2 day golytely;  Surgeon: Adryan Schultz MD;  Location: [facility];  Service: General;  Laterality: N/A;    EPIDURAL STEROID INJECTION      ESOPHAGOGASTRODUODENOSCOPY N/A 10/18/2019    Procedure: EGD (ESOPHAGOGASTRODUODENOSCOPY);  Surgeon: Adryan Schultz MD;  Location: [facility];  Service: General;  Laterality: N/A;    TRANSFORAMINAL EPIDURAL INJECTION OF STEROID Left 2/20/2019    Procedure: INJECTION, STEROID, EPIDURAL, TRANSFORAMINAL APPROACH [6409];  Surgeon: Ad Patel III, MD;  Location: [facility];  Service: Pain Management;  Laterality: Left;  L5/S1       Social History[1]    Medications Prior to Visit[2]  The patient is also getting Procrit for anemia related to renal failure    Review of patient's allergies indicates:  No Known Allergies    ROS the patient has not had any active or passive bleeding.  There were no additional cerebrovascular symptoms.  The patient has major mobility problems due to hip fracture from the beginning of December.  Objective:   Physical Exam  Virtual visit, Limited to vital signs  Blood pressure (automated cuff left arm sitting) 153/76  Heart rate 77    Lab Results   Component Value Date      01/16/2025    K 4.1 01/16/2025    BUN 38 (H) 01/16/2025    CREATININE 3.3 (H) 01/16/2025    EGFRNORACEVR 14.5 (A) 01/16/2025     (H) 01/16/2025    HGBA1C 8.3 (H) 12/03/2024    HGBA1C 7.1 (H) 11/15/2021    CHOL 169 03/15/2022    TRIG 99 03/15/2022    HDL 59 03/15/2022    LDLCALC 90.2 03/15/2022    HGB 7.3 (L) 03/13/2025    HCT 22.2 (L) 03/13/2025    WBC 10.09 03/13/2025     03/13/2025    INR 1.2 01/03/2025    INR 1.1 06/04/2022     ECG (04-June-2022) showed normal sinus rhythm with normal intervals and axis and was an overall normal electrocardiogram.    Assessment:     1. Coronary artery disease involving native coronary artery of native heart without angina pectoris    2. Severe obesity (BMI 35.0-39.9) with comorbidity    3. Type 2 diabetes mellitus with diabetic nephropathy, unspecified whether long term insulin use    4. Hyperlipidemia, unspecified hyperlipidemia type    5. Essential hypertension    6. History of stroke    7. Right hemiparesis    8. Stage 5 chronic kidney disease not on chronic dialysis    9. Tobacco abuse    10. MGUS (monoclonal gammopathy of unknown significance)    11. Aortic atherosclerosis      The patient appears to be relatively stable and according to the patient she has no symptoms of ischemia, heart failure or significant arrhythmias.  According to the daughter, the patient does have exertional dyspnea just walking across a room.  However, as the patient has been a long-time smoker, it is difficult to differentiate heart failure from lung disease in his patient, especially without seeing her in person.  The patient has significant mobility problems due to a hip fracture 2 months ago.  However, arrangements will be made for her to have follow-up with Dr. Bae in 1-2 months in person for a thorough examination.      Plan:     Diagnoses and all orders for this visit:    Coronary artery disease involving native coronary artery of native heart without angina  pectoris    Severe obesity (BMI 35.0-39.9) with comorbidity    Type 2 diabetes mellitus with diabetic nephropathy, unspecified whether long term insulin use    Hyperlipidemia, unspecified hyperlipidemia type    Essential hypertension    History of stroke    Right hemiparesis    Stage 5 chronic kidney disease not on chronic dialysis    Tobacco abuse    MGUS (monoclonal gammopathy of unknown significance)    Aortic atherosclerosis          Kian Sanchez MD  Consultative Cardiology           [1]   Social History  Tobacco Use    Smoking status: Some Days     Current packs/day: 0.25     Average packs/day: 0.3 packs/day for 40.0 years (10.0 ttl pk-yrs)     Types: Cigarettes    Smokeless tobacco: Never    Tobacco comments:     smoke 5 cigs a day   Substance Use Topics    Alcohol use: No     Alcohol/week: 0.0 standard drinks of alcohol    Drug use: No   [2]   Outpatient Medications Prior to Visit   Medication Sig Dispense Refill    allopurinoL (ZYLOPRIM) 100 MG tablet Take 100 mg by mouth once daily. Take 1 tablet by mouth daily      amLODIPine (NORVASC) 5 MG tablet Take 1 tablet (5 mg total) by mouth once daily. 90 tablet 3    clopidogreL (PLAVIX) 75 mg tablet Take 1 tablet (75 mg total) by mouth once daily. 30 tablet 0    empagliflozin (JARDIANCE) 25 mg tablet Take 25 mg by mouth once daily. Take one tablet every morning      LANTUS SOLOSTAR 100 unit/mL (3 mL) InPn pen Inject 40 Units into the skin once daily. 1 Box 3    levocetirizine (XYZAL) 5 MG tablet Take 5 mg by mouth every evening.      losartan (COZAAR) 100 MG tablet TAKE 1 TABLET (100 MG TOTAL) BY MOUTH ONCE DAILY. 90 tablet 3    metoprolol succinate (TOPROL-XL) 100 MG 24 hr tablet TAKE 1 TABLET (100 MG TOTAL) BY MOUTH ONCE DAILY. 90 tablet 3    pantoprazole (PROTONIX) 40 MG tablet TAKE 1 TABLET EVERY DAY 90 tablet 3    simvastatin (ZOCOR) 40 MG tablet Take 40 mg by mouth every evening. Take one tablet by mouth every at bedtime      alprazolam (XANAX) 2 MG Tab  "Take 1 tablet (2 mg total) by mouth 2 (two) times daily as needed. Take 1/2 tablet by mouth two times daily as needed. 28 tablet 0    atorvastatin (LIPITOR) 10 MG tablet Take 4 tablets (40 mg total) by mouth once daily. 30 tablet 0    BD INSULIN PEN NEEDLE UF MINI 31 gauge x 3/16" Ndle       BEVESPI AEROSPHERE 9-4.8 mcg HFAA  (Patient not taking: Reported on 7/29/2024)      ergocalciferol (ERGOCALCIFEROL) 50,000 unit Cap Take 50,000 Units by mouth every 7 days.      glipiZIDE 5 MG TR24 Take 5 mg by mouth daily with breakfast. Taking one in the morning and one at night (Patient not taking: Reported on 3/17/2025)      hydroCHLOROthiazide (HYDRODIURIL) 12.5 MG Tab Take 1 tablet (12.5 mg total) by mouth once daily. (Patient not taking: Reported on 3/17/2025) 90 tablet 3    metoprolol tartrate (LOPRESSOR) 50 MG tablet Take 1 tablet (50 mg total) by mouth 2 (two) times daily. (Patient not taking: Reported on 7/29/2024) 180 tablet 3    mirabegron (MYRBETRIQ) 50 mg Tb24 Take 1 tablet by mouth once daily. (Patient not taking: Reported on 2/17/2025)      oxyCODONE-acetaminophen (PERCOCET)  mg per tablet Take 1 tablet by mouth every 4 (four) hours as needed for Pain. 18 tablet 0    TRUETEST TEST STRIPS Strp        No facility-administered medications prior to visit.     "

## 2025-03-17 ENCOUNTER — OFFICE VISIT (OUTPATIENT)
Dept: CARDIOLOGY | Facility: CLINIC | Age: 70
End: 2025-03-17
Payer: MEDICARE

## 2025-03-17 DIAGNOSIS — Z72.0 TOBACCO ABUSE: ICD-10-CM

## 2025-03-17 DIAGNOSIS — G81.91 RIGHT HEMIPARESIS: ICD-10-CM

## 2025-03-17 DIAGNOSIS — I70.0 AORTIC ATHEROSCLEROSIS: ICD-10-CM

## 2025-03-17 DIAGNOSIS — I25.10 CORONARY ARTERY DISEASE INVOLVING NATIVE CORONARY ARTERY OF NATIVE HEART WITHOUT ANGINA PECTORIS: Primary | ICD-10-CM

## 2025-03-17 DIAGNOSIS — I10 ESSENTIAL HYPERTENSION: ICD-10-CM

## 2025-03-17 DIAGNOSIS — Z86.73 HISTORY OF STROKE: ICD-10-CM

## 2025-03-17 DIAGNOSIS — D47.2 MGUS (MONOCLONAL GAMMOPATHY OF UNKNOWN SIGNIFICANCE): ICD-10-CM

## 2025-03-17 DIAGNOSIS — E78.5 HYPERLIPIDEMIA, UNSPECIFIED HYPERLIPIDEMIA TYPE: ICD-10-CM

## 2025-03-17 DIAGNOSIS — N18.5 STAGE 5 CHRONIC KIDNEY DISEASE NOT ON CHRONIC DIALYSIS: ICD-10-CM

## 2025-03-17 DIAGNOSIS — E11.21 TYPE 2 DIABETES MELLITUS WITH DIABETIC NEPHROPATHY, UNSPECIFIED WHETHER LONG TERM INSULIN USE: ICD-10-CM

## 2025-03-17 DIAGNOSIS — E66.01 SEVERE OBESITY (BMI 35.0-39.9) WITH COMORBIDITY: ICD-10-CM

## 2025-03-17 NOTE — Clinical Note
Thank you for allowing me to follow-up with Soila Hairston for coronary artery disease. Please see my note for details of this encounter. If you have any questions, please contact me.  Thank you again for allowing to participate in the care of this patient.

## 2025-03-17 NOTE — PATIENT INSTRUCTIONS
Take your blood pressure each morning and evening for 1 week.  Record the blood pressure, pulse, date and time (AM or PM) and then send the the results all together at 1 time to Dr. Sanchez.

## 2025-03-18 ENCOUNTER — PATIENT MESSAGE (OUTPATIENT)
Dept: NEPHROLOGY | Facility: CLINIC | Age: 70
End: 2025-03-18
Payer: MEDICARE

## 2025-03-20 ENCOUNTER — TELEPHONE (OUTPATIENT)
Dept: GASTROENTEROLOGY | Facility: CLINIC | Age: 70
End: 2025-03-20
Payer: MEDICARE

## 2025-03-20 NOTE — TELEPHONE ENCOUNTER
Attempted to return call. LVM and call back number    ----- Message from Veronica sent at 3/19/2025  5:42 PM CDT -----  Type: General Call Back Name of Caller:Pt daughterWould the patient rather a call back or a response via [facility]? CallBest Call Back Number:[phone] Information: rigo stanley called to schedule a appt for her mom to do a Gi bleed check. Please contact pt daughter with further information

## 2025-03-27 ENCOUNTER — PATIENT MESSAGE (OUTPATIENT)
Dept: NEPHROLOGY | Facility: CLINIC | Age: 70
End: 2025-03-27
Payer: MEDICARE

## 2025-03-28 DIAGNOSIS — D47.2 MGUS (MONOCLONAL GAMMOPATHY OF UNKNOWN SIGNIFICANCE): ICD-10-CM

## 2025-03-28 DIAGNOSIS — R80.9 PROTEINURIA, UNSPECIFIED TYPE: Primary | ICD-10-CM

## 2025-03-31 DIAGNOSIS — I25.10 CORONARY ARTERY DISEASE INVOLVING NATIVE CORONARY ARTERY OF NATIVE HEART WITHOUT ANGINA PECTORIS: Primary | ICD-10-CM

## 2025-04-03 ENCOUNTER — PATIENT MESSAGE (OUTPATIENT)
Dept: NEPHROLOGY | Facility: CLINIC | Age: 70
End: 2025-04-03
Payer: MEDICARE

## 2025-04-04 ENCOUNTER — TELEPHONE (OUTPATIENT)
Dept: NEPHROLOGY | Facility: CLINIC | Age: 70
End: 2025-04-04
Payer: MEDICARE

## 2025-04-14 ENCOUNTER — TELEPHONE (OUTPATIENT)
Dept: CARDIOLOGY | Facility: CLINIC | Age: 70
End: 2025-04-14
Payer: MEDICARE

## 2025-04-14 NOTE — TELEPHONE ENCOUNTER
Radha Fernandez Staff  Caller: [phone] (Today, 10:01 AM)  Request Appt    Appt Type:Annual and EKG  Call Back Number:[phone]  Additional Information:

## 2025-04-17 ENCOUNTER — PATIENT MESSAGE (OUTPATIENT)
Dept: CARDIOLOGY | Facility: CLINIC | Age: 70
End: 2025-04-17
Payer: MEDICARE

## 2025-04-21 ENCOUNTER — OFFICE VISIT (OUTPATIENT)
Dept: NEPHROLOGY | Facility: CLINIC | Age: 70
End: 2025-04-21
Payer: MEDICARE

## 2025-04-21 ENCOUNTER — PATIENT MESSAGE (OUTPATIENT)
Facility: CLINIC | Age: 70
End: 2025-04-21
Payer: MEDICARE

## 2025-04-21 ENCOUNTER — PATIENT MESSAGE (OUTPATIENT)
Dept: NEPHROLOGY | Facility: CLINIC | Age: 70
End: 2025-04-21

## 2025-04-21 VITALS
WEIGHT: 196 LBS | DIASTOLIC BLOOD PRESSURE: 71 MMHG | BODY MASS INDEX: 35.85 KG/M2 | SYSTOLIC BLOOD PRESSURE: 152 MMHG | HEART RATE: 71 BPM

## 2025-04-21 DIAGNOSIS — I10 HYPERTENSION, UNSPECIFIED TYPE: ICD-10-CM

## 2025-04-21 DIAGNOSIS — R80.9 PROTEINURIA, UNSPECIFIED TYPE: ICD-10-CM

## 2025-04-21 DIAGNOSIS — N18.4 CKD (CHRONIC KIDNEY DISEASE), STAGE IV: Primary | ICD-10-CM

## 2025-04-21 DIAGNOSIS — D47.2 MGUS (MONOCLONAL GAMMOPATHY OF UNKNOWN SIGNIFICANCE): Primary | ICD-10-CM

## 2025-04-21 PROCEDURE — 99999 PR PBB SHADOW E&M-EST. PATIENT-LVL III: CPT | Mod: PBBFAC,,, | Performed by: INTERNAL MEDICINE

## 2025-04-21 PROCEDURE — 4010F ACE/ARB THERAPY RXD/TAKEN: CPT | Mod: CPTII,S$GLB,, | Performed by: INTERNAL MEDICINE

## 2025-04-21 PROCEDURE — 3008F BODY MASS INDEX DOCD: CPT | Mod: CPTII,S$GLB,, | Performed by: INTERNAL MEDICINE

## 2025-04-21 PROCEDURE — 3077F SYST BP >= 140 MM HG: CPT | Mod: CPTII,S$GLB,, | Performed by: INTERNAL MEDICINE

## 2025-04-21 PROCEDURE — 3078F DIAST BP <80 MM HG: CPT | Mod: CPTII,S$GLB,, | Performed by: INTERNAL MEDICINE

## 2025-04-21 PROCEDURE — 99214 OFFICE O/P EST MOD 30 MIN: CPT | Mod: S$GLB,,, | Performed by: INTERNAL MEDICINE

## 2025-04-21 PROCEDURE — 1125F AMNT PAIN NOTED PAIN PRSNT: CPT | Mod: CPTII,S$GLB,, | Performed by: INTERNAL MEDICINE

## 2025-04-21 PROCEDURE — 1101F PT FALLS ASSESS-DOCD LE1/YR: CPT | Mod: CPTII,S$GLB,, | Performed by: INTERNAL MEDICINE

## 2025-04-21 PROCEDURE — 3066F NEPHROPATHY DOC TX: CPT | Mod: CPTII,S$GLB,, | Performed by: INTERNAL MEDICINE

## 2025-04-21 PROCEDURE — 3288F FALL RISK ASSESSMENT DOCD: CPT | Mod: CPTII,S$GLB,, | Performed by: INTERNAL MEDICINE

## 2025-04-21 PROCEDURE — 1159F MED LIST DOCD IN RCRD: CPT | Mod: CPTII,S$GLB,, | Performed by: INTERNAL MEDICINE

## 2025-04-21 NOTE — PROGRESS NOTES
CHIEF COMPLAINT/HPI: Soila is a 69 y.o. female for evaluation of No chief complaint on file.    HPI:  I have seen Ms. Hairston today of assessment prekidney biopsy. She had a long history of HTN, DM II and Ischemic stroke 10 years ago with Rt. sided hemiparesis. She was diagnosed to have MGUS and during her nephrology follow up was found to have protein/Cr of 1.5. Her BP at home is around 140/150 mmHg systolic. Her clinic BP today was 162/74 mmHg initially and on repeating it was 152/71mmHg. She is taking Plavix tablet once daily.  We will need a better BP control with a target < 140/90mmHg. She is taking Metoprolol 100 mg daily and Amlodipine 5 mg daily. She was on HCTZ 12.5 mg daily which was stopped because of low BP.  I have asked her to resume HCTZ for couple of weeks and send us BP charting three times daily for a week.  She also will need to repeat the urine Prot./Cr. today to determine the level of proteinuria.   The patient is taking Plavix 75 mg daily, and these medications will need to be stopped 7 days before the biopsy and possibly 2 days after the biopsy.   I have explained to the patient and her daughter all the required steps before proceeding with the biopsy. She will discuss all these points with the primary physician on the appointment this week.       Past Medical History:   Diagnosis Date    Anticoagulant long-term use     Colitis     Colon polyp     Depression     Diverticulosis of colon     Foot drop     a couple weeks ago    Hemorrhoids     SOB (shortness of breath)        Soila reports that she has been smoking cigarettes. She has a 10 pack-year smoking history. She has never used smokeless tobacco. She reports that she does not drink alcohol and does not use drugs.    Family History   Problem Relation Name Age of Onset    Dementia Mother      Breast cancer Mother         ROS:    General: No fever, chills, change in appetite or weight loss  Skin: No rashes or pruritus  Head: No headaches or  recent head trauma  Eyes: No changes in vision or eye pain  Nodes: No swollen glands  Pulmonary: No dyspnea, wheezes, cough or sputum production  Cardiovascular: No chest pain, edema, PND, orthopnea or reduced exercise tolerance  Abdomen: No abdominal pain, nausea, vomiting, constipation or diarrhea  Urinary: No flank pain, dysuria or hematuria  Peripheral Vascular: No claudication or cyanosis  Musculoskeletal: No joint stiffness, swelling or back pain  Neurologic: No history of seizures, tremors, forcal weakness or numbness    PE:    Vitals:    04/21/25 1307   BP: (!) 152/71   Pulse: 71   Weight: 88.9 kg (195 lb 15.8 oz)     On wheel chair.   Mild speech slurring.   Stable condition. Fully alert.   Rt. Sided hemiparesis.       Impression and plan:  CKD IV since 2021 with Proteinuria for a kidney biopsy. We will need a better BP control with a target < 140/90 mmHg before we proceed with the biopsy. I have recommended to take 12.5 mg HCTZ and send us BP charting three times daily for a week. Plavix has to be stopped a week before the scheduled date and two days after.  Ischemic stroke with Rt. sided Hemiparesis.     Primary HTN will need better Bp control with a target < 140/90 mmHg.   MGUS.    Hyperlipidemia.   Morbidly obese with BMI 35.8Kg/m2.   Disk disease.     JAX SMITH.Emil. MD. THEODORA. FACP.  , [employer] / [employer] ([place]  Nephrology Consultant. [facility].   [facility]. 5th [place] [address].    email: [email or web address]  Tel: Office: [phone]

## 2025-04-25 PROCEDURE — 82570 ASSAY OF URINE CREATININE: CPT | Performed by: INTERNAL MEDICINE

## 2025-04-30 ENCOUNTER — TELEPHONE (OUTPATIENT)
Dept: CARDIOLOGY | Facility: CLINIC | Age: 70
End: 2025-04-30
Payer: MEDICARE

## 2025-04-30 NOTE — TELEPHONE ENCOUNTER
----- Message from Med Assistant Villalobos sent at 4/30/2025 12:17 PM CDT -----  Type:  Appointment Request  Name of Caller:pt When is the first available appointment?No accessWould the patient rather a call back or a response via [facility]? Call Best Call Back Number: [phone] Additional Information: Pt is looking to get EKG scheduled. Pt states doctor was suppose to do it for her. Pt would like to speak to someone in doctors office.

## 2025-05-07 NOTE — PROGRESS NOTES
The patient location is: [place], [place]  The chief complaint leading to consultation is: CKD    Visit type: audiovisual    Face to Face time with patient: 15 minutes  30 minutes of total time spent on the encounter, which includes face to face time and non-face to face time preparing to see the patient (eg, review of tests), Obtaining and/or reviewing separately obtained history, Documenting clinical information in the electronic or other health record, Independently interpreting results (not separately reported) and communicating results to the patient/family/caregiver, or Care coordination (not separately reported).     Each patient to whom he or she provides medical services by telemedicine is:  (1) informed of the relationship between the physician and patient and the respective role of any other health care provider with respect to management of the patient; and (2) notified that he or she may decline to receive medical services by telemedicine and may withdraw from such care at any time.    Notes:      HPI  This 70 y.o. y/o female with PMH of HTN, HLD, type 2 DM, CVA, CKD, MGUS (monoclonal gammopathy of unknown significance) came in for CKD.    Renal history  Creatinine   Date Value Ref Range Status   01/16/2025 3.3 (H) 0.5 - 1.4 mg/dL Final   12/22/2024 3.20 (H) 0.50 - 1.10 mg/dL Final   12/18/2024 3.36 (H) 0.50 - 1.10 mg/dL Final   12/14/2024 3.78 (H) 0.50 - 1.10 mg/dL Final   12/13/2024 3.68 (H) 0.50 - 1.10 mg/dL Final   12/12/2024 3.81 (H) 0.50 - 1.10 mg/dL Final   12/12/2024 3.75 (H) 0.50 - 1.10 mg/dL Final   12/11/2024 4.07 (H) 0.50 - 1.10 mg/dL Final   12/10/2024 4.13 (H) 0.50 - 1.10 mg/dL Final   12/09/2024 3.99 (H) 0.50 - 1.10 mg/dL Final   12/08/2024 3.66 (H) 0.50 - 1.10 mg/dL Final   09/17/2024 2.2 (H) 0.5 - 1.4 mg/dL Final   07/26/2024 2.5 (H) 0.5 - 1.4 mg/dL Final   06/20/2024 1.9 (H) 0.5 - 1.4 mg/dL Final   05/09/2024 2.1 (H) 0.5 - 1.4 mg/dL Final   05/09/2024 2.1 (H) 0.5 - 1.4 mg/dL Final    03/05/2024 2.04 (H) 0.50 - 1.40 mg/dL Final   03/05/2024 2.04 (H) 0.50 - 1.40 mg/dL Final   07/20/2022 1.3 0.5 - 1.4 mg/dL Final   06/04/2022 1.24 0.50 - 1.40 mg/dL Final   Current Cr 1.9-2.1, recurrent UTI noted on 6/20, repeat Cefdinir 14 days and refer to Urology  Cr 2.44 2/29/2024 per the PCP office  Cr 1.1-1.4 5339-7748  Urine Protein/Creatinine Ratio   Date Value Ref Range Status   04/25/2025 1.87 (H) <=0.20 Final     Prot/Creat Ratio, Urine   Date Value Ref Range Status   06/20/2024 1.56 (H) 0.00 - 0.20 Final   03/25/2024 1.98 (H) 0.00 - 0.20 Final   UA showed protein > 3+ since 2021 03/2024 High IgG is still noted 1763, K/L ratio 4.3, UACR 1.5, UPCR 1.98 (ratio 75%)  SPEP, DIXIE, ds DNA, C3, C4, hep B/C neg   03/2024 RP US showed medical renal disease  Has been drinking 60 oz of fluid a day (30 oz of cup x2), constantly feel thirsty  Not taking NSAIDs    HTN  BP at home 147/77 mmHg just now.   Current medication: metoprolol 100 mg, losartan 100 mg daily, amlodipine 5 mg daily; the HCTZ was added by Dr. Gottlieb but was discontinued by his PCP  Not compliant with low salt diet    Type 2 DM  Lab Results   Component Value Date    HGBA1C 8.3 (H) 12/03/2024   HbA1c 9.7 2/29/2024  Not on metformin  With insulin therapy    RP US 03/2025  FINDINGS:  Right kidney: The right kidney measures 11.0 cm. No cortical thinning. No loss of corticomedullary distinction. Resistive index measures 0.79.  No mass. No renal stone. No hydronephrosis.     Left kidney: The left kidney measures 11.4 cm. No cortical thinning. No loss of corticomedullary distinction. Resistive index measures 0.75.  There is a septated cyst within the upper pole of the left kidney measuring 1.2 x 1.1 x 1.0 cm containing a couple of thin internal septations.  No renal stone. No hydronephrosis.     The bladder is partially distended at the time of scanning and has an unremarkable appearance.     Impression:     1.2 cm complex cyst within the upper pole of  the left kidney containing a couple of thin internal septations.  This is almost certainly benign but was not visualized on the prior examination dated 03/25/2024.  Follow-up renal ultrasound examination in 3-6 months is recommended.       Past Medical History:   Diagnosis Date    Anticoagulant long-term use     Colitis     Colon polyp     Depression     Diverticulosis of colon     Foot drop     a couple weeks ago    Hemorrhoids     SOB (shortness of breath)        Past Surgical History:   Procedure Laterality Date    ABSCESS DRAINAGE Right     breast    COLONOSCOPY N/A 10/18/2019    Procedure: COLONOSCOPY2 day golytely;  Surgeon: Adryan Schultz MD;  Location: [facility];  Service: General;  Laterality: N/A;    EPIDURAL STEROID INJECTION      ESOPHAGOGASTRODUODENOSCOPY N/A 10/18/2019    Procedure: EGD (ESOPHAGOGASTRODUODENOSCOPY);  Surgeon: Adryan Schultz MD;  Location: [facility];  Service: General;  Laterality: N/A;    TRANSFORAMINAL EPIDURAL INJECTION OF STEROID Left 2/20/2019    Procedure: INJECTION, STEROID, EPIDURAL, TRANSFORAMINAL APPROACH [3999];  Surgeon: Ad Patel III, MD;  Location: [facility] OR;  Service: Pain Management;  Laterality: Left;  L5/S1       Review of patient's allergies indicates:  No Known Allergies      Social History     Socioeconomic History    Marital status:    Tobacco Use    Smoking status: Some Days     Current packs/day: 0.25     Average packs/day: 0.3 packs/day for 40.0 years (10.0 ttl pk-yrs)     Types: Cigarettes    Smokeless tobacco: Never    Tobacco comments:     smoke 5 cigs a day   Substance and Sexual Activity    Alcohol use: No     Alcohol/week: 0.0 standard drinks of alcohol    Drug use: No    Sexual activity: Not Currently     Social Drivers of Health     Financial Resource Strain: Low Risk  (3/14/2025)    Overall Financial Resource Strain (CARDIA)     Difficulty of Paying Living Expenses: Not very hard   Food Insecurity: No Food Insecurity (3/14/2025)     Hunger Vital Sign     Worried About Running Out of Food in the Last Year: Never true     Ran Out of Food in the Last Year: Never true   Transportation Needs: Unmet Transportation Needs (3/14/2025)    PRAPARE - Transportation     Lack of Transportation (Medical): Yes     Lack of Transportation (Non-Medical): No   Physical Activity: Inactive (3/14/2025)    Exercise Vital Sign     Days of Exercise per Week: 0 days     Minutes of Exercise per Session: 10 min   Stress: No Stress Concern Present (3/14/2025)    [place] [place] [employer] - Occupational Stress Questionnaire     Feeling of Stress : Only a little   Housing Stability: High Risk (3/14/2025)    Housing Stability Vital Sign     Unable to Pay for Housing in the Last Year: Yes     Number of Times Moved in the Last Year: 0     Homeless in the Last Year: No       Family History   Problem Relation Name Age of Onset    Dementia Mother      Breast cancer Mother         ROS negative except listed above    PHYSICAL EXAMINATION:  There were no vitals taken for this visit.  Constitutional - No acute distress  HEENT - Grossly normal  Neck - supple.   Cardiovascular - JVP normal  Respiratory - Clear  Musculoskeletal - Peripheral edema no  Dermatologic/Skin - Skin warm and dry.  No rashes.    Neurologic - No acute neurological deficit  Psychiatric - AAOx3    Assessment and Plan  1. 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    Urine Protein Creatinine Ratios:    Urine Protein/Creatinine Ratio   Date Value Ref Range Status   04/25/2025 1.87 (H) <=0.20 Final     Prot/Creat Ratio, Urine   Date Value Ref Range Status   06/20/2024 1.56 (H) 0.00 - 0.20 Final   03/25/2024 1.98 (H) 0.00 - 0.20 Final   03/05/2024 0.54 (H) 0.00 - 0.20 Final         Acid-Base:   Lab Results   Component Value Date     01/16/2025    K 4.1 01/16/2025    CO2 22 (L) 01/16/2025     -Counseled to avoid NSAIDs  -Counseled to drink 50-55 oz a day  -Continue moderate meat intake and drink half teaspoon of  baking soda a day  -Message sent to her PCP and Dr. Gottlieb regarding the BP control and plan for biopsy  -Will repeat renal US for complex cyst    2. HTN: BP goal 130/80 mmHg  -Counseled for low salt diet  -Continue metoprolol 100 mg, losartan 100 mg daily  -Will increase amlodipine to 7.5 mg daily temporarily until the biopsy is done and then will go back to 5 mg daily after that    3. Bone mineral condition:   Lab Results   Component Value Date    CALCIUM 9.4 01/16/2025    CALCIUM 8.9 12/22/2024    PHOS 3.3 06/20/2024    PHOS 4.2 03/25/2024     Vit D, 25-Hydroxy   Date Value Ref Range Status   03/25/2024 20 (L) 30 - 96 ng/mL Final     Comment:     Vitamin D deficiency.........<10 ng/mL                              Vitamin D insufficiency......10-29 ng/mL       Vitamin D sufficiency........> or equal to 30 ng/mL  Vitamin D toxicity............>100 ng/mL        Will continue to monitor    4. Anemia:   Lab Results   Component Value Date    HGB 7.3 (L) 04/03/2025    FERRITIN 1,763 (H) 01/16/2025    IRON 56 01/16/2025    TRANSFERRIN 149 (L) 01/16/2025    TIBC 221 (L) 01/16/2025    FESATURATED 25 01/16/2025   Asked her to follow up with Hematology for her MGUS. Would hold off EPO until we figure out whether there is a hematological malignancy/conditions. Sent a message to Dr. Del Rosario regarding the above. Renal biopsy as above.    5. Type 2 DM:  Last HbA1C   Lab Results   Component Value Date    HGBA1C 8.3 (H) 12/03/2024     Counseled the patient regarding the importance of sugar control to slow CKD progression     6. Lipid management:   Lab Results   Component Value Date    LDLCALC 90.2 03/15/2022   Continue statin    ESRD planing when eGFR < 15   Will consider txp referral when eGFR < 20    Follow up in 3 months.

## 2025-05-08 ENCOUNTER — PATIENT MESSAGE (OUTPATIENT)
Dept: NEPHROLOGY | Facility: CLINIC | Age: 70
End: 2025-05-08

## 2025-05-08 ENCOUNTER — TELEPHONE (OUTPATIENT)
Dept: NEPHROLOGY | Facility: CLINIC | Age: 70
End: 2025-05-08

## 2025-05-08 ENCOUNTER — OFFICE VISIT (OUTPATIENT)
Dept: NEPHROLOGY | Facility: CLINIC | Age: 70
End: 2025-05-08
Payer: MEDICARE

## 2025-05-08 DIAGNOSIS — E11.69 HYPERLIPIDEMIA ASSOCIATED WITH TYPE 2 DIABETES MELLITUS: ICD-10-CM

## 2025-05-08 DIAGNOSIS — E78.5 HYPERLIPIDEMIA ASSOCIATED WITH TYPE 2 DIABETES MELLITUS: ICD-10-CM

## 2025-05-08 DIAGNOSIS — E11.22 TYPE 2 DIABETES MELLITUS WITH STAGE 4 CHRONIC KIDNEY DISEASE, UNSPECIFIED WHETHER LONG TERM INSULIN USE: ICD-10-CM

## 2025-05-08 DIAGNOSIS — I10 HYPERTENSION, UNSPECIFIED TYPE: ICD-10-CM

## 2025-05-08 DIAGNOSIS — N18.4 CHRONIC KIDNEY DISEASE, STAGE 4 (SEVERE): Primary | ICD-10-CM

## 2025-05-08 DIAGNOSIS — N18.4 TYPE 2 DIABETES MELLITUS WITH STAGE 4 CHRONIC KIDNEY DISEASE, UNSPECIFIED WHETHER LONG TERM INSULIN USE: ICD-10-CM

## 2025-05-08 RX ORDER — AMLODIPINE BESYLATE 2.5 MG/1
2.5 TABLET ORAL DAILY
Qty: 30 TABLET | Refills: 0 | Status: SHIPPED | OUTPATIENT
Start: 2025-05-08 | End: 2025-06-07

## 2025-05-21 ENCOUNTER — PATIENT MESSAGE (OUTPATIENT)
Dept: NEPHROLOGY | Facility: CLINIC | Age: 70
End: 2025-05-21
Payer: MEDICARE

## 2025-06-04 ENCOUNTER — PATIENT MESSAGE (OUTPATIENT)
Dept: NEPHROLOGY | Facility: CLINIC | Age: 70
End: 2025-06-04
Payer: MEDICARE

## 2025-06-05 ENCOUNTER — TELEPHONE (OUTPATIENT)
Dept: VASCULAR SURGERY | Facility: CLINIC | Age: 70
End: 2025-06-05
Payer: MEDICARE

## 2025-06-06 ENCOUNTER — PATIENT MESSAGE (OUTPATIENT)
Dept: NEPHROLOGY | Facility: CLINIC | Age: 70
End: 2025-06-06
Payer: MEDICARE

## 2025-06-06 DIAGNOSIS — N17.9 AKI (ACUTE KIDNEY INJURY): Primary | ICD-10-CM

## 2025-06-06 DIAGNOSIS — N18.6 ESRD (END STAGE RENAL DISEASE): Primary | ICD-10-CM

## 2025-06-06 DIAGNOSIS — Z01.818 PRE-PROCEDURAL EXAMINATION: ICD-10-CM

## 2025-06-06 RX ORDER — SODIUM CHLORIDE 9 MG/ML
INJECTION, SOLUTION INTRAVENOUS CONTINUOUS
OUTPATIENT
Start: 2025-06-06 | End: 2025-06-06

## 2025-06-09 ENCOUNTER — TELEPHONE (OUTPATIENT)
Dept: NEPHROLOGY | Facility: CLINIC | Age: 70
End: 2025-06-09
Payer: MEDICARE

## 2025-06-09 DIAGNOSIS — R80.9 PROTEINURIA, UNSPECIFIED TYPE: Primary | ICD-10-CM

## 2025-06-09 DIAGNOSIS — N18.4 CHRONIC KIDNEY DISEASE, STAGE 4 (SEVERE): Primary | ICD-10-CM

## 2025-06-09 DIAGNOSIS — R80.9 PROTEINURIA OF UNDIAGNOSED CAUSE: ICD-10-CM

## 2025-06-09 RX ORDER — SODIUM CHLORIDE 9 MG/ML
INJECTION, SOLUTION INTRAVENOUS CONTINUOUS
OUTPATIENT
Start: 2025-06-09 | End: 2025-06-09

## 2025-06-13 ENCOUNTER — TELEPHONE (OUTPATIENT)
Dept: NEPHROLOGY | Facility: CLINIC | Age: 70
End: 2025-06-13
Payer: MEDICARE

## 2025-06-16 ENCOUNTER — HOSPITAL ENCOUNTER (OUTPATIENT)
Facility: HOSPITAL | Age: 70
Discharge: HOME | End: 2025-06-17
Attending: STUDENT IN AN ORGANIZED HEALTH CARE EDUCATION/TRAINING PROGRAM | Admitting: STUDENT IN AN ORGANIZED HEALTH CARE EDUCATION/TRAINING PROGRAM
Payer: MEDICARE

## 2025-06-16 ENCOUNTER — TELEPHONE (OUTPATIENT)
Dept: HEMATOLOGY/ONCOLOGY | Facility: CLINIC | Age: 70
End: 2025-06-16
Payer: MEDICARE

## 2025-06-16 ENCOUNTER — TELEPHONE (OUTPATIENT)
Dept: NEPHROLOGY | Facility: CLINIC | Age: 70
End: 2025-06-16
Payer: MEDICARE

## 2025-06-16 DIAGNOSIS — I10 HYPERTENSION, UNSPECIFIED TYPE: ICD-10-CM

## 2025-06-16 DIAGNOSIS — M54.17 LUMBOSACRAL RADICULOPATHY: ICD-10-CM

## 2025-06-16 DIAGNOSIS — D64.9 ANEMIA: ICD-10-CM

## 2025-06-16 DIAGNOSIS — R07.9 CHEST PAIN: ICD-10-CM

## 2025-06-16 DIAGNOSIS — N17.9 AKI (ACUTE KIDNEY INJURY): ICD-10-CM

## 2025-06-16 DIAGNOSIS — R53.1 GENERALIZED WEAKNESS: Primary | ICD-10-CM

## 2025-06-16 PROBLEM — J44.9 COPD (CHRONIC OBSTRUCTIVE PULMONARY DISEASE): Chronic | Status: ACTIVE | Noted: 2025-06-16

## 2025-06-16 PROBLEM — N18.4 STAGE 4 CHRONIC KIDNEY DISEASE: Chronic | Status: ACTIVE | Noted: 2025-06-16

## 2025-06-16 LAB
INDIRECT COOMBS: NORMAL
POCT GLUCOSE: 171 MG/DL (ref 70–110)
POCT GLUCOSE: 178 MG/DL (ref 70–110)
RH BLD: NORMAL
SPECIMEN OUTDATE: NORMAL

## 2025-06-16 PROCEDURE — 86901 BLOOD TYPING SEROLOGIC RH(D): CPT

## 2025-06-16 PROCEDURE — 86920 COMPATIBILITY TEST SPIN: CPT

## 2025-06-16 PROCEDURE — 25000003 PHARM REV CODE 250

## 2025-06-16 PROCEDURE — G0379 DIRECT REFER HOSPITAL OBSERV: HCPCS

## 2025-06-16 PROCEDURE — G0378 HOSPITAL OBSERVATION PER HR: HCPCS

## 2025-06-16 PROCEDURE — 96372 THER/PROPH/DIAG INJ SC/IM: CPT

## 2025-06-16 PROCEDURE — 36415 COLL VENOUS BLD VENIPUNCTURE: CPT

## 2025-06-16 PROCEDURE — 63600175 PHARM REV CODE 636 W HCPCS

## 2025-06-16 RX ORDER — INSULIN ASPART 100 [IU]/ML
0-5 INJECTION, SOLUTION INTRAVENOUS; SUBCUTANEOUS
Status: DISCONTINUED | OUTPATIENT
Start: 2025-06-16 | End: 2025-06-17 | Stop reason: HOSPADM

## 2025-06-16 RX ORDER — HYDROCHLOROTHIAZIDE 12.5 MG/1
12.5 TABLET ORAL DAILY
Status: DISCONTINUED | OUTPATIENT
Start: 2025-06-17 | End: 2025-06-17 | Stop reason: HOSPADM

## 2025-06-16 RX ORDER — METOPROLOL SUCCINATE 50 MG/1
100 TABLET, EXTENDED RELEASE ORAL DAILY
Status: DISCONTINUED | OUTPATIENT
Start: 2025-06-17 | End: 2025-06-17

## 2025-06-16 RX ORDER — ACETAMINOPHEN 500 MG
1000 TABLET ORAL EVERY 8 HOURS PRN
Status: DISCONTINUED | OUTPATIENT
Start: 2025-06-16 | End: 2025-06-17 | Stop reason: HOSPADM

## 2025-06-16 RX ORDER — FLUTICASONE FUROATE AND VILANTEROL 100; 25 UG/1; UG/1
1 POWDER RESPIRATORY (INHALATION) DAILY
Status: DISCONTINUED | OUTPATIENT
Start: 2025-06-17 | End: 2025-06-17 | Stop reason: HOSPADM

## 2025-06-16 RX ORDER — ALPRAZOLAM 0.5 MG/1
1 TABLET ORAL 2 TIMES DAILY PRN
Status: DISCONTINUED | OUTPATIENT
Start: 2025-06-16 | End: 2025-06-17 | Stop reason: HOSPADM

## 2025-06-16 RX ORDER — HYDROCODONE BITARTRATE AND ACETAMINOPHEN 500; 5 MG/1; MG/1
TABLET ORAL
Status: DISCONTINUED | OUTPATIENT
Start: 2025-06-16 | End: 2025-06-17 | Stop reason: HOSPADM

## 2025-06-16 RX ORDER — IPRATROPIUM BROMIDE AND ALBUTEROL SULFATE 2.5; .5 MG/3ML; MG/3ML
3 SOLUTION RESPIRATORY (INHALATION) EVERY 6 HOURS PRN
Status: DISCONTINUED | OUTPATIENT
Start: 2025-06-16 | End: 2025-06-17 | Stop reason: HOSPADM

## 2025-06-16 RX ORDER — AMLODIPINE BESYLATE 2.5 MG/1
2.5 TABLET ORAL DAILY
Status: DISCONTINUED | OUTPATIENT
Start: 2025-06-17 | End: 2025-06-17

## 2025-06-16 RX ORDER — SODIUM CHLORIDE 0.9 % (FLUSH) 0.9 %
10 SYRINGE (ML) INJECTION EVERY 12 HOURS PRN
Status: DISCONTINUED | OUTPATIENT
Start: 2025-06-16 | End: 2025-06-17 | Stop reason: HOSPADM

## 2025-06-16 RX ORDER — GLUCAGON 1 MG
1 KIT INJECTION
Status: DISCONTINUED | OUTPATIENT
Start: 2025-06-16 | End: 2025-06-17 | Stop reason: HOSPADM

## 2025-06-16 RX ORDER — NALOXONE HCL 0.4 MG/ML
0.02 VIAL (ML) INJECTION
Status: DISCONTINUED | OUTPATIENT
Start: 2025-06-16 | End: 2025-06-17 | Stop reason: HOSPADM

## 2025-06-16 RX ORDER — IBUPROFEN 200 MG
24 TABLET ORAL
Status: DISCONTINUED | OUTPATIENT
Start: 2025-06-16 | End: 2025-06-17 | Stop reason: HOSPADM

## 2025-06-16 RX ORDER — LOSARTAN POTASSIUM 50 MG/1
100 TABLET ORAL DAILY
Status: DISCONTINUED | OUTPATIENT
Start: 2025-06-17 | End: 2025-06-17 | Stop reason: HOSPADM

## 2025-06-16 RX ORDER — IBUPROFEN 200 MG
16 TABLET ORAL
Status: DISCONTINUED | OUTPATIENT
Start: 2025-06-16 | End: 2025-06-17 | Stop reason: HOSPADM

## 2025-06-16 RX ORDER — ATORVASTATIN CALCIUM 40 MG/1
40 TABLET, FILM COATED ORAL DAILY
Status: DISCONTINUED | OUTPATIENT
Start: 2025-06-17 | End: 2025-06-17 | Stop reason: HOSPADM

## 2025-06-16 RX ORDER — INSULIN GLARGINE 100 [IU]/ML
16 INJECTION, SOLUTION SUBCUTANEOUS NIGHTLY
Status: DISCONTINUED | OUTPATIENT
Start: 2025-06-16 | End: 2025-06-17

## 2025-06-16 RX ADMIN — ACETAMINOPHEN 1000 MG: 500 TABLET ORAL at 09:06

## 2025-06-16 RX ADMIN — INSULIN GLARGINE 16 UNITS: 100 INJECTION, SOLUTION SUBCUTANEOUS at 10:06

## 2025-06-16 NOTE — PLAN OF CARE
Hospital Medicine Direct Admission    Dr Marcia Gottlieb and Dr Barillas from nephrology reached out to me to request a direct admission for anemia. Last Hb 7.4. Has a scheduled kidney biopsy tomorrow 6/17 and pt will need transfusion before procedure tomorrow, with plan to keep her until the next morning.     Interventional nephrology requesting 1 unit prbc, CBC, PT/INR. Keep NPO at MN for kidney bx tomorrow    Upon pt's arrival to the floor, nursing staff may send a secure chat to [facility], and a hospital medicine team and attending will be assigned    Paul Barrientos DO  Department of [facility] Medicine

## 2025-06-16 NOTE — TELEPHONE ENCOUNTER
Returned call to patient's daughter. Patient being admitted for blood products tonight in anticipation for biopsy tomorrow

## 2025-06-16 NOTE — TELEPHONE ENCOUNTER
Copied from CRM #[id number]. Topic: General Inquiry - Patient Advice  >> Jun 16, 2025  1:00 PM Nicolette wrote:  Type:  Needs Medical Advice    Who Called:  Pt  Daughter   Symptoms (please be specific):     How long has patient had these symptoms:     Pharmacy name and phone #:     Would the patient rather a call back or a response via [facility]?  Call  back  Best Call Back Number: [phone]  Nisreen   Additional Information:   Pt daughter  would  like a call  back regard labs and appt 6/17

## 2025-06-17 ENCOUNTER — TELEPHONE (OUTPATIENT)
Dept: NEPHROLOGY | Facility: CLINIC | Age: 70
End: 2025-06-17
Payer: MEDICARE

## 2025-06-17 ENCOUNTER — PATIENT MESSAGE (OUTPATIENT)
Dept: NEPHROLOGY | Facility: CLINIC | Age: 70
End: 2025-06-17
Payer: MEDICARE

## 2025-06-17 VITALS
SYSTOLIC BLOOD PRESSURE: 151 MMHG | OXYGEN SATURATION: 99 % | TEMPERATURE: 98 F | DIASTOLIC BLOOD PRESSURE: 76 MMHG | RESPIRATION RATE: 16 BRPM | HEART RATE: 67 BPM

## 2025-06-17 LAB
ABO + RH BLD: NORMAL
ABSOLUTE EOSINOPHIL (OHS): 0.09 K/UL
ABSOLUTE EOSINOPHIL (OHS): 0.1 K/UL
ABSOLUTE MONOCYTE (OHS): 0.31 K/UL (ref 0.3–1)
ABSOLUTE MONOCYTE (OHS): 0.31 K/UL (ref 0.3–1)
ABSOLUTE NEUTROPHIL COUNT (OHS): 3.68 K/UL (ref 1.8–7.7)
ABSOLUTE NEUTROPHIL COUNT (OHS): 3.69 K/UL (ref 1.8–7.7)
ALBUMIN SERPL BCP-MCNC: 2.8 G/DL (ref 3.5–5.2)
ALP SERPL-CCNC: 129 UNIT/L (ref 40–150)
ALT SERPL W/O P-5'-P-CCNC: 8 UNIT/L (ref 10–44)
ANION GAP (OHS): 6 MMOL/L (ref 8–16)
APTT PPP: 26.8 SECONDS (ref 21–32)
AST SERPL-CCNC: 17 UNIT/L (ref 11–45)
BASOPHILS # BLD AUTO: 0.01 K/UL
BASOPHILS # BLD AUTO: 0.01 K/UL
BASOPHILS NFR BLD AUTO: 0.2 %
BASOPHILS NFR BLD AUTO: 0.2 %
BILIRUB SERPL-MCNC: 0.3 MG/DL (ref 0.1–1)
BLD PROD TYP BPU: NORMAL
BLOOD UNIT EXPIRATION DATE: NORMAL
BLOOD UNIT TYPE CODE: 1700
BUN SERPL-MCNC: 21 MG/DL (ref 8–23)
CALCIUM SERPL-MCNC: 8 MG/DL (ref 8.7–10.5)
CHLORIDE SERPL-SCNC: 118 MMOL/L (ref 95–110)
CHOLEST SERPL-MCNC: 134 MG/DL (ref 120–199)
CHOLEST/HDLC SERPL: 2.7 {RATIO} (ref 2–5)
CO2 SERPL-SCNC: 20 MMOL/L (ref 23–29)
CREAT SERPL-MCNC: 2.1 MG/DL (ref 0.5–1.4)
CROSSMATCH INTERPRETATION: NORMAL
DISPENSE STATUS: NORMAL
EAG (OHS): 151 MG/DL (ref 68–131)
ERYTHROCYTE [DISTWIDTH] IN BLOOD BY AUTOMATED COUNT: 14.6 % (ref 11.5–14.5)
ERYTHROCYTE [DISTWIDTH] IN BLOOD BY AUTOMATED COUNT: 15.6 % (ref 11.5–14.5)
GFR SERPLBLD CREATININE-BSD FMLA CKD-EPI: 25 ML/MIN/1.73/M2
GLUCOSE SERPL-MCNC: 67 MG/DL (ref 70–110)
HBA1C MFR BLD: 6.9 % (ref 4–5.6)
HCT VFR BLD AUTO: 21.3 % (ref 37–48.5)
HCT VFR BLD AUTO: 25.6 % (ref 37–48.5)
HDLC SERPL-MCNC: 50 MG/DL (ref 40–75)
HDLC SERPL: 37.3 % (ref 20–50)
HGB BLD-MCNC: 6.7 GM/DL (ref 12–16)
HGB BLD-MCNC: 8.2 GM/DL (ref 12–16)
IMM GRANULOCYTES # BLD AUTO: 0.04 K/UL (ref 0–0.04)
IMM GRANULOCYTES # BLD AUTO: 0.04 K/UL (ref 0–0.04)
IMM GRANULOCYTES NFR BLD AUTO: 0.6 % (ref 0–0.5)
IMM GRANULOCYTES NFR BLD AUTO: 0.7 % (ref 0–0.5)
INR PPP: 1 (ref 0.8–1.2)
LDLC SERPL CALC-MCNC: 69.8 MG/DL (ref 63–159)
LYMPHOCYTES # BLD AUTO: 2.01 K/UL (ref 1–4.8)
LYMPHOCYTES # BLD AUTO: 2.09 K/UL (ref 1–4.8)
MAGNESIUM SERPL-MCNC: 1.8 MG/DL (ref 1.6–2.6)
MCH RBC QN AUTO: 28.5 PG (ref 27–31)
MCH RBC QN AUTO: 29 PG (ref 27–31)
MCHC RBC AUTO-ENTMCNC: 31.5 G/DL (ref 32–36)
MCHC RBC AUTO-ENTMCNC: 32 G/DL (ref 32–36)
MCV RBC AUTO: 89 FL (ref 82–98)
MCV RBC AUTO: 92 FL (ref 82–98)
NONHDLC SERPL-MCNC: 84 MG/DL
NUCLEATED RBC (/100WBC) (OHS): 0 /100 WBC
NUCLEATED RBC (/100WBC) (OHS): 0 /100 WBC
PLATELET # BLD AUTO: 140 K/UL (ref 150–450)
PLATELET # BLD AUTO: 149 K/UL (ref 150–450)
PMV BLD AUTO: 11.3 FL (ref 9.2–12.9)
PMV BLD AUTO: 11.3 FL (ref 9.2–12.9)
POCT GLUCOSE: 115 MG/DL (ref 70–110)
POCT GLUCOSE: 64 MG/DL (ref 70–110)
POCT GLUCOSE: 94 MG/DL (ref 70–110)
POTASSIUM SERPL-SCNC: 3.5 MMOL/L (ref 3.5–5.1)
PROT SERPL-MCNC: 6.3 GM/DL (ref 6–8.4)
PROTHROMBIN TIME: 11.4 SECONDS (ref 9–12.5)
RBC # BLD AUTO: 2.31 M/UL (ref 4–5.4)
RBC # BLD AUTO: 2.88 M/UL (ref 4–5.4)
RELATIVE EOSINOPHIL (OHS): 1.5 %
RELATIVE EOSINOPHIL (OHS): 1.6 %
RELATIVE LYMPHOCYTE (OHS): 32.7 % (ref 18–48)
RELATIVE LYMPHOCYTE (OHS): 33.5 % (ref 18–48)
RELATIVE MONOCYTE (OHS): 5 % (ref 4–15)
RELATIVE MONOCYTE (OHS): 5 % (ref 4–15)
RELATIVE NEUTROPHIL (OHS): 59.1 % (ref 38–73)
RELATIVE NEUTROPHIL (OHS): 59.9 % (ref 38–73)
SODIUM SERPL-SCNC: 144 MMOL/L (ref 136–145)
TRIGL SERPL-MCNC: 71 MG/DL (ref 30–150)
UNIT NUMBER: NORMAL
WBC # BLD AUTO: 6.15 K/UL (ref 3.9–12.7)
WBC # BLD AUTO: 6.23 K/UL (ref 3.9–12.7)

## 2025-06-17 PROCEDURE — 25000003 PHARM REV CODE 250

## 2025-06-17 PROCEDURE — 36415 COLL VENOUS BLD VENIPUNCTURE: CPT

## 2025-06-17 PROCEDURE — 85730 THROMBOPLASTIN TIME PARTIAL: CPT

## 2025-06-17 PROCEDURE — 25000003 PHARM REV CODE 250: Performed by: INTERNAL MEDICINE

## 2025-06-17 PROCEDURE — P9016 RBC LEUKOCYTES REDUCED: HCPCS

## 2025-06-17 PROCEDURE — 83735 ASSAY OF MAGNESIUM: CPT

## 2025-06-17 PROCEDURE — 94640 AIRWAY INHALATION TREATMENT: CPT | Mod: XB

## 2025-06-17 PROCEDURE — 99900035 HC TECH TIME PER 15 MIN (STAT)

## 2025-06-17 PROCEDURE — 63600175 PHARM REV CODE 636 W HCPCS: Performed by: INTERNAL MEDICINE

## 2025-06-17 PROCEDURE — 85025 COMPLETE CBC W/AUTO DIFF WBC: CPT | Mod: 91 | Performed by: INTERNAL MEDICINE

## 2025-06-17 PROCEDURE — 82465 ASSAY BLD/SERUM CHOLESTEROL: CPT

## 2025-06-17 PROCEDURE — 94761 N-INVAS EAR/PLS OXIMETRY MLT: CPT

## 2025-06-17 PROCEDURE — 84075 ASSAY ALKALINE PHOSPHATASE: CPT

## 2025-06-17 PROCEDURE — 36430 TRANSFUSION BLD/BLD COMPNT: CPT

## 2025-06-17 PROCEDURE — 25000242 PHARM REV CODE 250 ALT 637 W/ HCPCS

## 2025-06-17 PROCEDURE — 85610 PROTHROMBIN TIME: CPT

## 2025-06-17 PROCEDURE — 83036 HEMOGLOBIN GLYCOSYLATED A1C: CPT

## 2025-06-17 PROCEDURE — G0378 HOSPITAL OBSERVATION PER HR: HCPCS

## 2025-06-17 PROCEDURE — 85025 COMPLETE CBC W/AUTO DIFF WBC: CPT

## 2025-06-17 PROCEDURE — 36415 COLL VENOUS BLD VENIPUNCTURE: CPT | Performed by: INTERNAL MEDICINE

## 2025-06-17 RX ORDER — LANCETS 33 GAUGE
EACH MISCELLANEOUS
COMMUNITY
Start: 2025-04-28

## 2025-06-17 RX ORDER — INSULIN GLARGINE 100 [IU]/ML
10 INJECTION, SOLUTION SUBCUTANEOUS NIGHTLY
Status: DISCONTINUED | OUTPATIENT
Start: 2025-06-17 | End: 2025-06-17 | Stop reason: HOSPADM

## 2025-06-17 RX ORDER — POLYETHYLENE GLYCOL 3350 17 G/17G
17 POWDER, FOR SOLUTION ORAL DAILY
COMMUNITY

## 2025-06-17 RX ORDER — NALOXONE HYDROCHLORIDE 4 MG/.1ML
1 SPRAY NASAL
COMMUNITY
Start: 2025-01-07

## 2025-06-17 RX ORDER — NIFEDIPINE 30 MG/1
30 TABLET, EXTENDED RELEASE ORAL DAILY
Status: DISCONTINUED | OUTPATIENT
Start: 2025-06-17 | End: 2025-06-17 | Stop reason: HOSPADM

## 2025-06-17 RX ORDER — CLOPIDOGREL BISULFATE 75 MG/1
75 TABLET ORAL DAILY
Start: 2025-06-17 | End: 2026-06-17

## 2025-06-17 RX ORDER — FLUTICASONE FUROATE, UMECLIDINIUM BROMIDE AND VILANTEROL TRIFENATATE 100; 62.5; 25 UG/1; UG/1; UG/1
1 POWDER RESPIRATORY (INHALATION) DAILY
Start: 2025-06-17

## 2025-06-17 RX ORDER — LORAZEPAM 0.5 MG/1
0.5 TABLET ORAL ONCE
Status: COMPLETED | OUTPATIENT
Start: 2025-06-17 | End: 2025-06-17

## 2025-06-17 RX ORDER — CLONIDINE HYDROCHLORIDE 0.1 MG/1
0.1 TABLET ORAL ONCE
Status: DISCONTINUED | OUTPATIENT
Start: 2025-06-17 | End: 2025-06-17

## 2025-06-17 RX ORDER — FUROSEMIDE 10 MG/ML
80 INJECTION INTRAMUSCULAR; INTRAVENOUS ONCE
Status: COMPLETED | OUTPATIENT
Start: 2025-06-17 | End: 2025-06-17

## 2025-06-17 RX ORDER — CARVEDILOL 25 MG/1
25 TABLET ORAL 2 TIMES DAILY
Status: DISCONTINUED | OUTPATIENT
Start: 2025-06-17 | End: 2025-06-17 | Stop reason: HOSPADM

## 2025-06-17 RX ORDER — HYDROCODONE BITARTRATE AND ACETAMINOPHEN 500; 5 MG/1; MG/1
TABLET ORAL
Status: DISCONTINUED | OUTPATIENT
Start: 2025-06-17 | End: 2025-06-17 | Stop reason: HOSPADM

## 2025-06-17 RX ORDER — INSULIN GLARGINE 100 [IU]/ML
16 INJECTION, SOLUTION SUBCUTANEOUS NIGHTLY
Start: 2025-06-17

## 2025-06-17 RX ORDER — AMLODIPINE BESYLATE 2.5 MG/1
7.5 TABLET ORAL DAILY
Qty: 90 TABLET | Refills: 5 | Status: SHIPPED | OUTPATIENT
Start: 2025-06-17 | End: 2025-06-25

## 2025-06-17 RX ADMIN — ALPRAZOLAM 1 MG: 0.5 TABLET ORAL at 12:06

## 2025-06-17 RX ADMIN — ATORVASTATIN CALCIUM 40 MG: 40 TABLET, FILM COATED ORAL at 09:06

## 2025-06-17 RX ADMIN — FUROSEMIDE 80 MG: 10 INJECTION, SOLUTION INTRAVENOUS at 10:06

## 2025-06-17 RX ADMIN — LORAZEPAM 0.5 MG: 0.5 TABLET ORAL at 10:06

## 2025-06-17 RX ADMIN — NIFEDIPINE 30 MG: 30 TABLET, FILM COATED, EXTENDED RELEASE ORAL at 09:06

## 2025-06-17 RX ADMIN — LOSARTAN POTASSIUM 100 MG: 50 TABLET, FILM COATED ORAL at 09:06

## 2025-06-17 RX ADMIN — CARVEDILOL 25 MG: 25 TABLET, FILM COATED ORAL at 01:06

## 2025-06-17 RX ADMIN — FLUTICASONE FUROATE AND VILANTEROL TRIFENATATE 1 PUFF: 100; 25 POWDER RESPIRATORY (INHALATION) at 01:06

## 2025-06-17 RX ADMIN — HYDROCHLOROTHIAZIDE 12.5 MG: 12.5 TABLET ORAL at 09:06

## 2025-06-17 RX ADMIN — Medication 16 G: at 09:06

## 2025-06-17 RX ADMIN — TIOTROPIUM BROMIDE INHALATION SPRAY 2 PUFF: 3.12 SPRAY, METERED RESPIRATORY (INHALATION) at 09:06

## 2025-06-17 RX ADMIN — METOPROLOL SUCCINATE 100 MG: 50 TABLET, EXTENDED RELEASE ORAL at 09:06

## 2025-06-17 NOTE — PLAN OF CARE
06/17/2025      Soial Hairston  [address]  [place] [address]          Hospital Medicine Dept.  [facility] [address] [place] [id number] [phone] after hours  [phone] fax Soila Hairston has been hospitalized at the [facility] since 6/16/2025.  Her daughter, Nisreen Hairston, was at bedside.      Please contact me if you have any questions.            __________________________  Sharri Dang MD  06/17/2025            1 Principal Discharge DX:	Acute asthma exacerbation

## 2025-06-17 NOTE — ASSESSMENT & PLAN NOTE
-- continue home statin on admission  -- continue to hold plavix, anticipate resumption 2 days post-bx

## 2025-06-17 NOTE — NURSING
AVS virtually reviewed with patient  and daughter in its entirety with emphasis on diet, medications, follow-up appointments and reasons to return to the ED. Patient also encouraged to utilize their patient portal. Ease and convenience of use reiterated. Education complete and patient voiced understanding. All questions answered. Discharge teaching complete.

## 2025-06-17 NOTE — H&P
[facility] - Internal Medicine [facility] Medicine  History & Physical    Patient Name: Soila Hairston  MRN: [id number]  Patient Class: OP- Observation  Admission Date: 6/16/2025  Attending Physician: Sharri Dang MD   Primary Care Provider: Jc Lockhart MD         Patient information was obtained from patient, relative(s), past medical records, and ER records.     Subjective:     Principal Problem:Stage 4 chronic kidney disease    Chief Complaint: No chief complaint on file.       HPI: Ms. 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 Pt is scheduled for a renal bx tomorrow 6/17. Pre-procedure labwork performed 6/12 noted a Hgb of 7.4 (BL appears around 8). Pt was informed by interventional nephrology team that she would need a blood transfusion prior to her bx. Pt direct admitted for continued management. On provider evaluation, patient afebrile, HDS, JUAN M. Pt denies any acute complaints.     Past Medical History:   Diagnosis Date    Anticoagulant long-term use     Colitis     Colon polyp     Depression     Diverticulosis of colon     Foot drop     a couple weeks ago    Hemorrhoids     SOB (shortness of breath)        Past Surgical History:   Procedure Laterality Date    ABSCESS DRAINAGE Right     breast    COLONOSCOPY N/A 10/18/2019    Procedure: COLONOSCOPY2 day golytely;  Surgeon: Adryan Schultz MD;  Location: [facility];  Service: General;  Laterality: N/A;    EPIDURAL STEROID INJECTION      ESOPHAGOGASTRODUODENOSCOPY N/A 10/18/2019    Procedure: EGD (ESOPHAGOGASTRODUODENOSCOPY);  Surgeon: Adryan Schultz MD;  Location: [facility];  Service: General;  Laterality: N/A;    TRANSFORAMINAL EPIDURAL INJECTION OF STEROID Left 2/20/2019    Procedure: INJECTION, STEROID, EPIDURAL, TRANSFORAMINAL APPROACH [3999];  Surgeon: Ad Patel III, MD;  Location: [facility];  Service:  "Pain Management;  Laterality: Left;  L5/S1       Review of patient's allergies indicates:  No Known Allergies    Current Facility-Administered Medications on File Prior to Encounter   Medication    [COMPLETED] epoetin winston-epbx injection 20,000 Units     Current Outpatient Medications on File Prior to Encounter   Medication Sig    allopurinoL (ZYLOPRIM) 100 MG tablet Take 100 mg by mouth once daily. Take 1 tablet by mouth daily    alprazolam (XANAX) 2 MG Tab Take 1 tablet (2 mg total) by mouth 2 (two) times daily as needed. Take 1/2 tablet by mouth two times daily as needed.    amLODIPine (NORVASC) 2.5 MG tablet Take 1 tablet (2.5 mg total) by mouth once daily.    atorvastatin (LIPITOR) 10 MG tablet Take 4 tablets (40 mg total) by mouth once daily.    BD INSULIN PEN NEEDLE UF MINI 31 gauge x 3/16" Ndle     BEVESPI AEROSPHERE 9-4.8 mcg HFAA  (Patient not taking: Reported on 4/21/2025)    clopidogreL (PLAVIX) 75 mg tablet Take 1 tablet (75 mg total) by mouth once daily.    empagliflozin (JARDIANCE) 25 mg tablet Take 25 mg by mouth once daily. Take one tablet every morning    ergocalciferol (ERGOCALCIFEROL) 50,000 unit Cap Take 50,000 Units by mouth every 7 days.    glipiZIDE 5 MG TR24 Take 5 mg by mouth daily with breakfast. Taking one in the morning and one at night (Patient not taking: Reported on 4/21/2025)    LANTUS SOLOSTAR 100 unit/mL (3 mL) InPn pen Inject 40 Units into the skin once daily. (Patient taking differently: Inject 16 Units into the skin every evening.)    levocetirizine (XYZAL) 5 MG tablet Take 5 mg by mouth every evening.    losartan (COZAAR) 100 MG tablet TAKE 1 TABLET (100 MG TOTAL) BY MOUTH ONCE DAILY.    metoprolol succinate (TOPROL-XL) 100 MG 24 hr tablet TAKE 1 TABLET (100 MG TOTAL) BY MOUTH ONCE DAILY.    mirabegron (MYRBETRIQ) 50 mg Tb24 Take 1 tablet by mouth once daily. (Patient not taking: Reported on 7/29/2024)    oxyCODONE-acetaminophen (PERCOCET)  mg per tablet Take 1 tablet by " mouth every 4 (four) hours as needed for Pain.    pantoprazole (PROTONIX) 40 MG tablet TAKE 1 TABLET EVERY DAY    simvastatin (ZOCOR) 40 MG tablet Take 40 mg by mouth every evening. Take one tablet by mouth every at bedtime    TRUETEST TEST STRIPS Strp      Family History       Problem Relation (Age of Onset)    Breast cancer Mother    Dementia Mother          Tobacco Use    Smoking status: Some Days     Current packs/day: 0.25     Average packs/day: 0.3 packs/day for 40.0 years (10.0 ttl pk-yrs)     Types: Cigarettes    Smokeless tobacco: Never    Tobacco comments:     smoke 5 cigs a day   Substance and Sexual Activity    Alcohol use: No     Alcohol/week: 0.0 standard drinks of alcohol    Drug use: No    Sexual activity: Not Currently     Review of Systems   Constitutional:  Negative for chills, fatigue and fever.   Respiratory:  Negative for cough and shortness of breath.    Gastrointestinal:  Negative for blood in stool, diarrhea, nausea and vomiting.     Objective:     Vital Signs (Most Recent):  Temp: 98.3 °F (36.8 °C) (06/16/25 2316)  Pulse: 73 (06/16/25 2316)  Resp: 18 (06/16/25 2316)  BP: (!) 165/85 (06/16/25 2316)  SpO2: 100 % (06/16/25 2316) Vital Signs (24h Range):  Temp:  [98.1 °F (36.7 °C)-98.3 °F (36.8 °C)] 98.3 °F (36.8 °C)  Pulse:  [71-73] 73  Resp:  [18-20] 18  SpO2:  [98 %-100 %] 100 %  BP: (158-165)/(73-91) 165/85        There is no height or weight on file to calculate BMI.     Physical Exam  Vitals reviewed.   Constitutional:       General: She is not in acute distress.     Appearance: Normal appearance.   HENT:      Head: Normocephalic and atraumatic.      Mouth/Throat:      Mouth: Mucous membranes are moist.   Eyes:      Extraocular Movements: Extraocular movements intact.      Pupils: Pupils are equal, round, and reactive to light.   Cardiovascular:      Rate and Rhythm: Normal rate and regular rhythm.      Pulses: Normal pulses.      Heart sounds: Normal heart sounds. No murmur heard.     No  "friction rub. No gallop.   Pulmonary:      Effort: Pulmonary effort is normal. No respiratory distress.      Breath sounds: Normal breath sounds. No wheezing, rhonchi or rales.   Abdominal:      General: Abdomen is flat.      Tenderness: There is no abdominal tenderness.   Musculoskeletal:      Cervical back: Normal range of motion and neck supple.      Right lower leg: Edema (2+ pitting edema) present.      Left lower leg: Edema (2+ pitting edema) present.   Skin:     General: Skin is warm and dry.   Neurological:      Mental Status: She is alert and oriented to person, place, and time.      Comments: R hemiparesis from prior stroke   Psychiatric:         Mood and Affect: Mood normal.         Behavior: Behavior normal.              CRANIAL NERVES     CN III, IV, VI   Pupils are equal, round, and reactive to light.       Significant Labs: All pertinent labs within the past 24 hours have been reviewed.  CBC: No results for input(s): "WBC", "HGB", "HCT", "PLT" in the last 48 hours.  CMP: No results for input(s): "NA", "K", "CL", "CO2", "GLU", "BUN", "CREATININE", "CALCIUM", "PROT", "ALBUMIN", "BILITOT", "ALKPHOS", "AST", "ALT", "ANIONGAP", "EGFRNONAA" in the last 48 hours.    Invalid input(s): "ESTGFAFRICA"    Significant Imaging: I have reviewed all pertinent imaging results/findings within the past 24 hours.  Assessment/Plan:     Assessment & Plan  Stage 4 chronic kidney disease  Creatine stable for now. BMP reviewed- noted CrCl cannot be calculated (Patient's most recent lab result is older than the maximum 7 days allowed.). according to latest data. Based on current GFR, CKD stage is stage 4 - GFR 15-29.  Monitor UOP and serial BMP and adjust therapy as needed. Renally dose meds. Avoid nephrotoxic medications and procedures.    Follows with Dr. Barillas. Pending upcoming kidney bx with Dr. Gottlieb tomorrow 6/17.     -- NPO midnight 6/16  -- interventional nephrology consulted    History of stroke  Right hemiparesis  -- " "Prior stroke with residual R hemiparesis. On plavix/statin at home.   -- holding home plavix prior to renal bx. Recommend discussion with nephrology prior to resumption, likely 2 days post-procedure    Essential hypertension  Patient's blood pressure range in the last 24 hours was: BP  Min: 158/73  Max: 165/85.The patient's inpatient anti-hypertensive regimen is listed below:  Current Antihypertensives  amLODIPine tablet 2.5 mg, Daily, Oral  losartan tablet 100 mg, Daily, Oral  metoprolol succinate (TOPROL-XL) 24 hr tablet 100 mg, Daily, Oral  hydroCHLOROthiazide tablet 12.5 mg, Daily, Oral    Plan  - BP is controlled, no changes needed to their regimen  - plan for strict BP control given upcoming renal bx    Type 2 diabetes mellitus with diabetic nephropathy, unspecified whether long term insulin use  Patient's FSGs are controlled on current medication regimen.  Last A1c reviewed-   Lab Results   Component Value Date    HGBA1C 8.3 (H) 12/03/2024     Most recent fingerstick glucose reviewed-   Recent Labs   Lab 06/16/25  1809 06/16/25  2112   POCTGLUCOSE 178* 171*     Current correctional scale  Low  Maintain anti-hyperglycemic dose as follows-   Antihyperglycemics (From admission, onward)      Start     Stop Route Frequency Ordered    06/16/25 2100  insulin glargine U-100 (Lantus) pen 16 Units         -- SubQ Nightly 06/16/25 1850    06/16/25 1904  insulin aspart U-100 pen 0-5 Units         -- SubQ Before meals & nightly PRN 06/16/25 1805          -- Hold Oral hypoglycemics while patient is in the hospital.  -- repeat A1C ordered on admission  -- continue home Lantus 16u QHS on admit + LDSSI    HLD (hyperlipidemia)  -- continue home statin  -- repeat lipid panel ordered on admission     Iron deficiency anemia  Chronic anemia  Anemia is likely due to Iron deficiency and chronic disease due to Chronic Kidney Disease. Most recent hemoglobin and hematocrit are listed below.  No results for input(s): "HGB", "HCT" in the " last 72 hours.  Plan  - Monitor serial CBC: Daily  - Transfuse PRBC if patient becomes hemodynamically unstable, symptomatic or H/H drops below 7/21.  - Patient has not received any PRBC transfusions to date  - Patient's anemia is currently stable  - pending 1u pRBC transfusion on 6/16 prior to kidney bx     MGUS (monoclonal gammopathy of unknown significance)    Coronary artery disease involving native coronary artery of native heart without angina pectoris  -- continue home statin on admission  -- continue to hold plavix, anticipate resumption 2 days post-bx     COPD (chronic obstructive pulmonary disease)  -- Patient's COPD is controlled currently.  Patient is currently off COPD Pathway. Continue scheduled inhalers and monitor respiratory status closely.   -- Home trelegy not on formulary --> Breo + spiriva ordered  -- prn duo nebs for wheezing  -- recommend pulm referral at DC     VTE Risk Mitigation (From admission, onward)           Ordered     Reason for No Pharmacological VTE Prophylaxis  Once        Question:  Reasons:  Answer:  Risk of Bleeding    06/16/25 1805     IP VTE HIGH RISK PATIENT  Once         06/16/25 1805     Place sequential compression device  Until discontinued         06/16/25 1805                       On 06/17/2025, patient should be placed in hospital observation services under my care.           Joe Brar MD  Department of Hospital Medicine  [facility] - Internal Medicine Telemetry

## 2025-06-17 NOTE — ASSESSMENT & PLAN NOTE
-- Prior stroke with residual R hemiparesis. On plavix/statin at home.   -- holding home plavix prior to renal bx. Recommend discussion with nephrology prior to resumption, likely 2 days post-procedure

## 2025-06-17 NOTE — SUBJECTIVE & OBJECTIVE
"Past Medical History:   Diagnosis Date    Anticoagulant long-term use     Colitis     Colon polyp     Depression     Diverticulosis of colon     Foot drop     a couple weeks ago    Hemorrhoids     SOB (shortness of breath)        Past Surgical History:   Procedure Laterality Date    ABSCESS DRAINAGE Right     breast    COLONOSCOPY N/A 10/18/2019    Procedure: COLONOSCOPY2 day golytely;  Surgeon: Adryan Schultz MD;  Location: [facility] END[facility];  Service: General;  Laterality: N/A;    EPIDURAL STEROID INJECTION      ESOPHAGOGASTRODUODENOSCOPY N/A 10/18/2019    Procedure: EGD (ESOPHAGOGASTRODUODENOSCOPY);  Surgeon: Adryan Schultz MD;  Location: [facility] END[facility];  Service: General;  Laterality: N/A;    TRANSFORAMINAL EPIDURAL INJECTION OF STEROID Left 2/20/2019    Procedure: INJECTION, STEROID, EPIDURAL, TRANSFORAMINAL APPROACH [3999];  Surgeon: Ad Patel III, MD;  Location: [facility] OR;  Service: Pain Management;  Laterality: Left;  L5/S1       Review of patient's allergies indicates:  No Known Allergies    Current Facility-Administered Medications on File Prior to Encounter   Medication    [COMPLETED] epoetin winston-epbx injection 20,000 Units     Current Outpatient Medications on File Prior to Encounter   Medication Sig    allopurinoL (ZYLOPRIM) 100 MG tablet Take 100 mg by mouth once daily. Take 1 tablet by mouth daily    alprazolam (XANAX) 2 MG Tab Take 1 tablet (2 mg total) by mouth 2 (two) times daily as needed. Take 1/2 tablet by mouth two times daily as needed.    amLODIPine (NORVASC) 2.5 MG tablet Take 1 tablet (2.5 mg total) by mouth once daily.    atorvastatin (LIPITOR) 10 MG tablet Take 4 tablets (40 mg total) by mouth once daily.    BD INSULIN PEN NEEDLE UF MINI 31 gauge x 3/16" Ndle     BEVESPI AEROSPHERE 9-4.8 mcg HFAA  (Patient not taking: Reported on 4/21/2025)    clopidogreL (PLAVIX) 75 mg tablet Take 1 tablet (75 mg total) by mouth once daily.    empagliflozin (JARDIANCE) 25 mg tablet Take 25 mg by mouth " once daily. Take one tablet every morning    ergocalciferol (ERGOCALCIFEROL) 50,000 unit Cap Take 50,000 Units by mouth every 7 days.    glipiZIDE 5 MG TR24 Take 5 mg by mouth daily with breakfast. Taking one in the morning and one at night (Patient not taking: Reported on 4/21/2025)    LANTUS SOLOSTAR 100 unit/mL (3 mL) InPn pen Inject 40 Units into the skin once daily. (Patient taking differently: Inject 16 Units into the skin every evening.)    levocetirizine (XYZAL) 5 MG tablet Take 5 mg by mouth every evening.    losartan (COZAAR) 100 MG tablet TAKE 1 TABLET (100 MG TOTAL) BY MOUTH ONCE DAILY.    metoprolol succinate (TOPROL-XL) 100 MG 24 hr tablet TAKE 1 TABLET (100 MG TOTAL) BY MOUTH ONCE DAILY.    mirabegron (MYRBETRIQ) 50 mg Tb24 Take 1 tablet by mouth once daily. (Patient not taking: Reported on 7/29/2024)    oxyCODONE-acetaminophen (PERCOCET)  mg per tablet Take 1 tablet by mouth every 4 (four) hours as needed for Pain.    pantoprazole (PROTONIX) 40 MG tablet TAKE 1 TABLET EVERY DAY    simvastatin (ZOCOR) 40 MG tablet Take 40 mg by mouth every evening. Take one tablet by mouth every at bedtime    TRUETEST TEST STRIPS Strp      Family History       Problem Relation (Age of Onset)    Breast cancer Mother    Dementia Mother          Tobacco Use    Smoking status: Some Days     Current packs/day: 0.25     Average packs/day: 0.3 packs/day for 40.0 years (10.0 ttl pk-yrs)     Types: Cigarettes    Smokeless tobacco: Never    Tobacco comments:     smoke 5 cigs a day   Substance and Sexual Activity    Alcohol use: No     Alcohol/week: 0.0 standard drinks of alcohol    Drug use: No    Sexual activity: Not Currently     Review of Systems   Constitutional:  Negative for chills, fatigue and fever.   Respiratory:  Negative for cough and shortness of breath.    Gastrointestinal:  Negative for blood in stool, diarrhea, nausea and vomiting.     Objective:     Vital Signs (Most Recent):  Temp: 98.3 °F (36.8 °C)  "(06/16/25 2316)  Pulse: 73 (06/16/25 2316)  Resp: 18 (06/16/25 2316)  BP: (!) 165/85 (06/16/25 2316)  SpO2: 100 % (06/16/25 2316) Vital Signs (24h Range):  Temp:  [98.1 °F (36.7 °C)-98.3 °F (36.8 °C)] 98.3 °F (36.8 °C)  Pulse:  [71-73] 73  Resp:  [18-20] 18  SpO2:  [98 %-100 %] 100 %  BP: (158-165)/(73-91) 165/85        There is no height or weight on file to calculate BMI.     Physical Exam  Vitals reviewed.   Constitutional:       General: She is not in acute distress.     Appearance: Normal appearance.   HENT:      Head: Normocephalic and atraumatic.      Mouth/Throat:      Mouth: Mucous membranes are moist.   Eyes:      Extraocular Movements: Extraocular movements intact.      Pupils: Pupils are equal, round, and reactive to light.   Cardiovascular:      Rate and Rhythm: Normal rate and regular rhythm.      Pulses: Normal pulses.      Heart sounds: Normal heart sounds. No murmur heard.     No friction rub. No gallop.   Pulmonary:      Effort: Pulmonary effort is normal. No respiratory distress.      Breath sounds: Normal breath sounds. No wheezing, rhonchi or rales.   Abdominal:      General: Abdomen is flat.      Tenderness: There is no abdominal tenderness.   Musculoskeletal:      Cervical back: Normal range of motion and neck supple.      Right lower leg: Edema (2+ pitting edema) present.      Left lower leg: Edema (2+ pitting edema) present.   Skin:     General: Skin is warm and dry.   Neurological:      Mental Status: She is alert and oriented to person, place, and time.      Comments: R hemiparesis from prior stroke   Psychiatric:         Mood and Affect: Mood normal.         Behavior: Behavior normal.              CRANIAL NERVES     CN III, IV, VI   Pupils are equal, round, and reactive to light.       Significant Labs: All pertinent labs within the past 24 hours have been reviewed.  CBC: No results for input(s): "WBC", "HGB", "HCT", "PLT" in the last 48 hours.  CMP: No results for input(s): "NA", "K", " ""CL", "CO2", "GLU", "BUN", "CREATININE", "CALCIUM", "PROT", "ALBUMIN", "BILITOT", "ALKPHOS", "AST", "ALT", "ANIONGAP", "EGFRNONAA" in the last 48 hours.    Invalid input(s): "ESTGFAFRICA"    Significant Imaging: I have reviewed all pertinent imaging results/findings within the past 24 hours.  "

## 2025-06-17 NOTE — NURSING
AAOx4. Discharged home by personal transportation accompanied by daughter. Educated by vn on discharge, appts and medications. Prescription medications delivered to bedside.

## 2025-06-17 NOTE — PLAN OF CARE
Problem: Adult Inpatient Plan of Care  Goal: Plan of Care Review  Outcome: Adequate for Care Transition  Goal: Patient-Specific Goal (Individualized)  Outcome: Adequate for Care Transition  Goal: Absence of Hospital-Acquired Illness or Injury  Outcome: Adequate for Care Transition  Goal: Optimal Comfort and Wellbeing  Outcome: Adequate for Care Transition  Goal: Readiness for Transition of Care  Outcome: Adequate for Care Transition     Problem: Adult Inpatient Plan of Care  Goal: Plan of Care Review  Outcome: Adequate for Care Transition  Goal: Patient-Specific Goal (Individualized)  Outcome: Adequate for Care Transition  Goal: Absence of Hospital-Acquired Illness or Injury  Outcome: Adequate for Care Transition  Goal: Optimal Comfort and Wellbeing  Outcome: Adequate for Care Transition  Goal: Readiness for Transition of Care  Outcome: Adequate for Care Transition

## 2025-06-17 NOTE — PLAN OF CARE
[facility] - Internal Medicine Telemetry  Discharge Assessment    Primary Care Provider: Jc Lockhart MD     Discharge Assessment (most recent)       BRIEF DISCHARGE ASSESSMENT - 06/17/25 5166          Discharge Planning    Assessment Type Discharge Planning Brief Assessment (P)      Resource/Environmental Concerns none (P)      Support Systems Family members;Children (P)      Equipment Currently Used at Home wheelchair (P)    transport chair    Current Living Arrangements home (P)      Patient/Family Anticipates Transition to home with help/services (P)      Patient/Family Anticipated Services at Transition home health care (P)      DME Needed Upon Discharge  -- (P)    transport chair    Discharge Plan A Home with family;Home Health (P)      Discharge Plan B Home with family (P)         Physical Activity    On average, how many days per week do you engage in moderate to strenuous exercise (like a brisk walk)? 0 days (P)      On average, how many minutes do you engage in exercise at this level? 0 min (P)         Financial Resource Strain    How hard is it for you to pay for the very basics like food, housing, medical care, and heating? Not very hard (P)         Housing Stability    In the last 12 months, was there a time when you were not able to pay the mortgage or rent on time? No (P)      At any time in the past 12 months, were you homeless or living in a shelter (including now)? No (P)         Transportation Needs    In the past 12 months, has lack of transportation kept you from medical appointments or from getting medications? No (P)      In the past 12 months, has lack of transportation kept you from meetings, work, or from getting things needed for daily living? No (P)         Food Insecurity    Within the past 12 months, you worried that your food would run out before you got the money to buy more. Never true (P)      Within the past 12 months, the food you bought just didn't last and you didn't have  money to get more. Never true (P)         Alcohol Use    Q1: How often do you have a drink containing alcohol? Never (P)      Q2: How many drinks containing alcohol do you have on a typical day when you are drinking? Patient does not drink (P)      Q3: How often do you have six or more drinks on one occasion? Never (P)         Utilities    In the past 12 months has the electric, gas, oil, or water company threatened to shut off services in your home? No (P)         Health Literacy    How often do you need to have someone help you when you read instructions, pamphlets, or other written material from your doctor or pharmacy? Sometimes (P)                  Discharge Plan A and Plan B have been determined by review of patient's clinical status, future medical and therapeutic needs, and coverage/benefits for post-acute care in coordination with multidisciplinary team members.                 DIEGO Ndiaye, LMSW  [facility]  Case Management  Ext. [phone]

## 2025-06-17 NOTE — PLAN OF CARE
Discharge Plan A and Plan B have been determined by review of patient's clinical status, future medical and therapeutic needs, and coverage/benefits for post-acute care in coordination with multidisciplinary team members.    06/17/25 1635   Post-Acute Status   Post-Acute Authorization Home Health   Home Health Status Referrals Sent   Hospital Resources/Appts/Education Provided Provided education on problems/symptoms using teachback   Discharge Plan   Discharge Plan A Home with family   Discharge Plan B Home Health;Home with family     SW met with patient and daughter to review discharge recommendation of HH and is agreeable to plan    Patient/family provided list of facilities in-network with patient's payor plan. Providers that are owned, operated, or affiliated with [facility] are included on the list.     Notified that referral sent to below listed facilities from in-network list based on proximity to home/family support:   [facility]   [place]    Patient/family instructed to identify preference.    Preferred Facility: (if more than 1, listed in order of descending preference)  [facility]    If an additional preferred facility not listed above is identified, additional referral to be sent. If above facilities unable to accept, will send additional referrals to in-network providers.      Transport chair DME referral sent to [employer]. Pending review.               DIEGO Ndiaye, LMSW  [facility]  Case Management  Ext. [phone]

## 2025-06-17 NOTE — PLAN OF CARE
[facility]      HOME HEALTH ORDERS  FACE TO FACE ENCOUNTER    Patient Name: Soila Hairston  YOB: 1955    PCP: Jc Lockhart MD   PCP Address: [address] [facility] [address] [place] [address]  PCP Phone Number: [phone]  PCP Fax: [phone]    Encounter Date: 6/16/25    Admit to Home Health    Diagnoses:  Active Diagnoses:    Diagnosis Date Noted POA    PRINCIPAL PROBLEM:  Stage 4 chronic kidney disease [N18.4] 06/16/2025 Yes     Chronic    COPD (chronic obstructive pulmonary disease) [J44.9] 06/16/2025 Yes     Chronic    Coronary artery disease involving native coronary artery of native heart without angina pectoris [I25.10]  Yes    Chronic anemia [D64.9] 09/02/2022 Yes    MGUS (monoclonal gammopathy of unknown significance) [D47.2] 01/04/2022 Yes    Iron deficiency anemia [D50.9] 10/18/2019 Yes    Essential hypertension [I10] 05/10/2016 Yes    Type 2 diabetes mellitus with diabetic nephropathy, unspecified whether long term insulin use [E11.21] 05/10/2016 Yes    HLD (hyperlipidemia) [E78.5] 05/10/2016 Yes    Right hemiparesis [G81.91] 03/04/2016 Yes    History of stroke [Z86.73] 03/04/2016 Not Applicable      Problems Resolved During this Admission:       Follow Up Appointments:  Future Appointments   Date Time Provider Department Center   6/30/2025  1:00 PM [address] [facility]   7/23/2025  2:20 PM Shahbaz Gomez MD [facility] CARDIO [facility]vin [place]       Allergies:Review of patient's allergies indicates:  No Known Allergies    Medications: Review discharge medications with patient and family and provide education.  Current Discharge Medication List        START taking these medications    Details   fluticasone-umeclidin-vilanter (TRELEGY ELLIPTA) 100-62.5-25 mcg DsDv Inhale 1 puff into the lungs once daily.           CONTINUE these medications which have CHANGED    Details   amLODIPine (NORVASC) 2.5 MG tablet Take 3 tablets (7.5 mg total) by mouth once daily.  Qty:  "90 tablet, Refills: 5    Associated Diagnoses: Hypertension, unspecified type      clopidogreL (PLAVIX) 75 mg tablet Take 1 tablet (75 mg total) by mouth once daily.      LANTUS SOLOSTAR U-100 INSULIN 100 unit/mL (3 mL) InPn pen Inject 16 Units into the skin every evening.           CONTINUE these medications which have NOT CHANGED    Details   naloxone (NARCAN) 4 mg/actuation Spry 1 spray by Nasal route as needed.      TRUEPLUS LANCETS 33 gauge Misc       allopurinoL (ZYLOPRIM) 100 MG tablet Take 100 mg by mouth once daily. Take 1 tablet by mouth daily      alprazolam (XANAX) 2 MG Tab Take 1 tablet (2 mg total) by mouth 2 (two) times daily as needed. Take 1/2 tablet by mouth two times daily as needed.  Qty: 28 tablet, Refills: 0      atorvastatin (LIPITOR) 10 MG tablet Take 4 tablets (40 mg total) by mouth once daily.  Qty: 30 tablet, Refills: 0      BD INSULIN PEN NEEDLE UF MINI 31 gauge x 3/16" Ndle       empagliflozin (JARDIANCE) 25 mg tablet Take 25 mg by mouth once daily. Take one tablet every morning      ergocalciferol (ERGOCALCIFEROL) 50,000 unit Cap Take 50,000 Units by mouth every 7 days.      levocetirizine (XYZAL) 5 MG tablet Take 5 mg by mouth every evening.      losartan (COZAAR) 100 MG tablet TAKE 1 TABLET (100 MG TOTAL) BY MOUTH ONCE DAILY.  Qty: 90 tablet, Refills: 3    Comments: .  Associated Diagnoses: Primary hypertension      metoprolol succinate (TOPROL-XL) 100 MG 24 hr tablet TAKE 1 TABLET (100 MG TOTAL) BY MOUTH ONCE DAILY.  Qty: 90 tablet, Refills: 3    Comments: .      oxyCODONE-acetaminophen (PERCOCET)  mg per tablet Take 1 tablet by mouth every 4 (four) hours as needed for Pain.  Qty: 18 tablet, Refills: 0      pantoprazole (PROTONIX) 40 MG tablet TAKE 1 TABLET EVERY DAY  Qty: 90 tablet, Refills: 3      polyethylene glycol (GLYCOLAX) 17 gram PwPk Take 17 g by mouth once daily.      simvastatin (ZOCOR) 40 MG tablet Take 40 mg by mouth every evening. Take one tablet by mouth every at " bedtime      TRUETEST TEST STRIPS Strp       vitamin B complex (B COMPLEX 1 ORAL) Take 1 tablet by mouth once daily.           STOP taking these medications       BEVESPI AEROSPHERE 9-4.8 mcg HFAA Comments:   Reason for Stopping:         glipiZIDE 5 MG TR24 Comments:   Reason for Stopping:         mirabegron (MYRBETRIQ) 50 mg Tb24 Comments:   Reason for Stopping:                 I have seen and examined this patient within the last 30 days. My clinical findings that support the need for the home health skilled services and home bound status are the following:  Patient with medication mismanagement issues requiring home bound status as evidenced by  Unstable vital signs (blood pressure, heart rate).     Diet:   2 gram sodium diet    Activities:   activity as tolerated    Nursing:   Agency to admit patient within 24 hours of hospital discharge unless specified on physician order or at patient request    SN to complete comprehensive assessment including routine vital signs. Instruct on disease process and s/s of complications to report to MD. Review/verify medication list sent home with the patient at time of discharge  and instruct patient/caregiver as needed. Frequency may be adjusted depending on start of care date.     Skilled nurse to perform up to 3 visits PRN for symptoms related to diagnosis    Notify MD if SBP > 160 or < 90; DBP > 90 or < 50; HR > 120 or < 50; Temp > 101; O2 < 88%    Ok to schedule additional visits based on staff availability and patient request on consecutive days within the home health episode.    Wound Care Orders  no    I certify that this patient is confined to her home and needs intermittent skilled nursing care.

## 2025-06-17 NOTE — HPI
Ms. 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 Pt is scheduled for a renal bx tomorrow 6/17. Pre-procedure labwork performed 6/12 noted a Hgb of 7.4 (BL appears around 8). Pt was informed by interventional nephrology team that she would need a blood transfusion prior to her bx. Pt direct admitted for continued management. On provider evaluation, patient afebrile, HDS, JUAN M. Pt denies any acute complaints.

## 2025-06-17 NOTE — ASSESSMENT & PLAN NOTE
"Anemia is likely due to Iron deficiency and chronic disease due to Chronic Kidney Disease. Most recent hemoglobin and hematocrit are listed below.  No results for input(s): "HGB", "HCT" in the last 72 hours.  Plan  - Monitor serial CBC: Daily  - Transfuse PRBC if patient becomes hemodynamically unstable, symptomatic or H/H drops below 7/21.  - Patient has not received any PRBC transfusions to date  - Patient's anemia is currently stable  - pending 1u pRBC transfusion on 6/16 prior to kidney bx     "

## 2025-06-17 NOTE — ASSESSMENT & PLAN NOTE
-- Patient's COPD is controlled currently.  Patient is currently off COPD Pathway. Continue scheduled inhalers and monitor respiratory status closely.   -- Home trelegy not on formulary --> Breo + spiriva ordered  -- prn duo nebs for wheezing  -- recommend pulm referral at DC

## 2025-06-17 NOTE — ASSESSMENT & PLAN NOTE
Patient's blood pressure range in the last 24 hours was: BP  Min: 158/73  Max: 165/85.The patient's inpatient anti-hypertensive regimen is listed below:  Current Antihypertensives  amLODIPine tablet 2.5 mg, Daily, Oral  losartan tablet 100 mg, Daily, Oral  metoprolol succinate (TOPROL-XL) 24 hr tablet 100 mg, Daily, Oral  hydroCHLOROthiazide tablet 12.5 mg, Daily, Oral    Plan  - BP is controlled, no changes needed to their regimen  - plan for strict BP control given upcoming renal bx

## 2025-06-17 NOTE — NURSING
Pt were suppose to get one unit of blood, but Dr. Rosana Velarde advised nurse to hold off until he received a CBC.

## 2025-06-17 NOTE — CARE UPDATE
Interventional Nephrology:     The patient had low Hb 6.4 gm/dl with elevated BP. Her procedure will be cancelled today. The risk of bleeding post kidney biopsy will be high unless we get her BP < 140/90 mmHg and Hb > 8 gm/dl.     She will receive two units of PRBC during this admission and would suggest to control her BP with Ca channel blocker, Betablocker, Loop diuretic and Imdur.  Please avoid the Clonidine because of the rebound effect.     She can be discharged after the blood transfusion and will rebook the procedure as outpatient.     I have explained the updated plan for her and the daughter and mentioned that the kidney biopsy is an elective procedure and not an emergency one, we will not be able to proceed with having high risk.     If you have any question call.         JAX SMITH.Emil. MD. THEODORA. JEREMY.  , [employer] / [employer]  Nephrology Consultant. [facility].   [address] [place] [address]. 5th [place] [address].    email: [email or web address]  Tel: Office: [phone]

## 2025-06-17 NOTE — DISCHARGE INSTRUCTIONS
Our goal at [facility] is to always give you outstanding care and exceptional service. You may receive a survey from [employer] by mail, text or e-mail in the next 24-48 hours asking about the care you received with us. The survey should only take 5-10 minutes to complete and is very important to us.     Your feedback provides us with a way to recognize our staff who work tirelessly to provide the best care! Also, your responses help us learn how to improve when your experience was below our aspiration of excellence. We are always looking for ways to improve your stay. We WILL use your feedback to continue making improvements to help us provide the highest quality care. We keep your personal information and feedback confidential. We appreciate your time completing this survey and can't wait to hear from you!!!    We look forward to your continued care with us! Thanks so much for choosing [facility] for your healthcare needs!

## 2025-06-17 NOTE — ASSESSMENT & PLAN NOTE
Creatine stable for now. BMP reviewed- noted CrCl cannot be calculated (Patient's most recent lab result is older than the maximum 7 days allowed.). according to latest data. Based on current GFR, CKD stage is stage 4 - GFR 15-29.  Monitor UOP and serial BMP and adjust therapy as needed. Renally dose meds. Avoid nephrotoxic medications and procedures.    Follows with Dr. Barillas. Pending upcoming kidney bx with Dr. Gottlieb tomorrow 6/17.     -- NPO midnight 6/16  -- interventional nephrology consulted

## 2025-06-17 NOTE — ASSESSMENT & PLAN NOTE
Patient's FSGs are controlled on current medication regimen.  Last A1c reviewed-   Lab Results   Component Value Date    HGBA1C 8.3 (H) 12/03/2024     Most recent fingerstick glucose reviewed-   Recent Labs   Lab 06/16/25  1809 06/16/25 2112   POCTGLUCOSE 178* 171*     Current correctional scale  Low  Maintain anti-hyperglycemic dose as follows-   Antihyperglycemics (From admission, onward)      Start     Stop Route Frequency Ordered    06/16/25 2100  insulin glargine U-100 (Lantus) pen 16 Units         -- SubQ Nightly 06/16/25 1850    06/16/25 1904  insulin aspart U-100 pen 0-5 Units         -- SubQ Before meals & nightly PRN 06/16/25 1805          -- Hold Oral hypoglycemics while patient is in the hospital.  -- repeat A1C ordered on admission  -- continue home Lantus 16u QHS on admit + LDSSI

## 2025-06-18 DIAGNOSIS — R53.1 GENERALIZED WEAKNESS: Primary | ICD-10-CM

## 2025-06-18 NOTE — PROGRESS NOTES
Soila Hairston has a mobility limitation that significantly impairs her ability to participate in one or more mobility related activities in the community. The mobility limitation cannot be sufficiently resolved by the use of a cane or walker. The use of a transport wheelchair will significantly improve the patient's ability to participate in the community and the patient will use it on regular basis outside the home. Soila Hairston has expressed her willingness to use a transport wheelchair outside the home. She also has a caregiver who is available, willing, and able to provide assistance with the wheelchair when needed.

## 2025-06-18 NOTE — PLAN OF CARE
[facility] Internal Medicine Telemetry  Discharge Final Note    Primary Care Provider: Jc Lockhart MD    Expected Discharge Date: 6/17/2025    Patient discharged to home w/ family + [facility].    Patient discharged home via personal transportation.     Discharge Plan A and Plan B have been determined by review of patient's clinical status, future medical and therapeutic needs, and coverage/benefits for post-acute care in coordination with multidisciplinary team members.        Final Discharge Note (most recent)       Final Note - 06/18/25 1635          Final Note    Assessment Type Final Discharge Note (P)      Anticipated Discharge Disposition Home-Health Care Svc (P)      What phone number can be called within the next 1-3 days to see how you are doing after discharge? [phone] (P)      Hospital Resources/Appts/Education Provided Provided education on problems/symptoms using teachback (P)         Post-Acute Status    Post-Acute Authorization Home Health (P)      Home Health Status Set-up Complete/Auth obtained (P)                      Important Message from Medicare             After-discharge care                Home Medical Care       *[facility]   Service: Home Nursing    [address] [place] [address], [address]  [facility] [address]   Phone: [phone]                       Future Appointments   Date Time Provider Department Center   6/30/2025  1:00 PM CHAIR 04 [facility]   7/23/2025  2:20 PM Shahbaz Gomez MD [facility] CARDIO [place], [address]  [facility]  Case Management  Ext. [phone]

## 2025-06-20 LAB — RBCS: NORMAL

## 2025-06-23 ENCOUNTER — PATIENT MESSAGE (OUTPATIENT)
Dept: NEPHROLOGY | Facility: CLINIC | Age: 70
End: 2025-06-23
Payer: MEDICARE

## 2025-06-24 NOTE — ASSESSMENT & PLAN NOTE
"Patient's FSGs are controlled on current medication regimen.  Last A1c reviewed-   Lab Results   Component Value Date    HGBA1C 6.9 (H) 06/17/2025     Most recent fingerstick glucose reviewed-   No results for input(s): "POCTGLUCOSE" in the last 24 hours.    Current correctional scale  Low  Maintain anti-hyperglycemic dose as follows-   Antihyperglycemics (From admission, onward)      None          -- Hold Oral hypoglycemics while patient is in the hospital.  -- repeat A1C ordered on admission  -- continue home Lantus 16u QHS on admit + LDSSI    "

## 2025-06-24 NOTE — ASSESSMENT & PLAN NOTE
"Anemia is likely due to Iron deficiency and chronic disease due to Chronic Kidney Disease. Most recent hemoglobin and hematocrit are listed below.  No results for input(s): "HGB", "HCT" in the last 72 hours.  Plan  - Monitor serial CBC: Daily  - Transfuse PRBC if patient becomes hemodynamically unstable, symptomatic or H/H drops below 7/21.  - Patient has not received any PRBC transfusions to date  - Patient's anemia is currently stable  - patient received on unit PRBC during her stay  - Dr. Barillas set up further transfusions as an outpatient near her home.     "

## 2025-06-24 NOTE — DISCHARGE SUMMARY
Robert Man - Internal Medicine [facility] Medicine  Discharge Summary      Patient Name: Soila Hairston  MRN: [id number]  [facility]: [id number]  Patient Class: OP- Observation  Admission Date: 6/16/2025  Hospital Length of Stay: 0 days  Discharge Date and Time: 6/17/2025  5:23 PM  Attending Physician: Sharri Dang MD  Discharging Provider: Sharri Dang MD  Primary Care Provider: Jc Lockhart MD  [facility] Medicine Team: [facility] MED Q Sharri Dang MD  Primary Care Team: [facility] MED Q    HPI:   Ms. 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 Pt is scheduled for a renal bx tomorrow 6/17. Pre-procedure labwork performed 6/12 noted a Hgb of 7.4 (BL appears around 8). Pt was informed by interventional nephrology team that she would need a blood transfusion prior to her bx. Pt direct admitted for continued management. On provider evaluation, patient afebrile, HDS, JUAN M. Pt denies any acute complaints.     Hospital Course:   Ms. 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 Pt is scheduled for a renal bx tomorrow 6/17. Pre-procedure labwork performed 6/12 noted a Hgb of 7.4 (BL appears around 8). Pt was informed by interventional nephrology team that she would need a blood transfusion prior to her bx. Pt direct admitted for continued management. On provider evaluation, patient afebrile, HDS, JUAN M. Pt denies any acute complaints.     Goals of Care Treatment Preferences:  Code Status: Full Code    Consults:   nephrology  Assessment & Plan  Stage 4 chronic kidney disease  Creatine stable for now. BMP reviewed- noted CrCl cannot be calculated (Patient's most recent lab result is older than the maximum 7 days allowed.). according to latest data. Based on current GFR, CKD stage is stage  "4 - GFR 15-29.  Monitor UOP and serial BMP and adjust therapy as needed. Renally dose meds. Avoid nephrotoxic medications and procedures.    Follows with Dr. Barillas. Pending upcoming kidney bx with Dr. Gottlieb tomorrow 6/17.     -- interventional nephrology consulted  **Due significant anemia, patient's biopsy was cancelled. We were unable to transfusion the PRBC prior to time available for her to receive the biopsy. There was no other location available for her to get the procedure within our building. It was decided that patient will get her biopsy done as an outpatient in two weeks.  History of stroke  Right hemiparesis  -- Prior stroke with residual R hemiparesis. On plavix/statin at home.   -- holding home plavix prior to renal bx. Recommend discussion with nephrology prior to resumption, likely 2 days post-procedure    Essential hypertension  Patient's blood pressure range in the last 24 hours was: No data recorded.The patient's inpatient anti-hypertensive regimen is listed below:  Current Antihypertensives  amlodipine (NORVASC) tablet, Daily, Oral    Plan  - BP is controlled, no changes needed to their regimen  - plan for strict BP control given upcoming renal bx    Type 2 diabetes mellitus with diabetic nephropathy, unspecified whether long term insulin use  Patient's FSGs are controlled on current medication regimen.  Last A1c reviewed-   Lab Results   Component Value Date    HGBA1C 6.9 (H) 06/17/2025     Most recent fingerstick glucose reviewed-   No results for input(s): "POCTGLUCOSE" in the last 24 hours.    Current correctional scale  Low  Maintain anti-hyperglycemic dose as follows-   Antihyperglycemics (From admission, onward)      None          -- Hold Oral hypoglycemics while patient is in the hospital.  -- repeat A1C ordered on admission  -- continue home Lantus 16u QHS on admit + LDSSI    HLD (hyperlipidemia)  -- continue home statin  -- repeat lipid panel ordered on admission     Iron deficiency " "anemia  Chronic anemia  Anemia is likely due to Iron deficiency and chronic disease due to Chronic Kidney Disease. Most recent hemoglobin and hematocrit are listed below.  No results for input(s): "HGB", "HCT" in the last 72 hours.  Plan  - Monitor serial CBC: Daily  - Transfuse PRBC if patient becomes hemodynamically unstable, symptomatic or H/H drops below 7/21.  - Patient has not received any PRBC transfusions to date  - Patient's anemia is currently stable  - patient received on unit PRBC during her stay  - Dr. Barillas set up further transfusions as an outpatient near her home.     MGUS (monoclonal gammopathy of unknown significance)      Coronary artery disease involving native coronary artery of native heart without angina pectoris  -- continue home statin on admission  -- continue to hold plavix, anticipate resumption 2 days post-bx     COPD (chronic obstructive pulmonary disease)  -- Patient's COPD is controlled currently.  Patient is currently off COPD Pathway. Continue scheduled inhalers and monitor respiratory status closely.   -- Home trelegy not on formulary --> Breo + spiriva ordered  -- prn duo nebs for wheezing  -- recommend pulm referral at [place]     Final Active Diagnoses:    Diagnosis Date Noted POA    PRINCIPAL PROBLEM:  Stage 4 chronic kidney disease [N18.4] 06/16/2025 Yes     Chronic    COPD (chronic obstructive pulmonary disease) [J44.9] 06/16/2025 Yes     Chronic    Coronary artery disease involving native coronary artery of native heart without angina pectoris [I25.10]  Yes    Chronic anemia [D64.9] 09/02/2022 Yes    MGUS (monoclonal gammopathy of unknown significance) [D47.2] 01/04/2022 Yes    Iron deficiency anemia [D50.9] 10/18/2019 Yes    Essential hypertension [I10] 05/10/2016 Yes    Type 2 diabetes mellitus with diabetic nephropathy, unspecified whether long term insulin use [E11.21] 05/10/2016 Yes    HLD (hyperlipidemia) [E78.5] 05/10/2016 Yes    Right hemiparesis [G81.91] 03/04/2016 Yes    " "History of stroke [Z86.73] 03/04/2016 Not Applicable      Problems Resolved During this Admission:       Discharged Condition: good    Disposition: Home or Self Care    Follow Up:   Contact information for after-discharge care       Home Medical Care       [facility] .    Service: Home Nursing  Contact information:  [address]  [facility] [address]  [phone]                                 Patient Instructions:      HME - OTHER     Order Specific Question Answer Comments   Type of Equipment: transport chair    Height: 157 cm    Weight: 88.9 kg    Does patient have medical equipment at home? wheelchair transport chair       Significant Diagnostic Studies: N/A    Pending Diagnostic Studies:       None           Medications:  Reconciled Home Medications:      Medication List        START taking these medications      TRELEGY ELLIPTA 100-62.5-25 mcg Dsdv  Generic drug: fluticasone-umeclidin-vilanter  Inhale 1 puff into the lungs once daily.            CHANGE how you take these medications      amLODIPine 2.5 MG tablet  Commonly known as: NORVASC  Take 3 tablets (7.5 mg total) by mouth once daily.  What changed: how much to take            CONTINUE taking these medications      allopurinoL 100 MG tablet  Commonly known as: ZYLOPRIM  Take 100 mg by mouth once daily. Take 1 tablet by mouth daily     ALPRAZolam 2 MG Tab  Commonly known as: XANAX  Take 1 tablet (2 mg total) by mouth 2 (two) times daily as needed. Take 1/2 tablet by mouth two times daily as needed.     atorvastatin 10 MG tablet  Commonly known as: LIPITOR  Take 4 tablets (40 mg total) by mouth once daily.     B COMPLEX 1 ORAL  Take 1 tablet by mouth once daily.     BD ULTRA-FINE MINI PEN NEEDLE 31 gauge x 3/16" Ndle  Generic drug: pen needle, diabetic     clopidogreL 75 mg tablet  Commonly known as: PLAVIX  Take 1 tablet (75 mg total) by mouth once daily.     ergocalciferol 50,000 unit Cap  Commonly known as: " ERGOCALCIFEROL  Take 50,000 Units by mouth every 7 days.     JARDIANCE 25 mg tablet  Generic drug: empagliflozin  Take 25 mg by mouth once daily. Take one tablet every morning     LANTUS SOLOSTAR U-100 INSULIN 100 unit/mL (3 mL) Inpn pen  Generic drug: insulin glargine U-100 (Lantus)  Inject 16 Units into the skin every evening.     levocetirizine 5 MG tablet  Commonly known as: XYZAL  Take 5 mg by mouth every evening.     losartan 100 MG tablet  Commonly known as: COZAAR  TAKE 1 TABLET (100 MG TOTAL) BY MOUTH ONCE DAILY.     metoprolol succinate 100 MG 24 hr tablet  Commonly known as: TOPROL-XL  TAKE 1 TABLET (100 MG TOTAL) BY MOUTH ONCE DAILY.     naloxone 4 mg/actuation Spry  Commonly known as: NARCAN  1 spray by Nasal route as needed.     oxyCODONE-acetaminophen  mg per tablet  Commonly known as: PERCOCET  Take 1 tablet by mouth every 4 (four) hours as needed for Pain.     pantoprazole 40 MG tablet  Commonly known as: PROTONIX  TAKE 1 TABLET EVERY DAY     polyethylene glycol 17 gram Pwpk  Commonly known as: GLYCOLAX  Take 17 g by mouth once daily.     simvastatin 40 MG tablet  Commonly known as: ZOCOR  Take 40 mg by mouth every evening. Take one tablet by mouth every at bedtime     TRUEPLUS LANCETS 33 gauge Misc  Generic drug: lancets     TRUETEST TEST STRIPS Strp  Generic drug: blood sugar diagnostic            STOP taking these medications      BEVESPI AEROSPHERE 9-4.8 mcg Hfaa  Generic drug: glycopyrrolate-formoteroL     glipiZIDE 5 MG Tr24     mirabegron 50 mg Tb24  Commonly known as: MYRBETRIQ              Indwelling Lines/Drains at time of discharge:   Lines/Drains/Airways       Airway  Duration                  Airway - Non-Surgical Other (Comment) -- days                    SDOH Screening:  The patient was screened for food insecurity, housing instability, transportation needs, utility difficulties, and interpersonal safety. The social determinant(s) of health identified as a concern this admission  are:  Interpersonal Safety    Concerns were discussed with case management and/or community health workers.                 Time spent on the discharge of patient: 35 minutes         Sharri Dang MD  Department of Hospital Medicine  [facility] - Internal Medicine Telemetry

## 2025-06-24 NOTE — ASSESSMENT & PLAN NOTE
Patient's blood pressure range in the last 24 hours was: No data recorded.The patient's inpatient anti-hypertensive regimen is listed below:  Current Antihypertensives  amlodipine (NORVASC) tablet, Daily, Oral    Plan  - BP is controlled, no changes needed to their regimen  - plan for strict BP control given upcoming renal bx

## 2025-06-24 NOTE — DISCHARGE SUMMARY
Robert Man - Internal Medicine [facility] Medicine  Discharge Summary      Patient Name: Soila Hairston  MRN: [id number]  [facility]: [id number]  Patient Class: OP- Observation  Admission Date: 6/16/2025  Hospital Length of Stay: 0 days  Discharge Date and Time: 6/17/2025  5:23 PM  Attending Physician: Sharri Dang MD  Discharging Provider: Sharri Dang MD  Primary Care Provider: Jc Lockhart MD  [facility] Medicine Team: [facility] MED Q Sharri Dang MD  Primary Care Team: [facility] MED Q    HPI:   Ms. 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 Pt is scheduled for a renal bx tomorrow 6/17. Pre-procedure labwork performed 6/12 noted a Hgb of 7.4 (BL appears around 8). Pt was informed by interventional nephrology team that she would need a blood transfusion prior to her bx. Pt direct admitted for continued management. On provider evaluation, patient afebrile, HDS, JUAN M. Pt denies any acute complaints.     Hospital Course:   Ms. 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 Pt is scheduled for a renal bx tomorrow 6/17. Pre-procedure labwork performed 6/12 noted a Hgb of 7.4 (BL appears around 8). Pt was informed by interventional nephrology team that she would need a blood transfusion prior to her bx. Pt direct admitted for continued management. On provider evaluation, patient afebrile, HDS, JUAN M. Pt denies any acute complaints.     Goals of Care Treatment Preferences:  Code Status: Full Code    Consults:   nephrology  Assessment & Plan  Stage 4 chronic kidney disease  Creatine stable for now. BMP reviewed- noted CrCl cannot be calculated (Patient's most recent lab result is older than the maximum 7 days allowed.). according to latest data. Based on current GFR, CKD stage is stage  "4 - GFR 15-29.  Monitor UOP and serial BMP and adjust therapy as needed. Renally dose meds. Avoid nephrotoxic medications and procedures.    Follows with Dr. Barillas. Pending upcoming kidney bx with Dr. Gottlieb tomorrow 6/17.     -- interventional nephrology consulted  **Due significant anemia, patient's biopsy was cancelled. We were unable to transfusion the PRBC prior to time available for her to receive the biopsy. There was no other location available for her to get the procedure within our building. It was decided that patient will get her biopsy done as an outpatient in two weeks.  History of stroke  Right hemiparesis  -- Prior stroke with residual R hemiparesis. On plavix/statin at home.   -- holding home plavix prior to renal bx. Recommend discussion with nephrology prior to resumption, likely 2 days post-procedure    Essential hypertension  Patient's blood pressure range in the last 24 hours was: No data recorded.The patient's inpatient anti-hypertensive regimen is listed below:  Current Antihypertensives  amlodipine (NORVASC) tablet, Daily, Oral    Plan  - BP is controlled, no changes needed to their regimen  - plan for strict BP control given upcoming renal bx    Type 2 diabetes mellitus with diabetic nephropathy, unspecified whether long term insulin use  Patient's FSGs are controlled on current medication regimen.  Last A1c reviewed-   Lab Results   Component Value Date    HGBA1C 6.9 (H) 06/17/2025     Most recent fingerstick glucose reviewed-   No results for input(s): "POCTGLUCOSE" in the last 24 hours.    Current correctional scale  Low  Maintain anti-hyperglycemic dose as follows-   Antihyperglycemics (From admission, onward)      None          -- Hold Oral hypoglycemics while patient is in the hospital.  -- repeat A1C ordered on admission  -- continue home Lantus 16u QHS on admit + LDSSI    HLD (hyperlipidemia)  -- continue home statin  -- repeat lipid panel ordered on admission     Iron deficiency " "anemia  Chronic anemia  Anemia is likely due to Iron deficiency and chronic disease due to Chronic Kidney Disease. Most recent hemoglobin and hematocrit are listed below.  No results for input(s): "HGB", "HCT" in the last 72 hours.  Plan  - Monitor serial CBC: Daily  - Transfuse PRBC if patient becomes hemodynamically unstable, symptomatic or H/H drops below 7/21.  - Patient has not received any PRBC transfusions to date  - Patient's anemia is currently stable  - patient received on unit PRBC during her stay  - Dr. Barillas set up further transfusions as an outpatient near her home.     MGUS (monoclonal gammopathy of unknown significance)      Coronary artery disease involving native coronary artery of native heart without angina pectoris  -- continue home statin on admission  -- continue to hold plavix, anticipate resumption 2 days post-bx     COPD (chronic obstructive pulmonary disease)  -- Patient's COPD is controlled currently.  Patient is currently off COPD Pathway. Continue scheduled inhalers and monitor respiratory status closely.   -- Home trelegy not on formulary --> Breo + spiriva ordered  -- prn duo nebs for wheezing  -- recommend pulm referral at [place]     Final Active Diagnoses:    Diagnosis Date Noted POA    PRINCIPAL PROBLEM:  Stage 4 chronic kidney disease [N18.4] 06/16/2025 Yes     Chronic    COPD (chronic obstructive pulmonary disease) [J44.9] 06/16/2025 Yes     Chronic    Coronary artery disease involving native coronary artery of native heart without angina pectoris [I25.10]  Yes    Chronic anemia [D64.9] 09/02/2022 Yes    MGUS (monoclonal gammopathy of unknown significance) [D47.2] 01/04/2022 Yes    Iron deficiency anemia [D50.9] 10/18/2019 Yes    Essential hypertension [I10] 05/10/2016 Yes    Type 2 diabetes mellitus with diabetic nephropathy, unspecified whether long term insulin use [E11.21] 05/10/2016 Yes    HLD (hyperlipidemia) [E78.5] 05/10/2016 Yes    Right hemiparesis [G81.91] 03/04/2016 Yes    " "History of stroke [Z86.73] 03/04/2016 Not Applicable      Problems Resolved During this Admission:       Discharged Condition: good    Disposition: Home or Self Care    Follow Up:   Contact information for after-discharge care       Home Medical Care       [facility] .    Service: Home Nursing  Contact information:  [address], [address]  [facility] [address]  [phone]                                 Patient Instructions:      HME - OTHER     Order Specific Question Answer Comments   Type of Equipment: transport chair    Height: 157 cm    Weight: 88.9 kg    Does patient have medical equipment at home? wheelchair transport chair       Significant Diagnostic Studies: N/A    Pending Diagnostic Studies:       None           Medications:  Reconciled Home Medications:      Medication List        START taking these medications      TRELEGY ELLIPTA 100-62.5-25 mcg Dsdv  Generic drug: fluticasone-umeclidin-vilanter  Inhale 1 puff into the lungs once daily.            CHANGE how you take these medications      amLODIPine 2.5 MG tablet  Commonly known as: NORVASC  Take 3 tablets (7.5 mg total) by mouth once daily.  What changed: how much to take            CONTINUE taking these medications      allopurinoL 100 MG tablet  Commonly known as: ZYLOPRIM  Take 100 mg by mouth once daily. Take 1 tablet by mouth daily     ALPRAZolam 2 MG Tab  Commonly known as: XANAX  Take 1 tablet (2 mg total) by mouth 2 (two) times daily as needed. Take 1/2 tablet by mouth two times daily as needed.     atorvastatin 10 MG tablet  Commonly known as: LIPITOR  Take 4 tablets (40 mg total) by mouth once daily.     B COMPLEX 1 ORAL  Take 1 tablet by mouth once daily.     BD ULTRA-FINE MINI PEN NEEDLE 31 gauge x 3/16" Ndle  Generic drug: pen needle, diabetic     clopidogreL 75 mg tablet  Commonly known as: PLAVIX  Take 1 tablet (75 mg total) by mouth once daily.     ergocalciferol 50,000 unit Cap  Commonly known as: " ERGOCALCIFEROL  Take 50,000 Units by mouth every 7 days.     JARDIANCE 25 mg tablet  Generic drug: empagliflozin  Take 25 mg by mouth once daily. Take one tablet every morning     LANTUS SOLOSTAR U-100 INSULIN 100 unit/mL (3 mL) Inpn pen  Generic drug: insulin glargine U-100 (Lantus)  Inject 16 Units into the skin every evening.     levocetirizine 5 MG tablet  Commonly known as: XYZAL  Take 5 mg by mouth every evening.     losartan 100 MG tablet  Commonly known as: COZAAR  TAKE 1 TABLET (100 MG TOTAL) BY MOUTH ONCE DAILY.     metoprolol succinate 100 MG 24 hr tablet  Commonly known as: TOPROL-XL  TAKE 1 TABLET (100 MG TOTAL) BY MOUTH ONCE DAILY.     naloxone 4 mg/actuation Spry  Commonly known as: NARCAN  1 spray by Nasal route as needed.     oxyCODONE-acetaminophen  mg per tablet  Commonly known as: PERCOCET  Take 1 tablet by mouth every 4 (four) hours as needed for Pain.     pantoprazole 40 MG tablet  Commonly known as: PROTONIX  TAKE 1 TABLET EVERY DAY     polyethylene glycol 17 gram Pwpk  Commonly known as: GLYCOLAX  Take 17 g by mouth once daily.     simvastatin 40 MG tablet  Commonly known as: ZOCOR  Take 40 mg by mouth every evening. Take one tablet by mouth every at bedtime     TRUEPLUS LANCETS 33 gauge Misc  Generic drug: lancets     TRUETEST TEST STRIPS Strp  Generic drug: blood sugar diagnostic            STOP taking these medications      BEVESPI AEROSPHERE 9-4.8 mcg Hfaa  Generic drug: glycopyrrolate-formoteroL     glipiZIDE 5 MG Tr24     mirabegron 50 mg Tb24  Commonly known as: MYRBETRIQ              Indwelling Lines/Drains at time of discharge:   Lines/Drains/Airways       Airway  Duration                  Airway - Non-Surgical Other (Comment) -- days                    SDOH Screening:  The patient was screened for food insecurity, housing instability, transportation needs, utility difficulties, and interpersonal safety. The social determinant(s) of health identified as a concern this admission  are:  Interpersonal Safety    Concerns were discussed with case management and/or community health workers.                 Time spent on the discharge of patient: 35 minutes         Sharri Dang MD  Department of Hospital Medicine  [facility] - Internal Medicine Telemetry

## 2025-06-24 NOTE — ASSESSMENT & PLAN NOTE
Creatine stable for now. BMP reviewed- noted CrCl cannot be calculated (Patient's most recent lab result is older than the maximum 7 days allowed.). according to latest data. Based on current GFR, CKD stage is stage 4 - GFR 15-29.  Monitor UOP and serial BMP and adjust therapy as needed. Renally dose meds. Avoid nephrotoxic medications and procedures.    Follows with Dr. Barillas. Pending upcoming kidney bx with Dr. Gottlieb tomorrow 6/17.     -- interventional nephrology consulted  **Due significant anemia, patient's biopsy was cancelled. We were unable to transfusion the PRBC prior to time available for her to receive the biopsy. There was no other location available for her to get the procedure within our building. It was decided that patient will get her biopsy done as an outpatient in two weeks.

## 2025-06-25 ENCOUNTER — PATIENT MESSAGE (OUTPATIENT)
Dept: NEPHROLOGY | Facility: CLINIC | Age: 70
End: 2025-06-25
Payer: MEDICARE

## 2025-06-25 DIAGNOSIS — I10 HYPERTENSION, UNSPECIFIED TYPE: Primary | ICD-10-CM

## 2025-06-25 RX ORDER — AMLODIPINE BESYLATE 10 MG/1
10 TABLET ORAL DAILY
Qty: 90 TABLET | Refills: 3 | Status: SHIPPED | OUTPATIENT
Start: 2025-06-25 | End: 2026-06-25

## 2025-06-26 ENCOUNTER — PATIENT MESSAGE (OUTPATIENT)
Dept: NEPHROLOGY | Facility: CLINIC | Age: 70
End: 2025-06-26
Payer: MEDICARE

## 2025-06-26 ENCOUNTER — DOCUMENTATION ONLY (OUTPATIENT)
Dept: NEPHROLOGY | Facility: CLINIC | Age: 70
End: 2025-06-26
Payer: MEDICARE

## 2025-06-26 DIAGNOSIS — I10 HYPERTENSION, UNSPECIFIED TYPE: Primary | ICD-10-CM

## 2025-06-26 RX ORDER — ISOSORBIDE MONONITRATE 30 MG/1
30 TABLET, EXTENDED RELEASE ORAL DAILY
Qty: 30 TABLET | Refills: 11 | Status: SHIPPED | OUTPATIENT
Start: 2025-06-26 | End: 2026-06-26

## 2025-06-26 NOTE — PROGRESS NOTES
"Got a message from her daughter Nisreen. Dr. Gottlieb sent in imdur 30 mg daily after I increased the amlodipine to 10 mg daily. She states that she feel very confused and would like to have only one physician manage the blood pressure meds which I agree. Discussed with Dr. Gottlieb who wants me to be the only person manage her condition. The threshold of getting a biopsy is home BP < 140/90 mmHg 80% of the readings.    Spoke with the patient's daughter to get BP BID for the next two weeks. Will increase amlodipine to 10 mg daily for now (new script sent). Continue metoprolol 100 mg, losartan 100 mg daily. Will add imdur 30 mg daily if still unable to reach goal with increasing amlodipine.    The daughter expressed a lot of stress with titrating the BP meds and keep saying "it's too much for my mom". I talked to her about the importance of controlling BP for decreasing the stroke and the CV risk. However, if they think titrating the blood pressure meds is too much and would like to keep the BP goal at the loosen side and forgo the biopsy, that's an option too.  "

## 2025-07-07 ENCOUNTER — PATIENT MESSAGE (OUTPATIENT)
Dept: NEPHROLOGY | Facility: CLINIC | Age: 70
End: 2025-07-07
Payer: MEDICARE

## 2025-07-07 DIAGNOSIS — I10 HYPERTENSION, UNSPECIFIED TYPE: Primary | ICD-10-CM

## 2025-07-07 RX ORDER — AMLODIPINE BESYLATE 5 MG/1
5 TABLET ORAL DAILY
Qty: 90 TABLET | Refills: 3 | Status: SHIPPED | OUTPATIENT
Start: 2025-07-07 | End: 2026-07-07

## 2025-07-07 NOTE — PROGRESS NOTES
The daughter called and states the patient has a little legs swollen after increased the amlodipine dose to 10 mg. Told her to decrease amlodipine back to 5 mg and will add imdur 30 mg daily (ordered by Dr. Gottlieb on 6/26, she already has the meds).    Instruct her to check blood pressure two times a day, and reiterate that it does not need to be done by home health. Once we get the home blood pressure readings < 140/90 mmHg 80% of the time, will proceed with biopsy.     The patient's daughter requested a new appt and the appt made with Dr. Vega on 7/22.

## 2025-07-07 NOTE — PROGRESS NOTES
"     Cardiology Clinic Note   IGOR: 07/08/2025     Chief complaint: LE swelling     HPI:     Soila Hairston is a 70 y.o. female with a pertinent PMH of:    Coronary artery disease (coronary artery calcifications noted on CT)  Hypertension  Type 2 DM on insulin  Hx of CVA with right sided hemiparesis, on antiplatelet therapy with plavix  CKD Stage 5  MGUS - kappa light chain, bone marrow bx 2/2025 with 7-10% involvement by plasma cell   Anemia of chronic disease and iron deficiency   COPD  Current smoker     She presents today with concern for LE edema. Last seen in March 2025. Today she is accompanied by her daughter who provides some of the history.   She has chronic right leg swelling since her stroke, but noticed an increase in swelling and left leg swelling over the past few months. She has tried elevation of legs and compression stockings with no improvement in her swelling.   Her BP medications have also changed due to uncontrolled hypertension. Her amlodipine was recently decreased from 10 mg to 5 mg due to swelling, and she was started on imdur 20 mg daily. This is a recent change - has not started   She reports being told to stop furosemide due to concerns this would "harm her kidneys."  She reports wheezing and exertional dyspnea. Wheezing is worse in a supine position.   No chest pain, palpitations, syncope or PND.    Mild 2 pillow orthopnea.   BP fluctuates - lowest /70's.       Complete medical history reviewed in Epic  Past surgical history: reviewed in [facility]   Family history: reviewed in [facility]   Social History:  reports that she has been smoking cigarettes. She has a 10 pack-year smoking history. She has never used smokeless tobacco. She reports that she does not drink alcohol and does not use drugs.     Medications:     Current Outpatient Medications   Medication Sig    allopurinoL (ZYLOPRIM) 100 MG tablet Take 100 mg by mouth once daily. Take 1 tablet by mouth daily    alprazolam (XANAX) " "2 MG Tab Take 1 tablet (2 mg total) by mouth 2 (two) times daily as needed. Take 1/2 tablet by mouth two times daily as needed.    amLODIPine (NORVASC) 5 MG tablet Take 1 tablet (5 mg total) by mouth once daily.    BD INSULIN PEN NEEDLE UF MINI 31 gauge x 3/16" Ndle     clopidogreL (PLAVIX) 75 mg tablet Take 1 tablet (75 mg total) by mouth once daily.    empagliflozin (JARDIANCE) 25 mg tablet Take 25 mg by mouth once daily. Take one tablet every morning    ergocalciferol (ERGOCALCIFEROL) 50,000 unit Cap Take 50,000 Units by mouth every 7 days.    fluticasone-umeclidin-vilanter (TRELEGY ELLIPTA) 100-62.5-25 mcg DsDv Inhale 1 puff into the lungs once daily.    isosorbide mononitrate (IMDUR) 30 MG 24 hr tablet Take 1 tablet (30 mg total) by mouth once daily.    LANTUS SOLOSTAR U-100 INSULIN 100 unit/mL (3 mL) InPn pen Inject 16 Units into the skin every evening.    levocetirizine (XYZAL) 5 MG tablet Take 5 mg by mouth every evening.    losartan (COZAAR) 100 MG tablet TAKE 1 TABLET (100 MG TOTAL) BY MOUTH ONCE DAILY.    metoprolol succinate (TOPROL-XL) 100 MG 24 hr tablet TAKE 1 TABLET (100 MG TOTAL) BY MOUTH ONCE DAILY.    naloxone (NARCAN) 4 mg/actuation Spry 1 spray by Nasal route as needed.    oxyCODONE-acetaminophen (PERCOCET)  mg per tablet Take 1 tablet by mouth every 4 (four) hours as needed for Pain.    pantoprazole (PROTONIX) 40 MG tablet TAKE 1 TABLET EVERY DAY    polyethylene glycol (GLYCOLAX) 17 gram PwPk Take 17 g by mouth once daily.    simvastatin (ZOCOR) 40 MG tablet Take 40 mg by mouth every evening. Take one tablet by mouth every at bedtime    TRUEPLUS LANCETS 33 gauge Misc     TRUETEST TEST STRIPS Strp     vitamin B complex (B COMPLEX 1 ORAL) Take 1 tablet by mouth once daily.    furosemide (LASIX) 40 MG tablet Take 1 tablet (40 mg total) by mouth once daily.   Last reviewed on 7/8/2025  1:35 PM by Carlos Kruger      Physical Exam:     BP Readings from Last 3 Encounters:   07/08/25 (!) " 158/76   06/30/25 (!) 163/77   06/17/25 (!) 151/76      Pulse Readings from Last 3 Encounters:   07/08/25 73   06/30/25 75   06/17/25 67      Wt Readings from Last 3 Encounters:   07/08/25 1335 82 kg (180 lb 12.4 oz)   05/28/25 1303 88.9 kg (195 lb 15.8 oz)   04/21/25 1307 88.9 kg (195 lb 15.8 oz)       Constitutional: No apparent distress, conversant, in wheelchair  Neck: No jugular venous distension, no carotid bruits  CV: Regular rate and rhythm, no murmurs, JVP not appreciated at 90 degrees   Pulm: faint crackles at lung bases   Extremities: 2+ pitting bilateral lower extremity edema, warm with palpable pulses  Neuro: right sided weakness     Labs:     Blood Tests:  Lab Results   Component Value Date     06/17/2025     01/16/2025    K 3.5 06/17/2025    K 4.1 01/16/2025     (H) 06/17/2025     01/16/2025    CO2 20 (L) 06/17/2025    CO2 22 (L) 01/16/2025    BUN 21 06/17/2025    CREATININE 2.1 (H) 06/17/2025    GLU 67 (L) 06/17/2025     (H) 01/16/2025    HGBA1C 6.9 (H) 06/17/2025    HGBA1C 8.3 (H) 12/03/2024    HGBA1C 7.1 (H) 11/15/2021    MG 1.8 06/17/2025    AST 17 06/17/2025    AST 26 01/16/2025    ALT 8 (L) 06/17/2025    ALT 14 01/16/2025    ALBUMIN 2.8 (L) 06/17/2025    ALBUMIN 3.5 01/16/2025    PROT 6.3 06/17/2025    PROT 8.6 (H) 01/16/2025    BILITOT 0.3 06/17/2025    BILITOT 0.8 01/16/2025    WBC 6.15 06/17/2025    HGB 8.2 (L) 06/17/2025    HGB 7.3 (L) 03/13/2025    HCT 25.6 (L) 06/17/2025    HCT 22.2 (L) 03/13/2025    MCV 89 06/17/2025    MCV 91 03/13/2025     (L) 06/17/2025     03/13/2025    INR 1.0 06/17/2025    INR 1.2 01/03/2025    INR 1.1 06/04/2022    TSH 4.047 (H) 06/20/2024       Lab Results   Component Value Date    CHOL 134 06/17/2025    CHOL 169 03/15/2022    HDL 50 06/17/2025    TRIG 71 06/17/2025    TRIG 99 03/15/2022       Lab Results   Component Value Date    LDLCALC 69.8 06/17/2025       Urine Tests:  Lab Results   Component Value Date    COLORU  Yellow 12/06/2024    COLORU Yellow 06/20/2024    APPEARANCEUA Hazy (A) 06/20/2024    PHUR 6.0 06/20/2024    SPECGRAV 1.025 06/20/2024    PROTEINUA 3+ (A) 06/20/2024    GLUCUA 200 mg/dL (A) 12/06/2024    GLUCUA Negative 06/20/2024    KETONESU Negative 06/20/2024    BILIRUBINUA Negative 12/06/2024    BILIRUBINUA Negative 06/20/2024    OCCULTUA 0.03 mg/dL 12/06/2024    OCCULTUA Trace (A) 06/20/2024    NITRITE Negative 06/20/2024    UROBILINOGEN Normal 12/06/2024    UROBILINOGEN Negative 06/20/2024    LEUKOCYTESUR 25 /uL (A) 12/06/2024    LEUKOCYTESUR 1+ (A) 06/20/2024    PROTEINURINE 88 (H) 04/25/2025    CREATRANDUR 47.0 04/25/2025    CREATRANDUR 117.0 06/20/2024    UTPCR 1.87 (H) 04/25/2025       Recent Cardiac Testing:     Echocardiogram/Echo Stress Testing  Results for orders placed during the hospital encounter of 09/26/23    Echo    Interpretation Summary    Left Ventricle: The left ventricle is normal in size. Normal wall thickness. Unable to assess wall motion. There is normal systolic function. Ejection fraction by visual approximation is 55%. Elevated left ventricular filling pressure.    Right Ventricle: Normal right ventricular cavity size. Wall thickness is normal. Right ventricle wall motion  is normal. Systolic function is normal.    Mitral Valve: There is mild regurgitation.    Tricuspid Valve: There is mild regurgitation.    Pulmonary Artery: The estimated pulmonary artery systolic pressure is 33 mmHg.    IVC/SVC: Normal venous pressure at 3 mmHg.      Nuclear stress testing  None    Cath Lab  None    Other  None    EKG:   EKG (6/4/2022): normal EKG, normal sinus rhythm, unchanged from previous tracings (personally reviewed)    Assessment:     Edema, volume overload on exam. Possibly related to renal failure; however, there is concern for HFpEF. BNP and TTE ordered. Start furosemide 40 mg daily. Repeat BMP in 1 week.     Hypertension, uncontrolled. Amlodipine decreased to 5 mg daily and isosorbide 30 mg  daily added to regimen yesterday. Advised patient to monitor BP with new changes. [facility] If BP remains elevated, recommend changing metoprolol to coreg.     Coronary artery disease: stable, denies anginal symptoms. On clopidogrel and simvastatin. LDL 70.     Edema, unspecified type  -     Echo Ultrasound enhancing contrast? Yes; Future  -     NT-Pro Natriuretic Peptide; Future; Expected date: 07/08/2025  -     Basic metabolic panel; Future; Expected date: 07/08/2025    Hypertension, uncontrolled  -     Echo Ultrasound enhancing contrast? Yes; Future  -     NT-Pro Natriuretic Peptide; Future; Expected date: 07/08/2025  -     Basic metabolic panel; Future; Expected date: 07/08/2025    Other orders  -     furosemide (LASIX) 40 MG tablet; Take 1 tablet (40 mg total) by mouth once daily.  Dispense: 30 tablet; Refill: 11        Testing to be completed prior to next follow up visit: TTE, labs  Patient appropriate to be seen in [facility] clinic for follow up: No      It was a pleasure to participate in the care of Soila Hairston. She will follow up in 3 months.     Keya English MD    Follow-up:     Future Appointments   Date Time Provider Department Center   7/10/2025  2:00 PM Hiro Walters MD [facility] PULMSVC [place]   7/15/2025  1:00 PM HOSPITAL LAB, [facility] LAB [facility] LAB [facility]   7/15/2025  2:00 PM CHAIR 03 [facility] INFUSIO [facility]   7/22/2025  2:00 PM Mignon Vega MD [facility] NEPHRO [place]   7/22/2025  3:15 PM ECHO, [facility] ECHOSTR [place]   7/30/2025  2:00 PM CHAIR 06 [facility] INFUSIO [facility]   10/14/2025  1:30 PM Keya English MD [facility]

## 2025-07-08 ENCOUNTER — OFFICE VISIT (OUTPATIENT)
Dept: CARDIOLOGY | Facility: CLINIC | Age: 70
End: 2025-07-08
Payer: MEDICARE

## 2025-07-08 VITALS
BODY MASS INDEX: 33.26 KG/M2 | WEIGHT: 180.75 LBS | HEART RATE: 73 BPM | SYSTOLIC BLOOD PRESSURE: 158 MMHG | HEIGHT: 62 IN | DIASTOLIC BLOOD PRESSURE: 76 MMHG | OXYGEN SATURATION: 100 %

## 2025-07-08 DIAGNOSIS — R60.9 EDEMA, UNSPECIFIED TYPE: Primary | ICD-10-CM

## 2025-07-08 DIAGNOSIS — I25.10 CORONARY ARTERY DISEASE INVOLVING NATIVE CORONARY ARTERY OF NATIVE HEART WITHOUT ANGINA PECTORIS: ICD-10-CM

## 2025-07-08 DIAGNOSIS — I10 HYPERTENSION, UNCONTROLLED: ICD-10-CM

## 2025-07-08 PROCEDURE — 99999 PR PBB SHADOW E&M-EST. PATIENT-LVL V: CPT | Mod: PBBFAC,,, | Performed by: INTERNAL MEDICINE

## 2025-07-08 RX ORDER — FUROSEMIDE 40 MG/1
40 TABLET ORAL DAILY
Qty: 30 TABLET | Refills: 11 | Status: SHIPPED | OUTPATIENT
Start: 2025-07-08 | End: 2026-07-08

## 2025-07-08 NOTE — LETTER
July 8, 2025        No Recipients             [facility] - Cardiology - 3rd Fl  [address]  [facility] [address]  Phone: [phone]   Patient: Soila Hairston   MR Number: [id number]   YOB: 1955   Date of Visit: 7/8/2025       Dear Dr. Casas Recipients:    Thank you for referring Soila Hairston to me for evaluation. Below are the relevant portions of my assessment and plan of care.            If you have questions, please do not hesitate to call me. I look forward to following Soila along with you.    Sincerely,      Keya English MD           CC  No Recipients

## 2025-07-08 NOTE — PATIENT INSTRUCTIONS
Start amlodipine 5 mg daily and imdur 30 mg daily   Start furosemide 40 mg daily  Schedule echocardiogram (ultrasound of heart)  Send blood pressure readings through my chart in 2 week - if blood pressure remains elevated, we can adjust your medications   Get labs in 1 week

## 2025-07-08 NOTE — PROGRESS NOTES
Cardiology Clinic Note   IGOR: 07/08/2025     Chief complaint shortness of breath    HPI:     Soila Hairston is a 70 y.o. female, who presents for edema in her right leg and arm.     Pertinent PMH history includes the following:     Coronary artery disease  Hypertension  Type 2 DM on insulin  Hx of CVA with right sided hemiparesis, on antiplatelet therapy with plavix  CKD Stage 5  MGUS  Anemia of chronic disease and iron deficiency   COPD  Current smoker     She was last seen in March 2025. Today she is accompanied by her daughter who provides some of the history.     She has chronic right leg swelling since her stroke, but noticed an increase in swelling and left leg swelling over the past few months. She has tried elevation of legs and compression stockings with no improvement in her swelling.   Her BP medications have also changed due to uncontrolled hypertension. Her amlodipine was increased to 10 mg and then recently decreased to 5 mg and imdur 30 due to concern for swelling. Recent change - has not started   She also advised to stop furosemide 1 year ago.   She reports wheezing and exertional dyspnea. Wheezing is worse in a supine position.     No chest pain, palpitations.   Mild orthopnea.   BP fluctuates - lowest /70's.     Complete medical history reviewed in Epic  Past surgical history: reviewed in [facility]   Family history: reviewed in [facility]   Social History:  reports that she has been smoking cigarettes. She has a 10 pack-year smoking history. She has never used smokeless tobacco. She reports that she does not drink alcohol and does not use drugs.     Medications:     Current Outpatient Medications   Medication Sig    allopurinoL (ZYLOPRIM) 100 MG tablet Take 100 mg by mouth once daily. Take 1 tablet by mouth daily    alprazolam (XANAX) 2 MG Tab Take 1 tablet (2 mg total) by mouth 2 (two) times daily as needed. Take 1/2 tablet by mouth two times daily as needed.    amLODIPine (NORVASC) 5 MG  "tablet Take 1 tablet (5 mg total) by mouth once daily.    BD INSULIN PEN NEEDLE UF MINI 31 gauge x 3/16" Ndle     clopidogreL (PLAVIX) 75 mg tablet Take 1 tablet (75 mg total) by mouth once daily.    empagliflozin (JARDIANCE) 25 mg tablet Take 25 mg by mouth once daily. Take one tablet every morning    ergocalciferol (ERGOCALCIFEROL) 50,000 unit Cap Take 50,000 Units by mouth every 7 days.    fluticasone-umeclidin-vilanter (TRELEGY ELLIPTA) 100-62.5-25 mcg DsDv Inhale 1 puff into the lungs once daily.    isosorbide mononitrate (IMDUR) 30 MG 24 hr tablet Take 1 tablet (30 mg total) by mouth once daily.    LANTUS SOLOSTAR U-100 INSULIN 100 unit/mL (3 mL) InPn pen Inject 16 Units into the skin every evening.    levocetirizine (XYZAL) 5 MG tablet Take 5 mg by mouth every evening.    losartan (COZAAR) 100 MG tablet TAKE 1 TABLET (100 MG TOTAL) BY MOUTH ONCE DAILY.    metoprolol succinate (TOPROL-XL) 100 MG 24 hr tablet TAKE 1 TABLET (100 MG TOTAL) BY MOUTH ONCE DAILY.    naloxone (NARCAN) 4 mg/actuation Spry 1 spray by Nasal route as needed.    oxyCODONE-acetaminophen (PERCOCET)  mg per tablet Take 1 tablet by mouth every 4 (four) hours as needed for Pain.    pantoprazole (PROTONIX) 40 MG tablet TAKE 1 TABLET EVERY DAY    polyethylene glycol (GLYCOLAX) 17 gram PwPk Take 17 g by mouth once daily.    simvastatin (ZOCOR) 40 MG tablet Take 40 mg by mouth every evening. Take one tablet by mouth every at bedtime    TRUEPLUS LANCETS 33 gauge Misc     TRUETEST TEST STRIPS Strp     vitamin B complex (B COMPLEX 1 ORAL) Take 1 tablet by mouth once daily.    furosemide (LASIX) 40 MG tablet Take 1 tablet (40 mg total) by mouth once daily.   Last reviewed on 7/8/2025  1:35 PM by Carlos Kruger      Physical Exam:     BP Readings from Last 3 Encounters:   07/08/25 (!) 158/76   06/30/25 (!) 163/77   06/17/25 (!) 151/76      Pulse Readings from Last 3 Encounters:   07/08/25 73   06/30/25 75   06/17/25 67      Wt Readings from " Last 3 Encounters:   07/08/25 1335 82 kg (180 lb 12.4 oz)   05/28/25 1303 88.9 kg (195 lb 15.8 oz)   04/21/25 1307 88.9 kg (195 lb 15.8 oz)       Constitutional: No apparent distress, conversant  Neck: No jugular venous distension, no carotid bruits  CV: Regular rate and rhythm, no murmurs  Pulm: Clear to auscultation bilaterally  Extremities: No lower extremity edema, warm with palpable pulses  Neuro: non focal    Labs:     Blood Tests:  Lab Results   Component Value Date     06/17/2025     01/16/2025    K 3.5 06/17/2025    K 4.1 01/16/2025     (H) 06/17/2025     01/16/2025    CO2 20 (L) 06/17/2025    CO2 22 (L) 01/16/2025    BUN 21 06/17/2025    CREATININE 2.1 (H) 06/17/2025    GLU 67 (L) 06/17/2025     (H) 01/16/2025    HGBA1C 6.9 (H) 06/17/2025    HGBA1C 8.3 (H) 12/03/2024    HGBA1C 7.1 (H) 11/15/2021    MG 1.8 06/17/2025    AST 17 06/17/2025    AST 26 01/16/2025    ALT 8 (L) 06/17/2025    ALT 14 01/16/2025    ALBUMIN 2.8 (L) 06/17/2025    ALBUMIN 3.5 01/16/2025    PROT 6.3 06/17/2025    PROT 8.6 (H) 01/16/2025    BILITOT 0.3 06/17/2025    BILITOT 0.8 01/16/2025    WBC 6.15 06/17/2025    HGB 8.2 (L) 06/17/2025    HGB 7.3 (L) 03/13/2025    HCT 25.6 (L) 06/17/2025    HCT 22.2 (L) 03/13/2025    MCV 89 06/17/2025    MCV 91 03/13/2025     (L) 06/17/2025     03/13/2025    INR 1.0 06/17/2025    INR 1.2 01/03/2025    INR 1.1 06/04/2022    TSH 4.047 (H) 06/20/2024       Lab Results   Component Value Date    CHOL 134 06/17/2025    CHOL 169 03/15/2022    HDL 50 06/17/2025    TRIG 71 06/17/2025    TRIG 99 03/15/2022       Lab Results   Component Value Date    LDLCALC 69.8 06/17/2025       Urine Tests:  Lab Results   Component Value Date    COLORU Yellow 12/06/2024    COLORU Yellow 06/20/2024    APPEARANCEUA Hazy (A) 06/20/2024    PHUR 6.0 06/20/2024    SPECGRAV 1.025 06/20/2024    PROTEINUA 3+ (A) 06/20/2024    GLUCUA 200 mg/dL (A) 12/06/2024    GLUCUA Negative 06/20/2024     KETONESU Negative 06/20/2024    BILIRUBINUA Negative 12/06/2024    BILIRUBINUA Negative 06/20/2024    OCCULTUA 0.03 mg/dL 12/06/2024    OCCULTUA Trace (A) 06/20/2024    NITRITE Negative 06/20/2024    UROBILINOGEN Normal 12/06/2024    UROBILINOGEN Negative 06/20/2024    LEUKOCYTESUR 25 /uL (A) 12/06/2024    LEUKOCYTESUR 1+ (A) 06/20/2024    PROTEINURINE 88 (H) 04/25/2025    CREATRANDUR 47.0 04/25/2025    CREATRANDUR 117.0 06/20/2024    UTPCR 1.87 (H) 04/25/2025       Recent Cardiac Testing:     Echocardiogram/Echo Stress Testing  Results for orders placed during the hospital encounter of 09/26/23    Echo    Interpretation Summary    Left Ventricle: The left ventricle is normal in size. Normal wall thickness. Unable to assess wall motion. There is normal systolic function. Ejection fraction by visual approximation is 55%. Elevated left ventricular filling pressure.    Right Ventricle: Normal right ventricular cavity size. Wall thickness is normal. Right ventricle wall motion  is normal. Systolic function is normal.    Mitral Valve: There is mild regurgitation.    Tricuspid Valve: There is mild regurgitation.    Pulmonary Artery: The estimated pulmonary artery systolic pressure is 33 mmHg.    IVC/SVC: Normal venous pressure at 3 mmHg.      Nuclear stress testing  None    Cath Lab  None    Other  None    EKG:   EKG (***): {ekg findings:602332}. (personally reviewed)    Assessment:     1. Edema, unspecified type    2. Hypertension, uncontrolled        Plan:     Edema, unspecified type  -     Echo Ultrasound enhancing contrast? Yes; Future  -     NT-Pro Natriuretic Peptide; Future; Expected date: 07/08/2025  -     Basic metabolic panel; Future; Expected date: 07/08/2025    Hypertension, uncontrolled  -     Echo Ultrasound enhancing contrast? Yes; Future  -     NT-Pro Natriuretic Peptide; Future; Expected date: 07/08/2025  -     Basic metabolic panel; Future; Expected date: 07/08/2025    Other orders  -     furosemide  (LASIX) 40 MG tablet; Take 1 tablet (40 mg total) by mouth once daily.  Dispense: 30 tablet; Refill: 11        Let Dr. Barillas know     Testing to be completed prior to next follow up visit: ***  Patient appropriate to be seen in [facility] clinic for follow up: ***    Signed:  Keya English    Follow-up:     Future Appointments   Date Time Provider Department Center   7/10/2025  2:00 PM Hiro Walters MD [facility] PUL[facility]   7/15/2025  1:00 PM HOSPITAL LAB, [facility] LAB [facility]   7/15/2025  2:00 PM CHAIR 03 [facility] INFUSIO [facility]   7/22/2025  2:00 PM Mignon Vega MD [facility] NEPHRO [facility]   7/30/2025  2:00 PM CHAIR 06 [facility]

## 2025-07-09 DIAGNOSIS — R05.9 COUGH, UNSPECIFIED TYPE: Primary | ICD-10-CM

## 2025-07-10 ENCOUNTER — HOSPITAL ENCOUNTER (OUTPATIENT)
Dept: RADIOLOGY | Facility: HOSPITAL | Age: 70
Discharge: HOME | End: 2025-07-10
Attending: INTERNAL MEDICINE
Payer: MEDICARE

## 2025-07-10 ENCOUNTER — OFFICE VISIT (OUTPATIENT)
Dept: PULMONOLOGY | Facility: CLINIC | Age: 70
End: 2025-07-10
Payer: MEDICARE

## 2025-07-10 ENCOUNTER — PATIENT MESSAGE (OUTPATIENT)
Dept: HEMATOLOGY/ONCOLOGY | Facility: CLINIC | Age: 70
End: 2025-07-10
Payer: MEDICARE

## 2025-07-10 VITALS
HEIGHT: 64 IN | HEART RATE: 68 BPM | RESPIRATION RATE: 17 BRPM | SYSTOLIC BLOOD PRESSURE: 158 MMHG | OXYGEN SATURATION: 100 % | BODY MASS INDEX: 31.03 KG/M2 | DIASTOLIC BLOOD PRESSURE: 63 MMHG

## 2025-07-10 DIAGNOSIS — J98.4 RESTRICTIVE LUNG DISEASE DUE TO KYPHOSCOLIOSIS: Chronic | ICD-10-CM

## 2025-07-10 DIAGNOSIS — R05.9 COUGH, UNSPECIFIED TYPE: ICD-10-CM

## 2025-07-10 DIAGNOSIS — M41.9 RESTRICTIVE LUNG DISEASE DUE TO KYPHOSCOLIOSIS: Chronic | ICD-10-CM

## 2025-07-10 DIAGNOSIS — J39.8 TRACHEOBRONCHOMALACIA: Chronic | ICD-10-CM

## 2025-07-10 DIAGNOSIS — G47.33 OBSTRUCTIVE SLEEP APNEA: Primary | ICD-10-CM

## 2025-07-10 PROBLEM — J44.9 COPD (CHRONIC OBSTRUCTIVE PULMONARY DISEASE): Chronic | Status: RESOLVED | Noted: 2025-06-16 | Resolved: 2025-07-10

## 2025-07-10 PROCEDURE — 3008F BODY MASS INDEX DOCD: CPT | Mod: CPTII,S$GLB,, | Performed by: INTERNAL MEDICINE

## 2025-07-10 PROCEDURE — 99204 OFFICE O/P NEW MOD 45 MIN: CPT | Mod: S$GLB,,, | Performed by: INTERNAL MEDICINE

## 2025-07-10 PROCEDURE — 3066F NEPHROPATHY DOC TX: CPT | Mod: CPTII,S$GLB,, | Performed by: INTERNAL MEDICINE

## 2025-07-10 PROCEDURE — 3288F FALL RISK ASSESSMENT DOCD: CPT | Mod: CPTII,S$GLB,, | Performed by: INTERNAL MEDICINE

## 2025-07-10 PROCEDURE — 71046 X-RAY EXAM CHEST 2 VIEWS: CPT | Mod: TC

## 2025-07-10 PROCEDURE — 1125F AMNT PAIN NOTED PAIN PRSNT: CPT | Mod: CPTII,S$GLB,, | Performed by: INTERNAL MEDICINE

## 2025-07-10 PROCEDURE — 1160F RVW MEDS BY RX/DR IN RCRD: CPT | Mod: CPTII,S$GLB,, | Performed by: INTERNAL MEDICINE

## 2025-07-10 PROCEDURE — 3044F HG A1C LEVEL LT 7.0%: CPT | Mod: CPTII,S$GLB,, | Performed by: INTERNAL MEDICINE

## 2025-07-10 PROCEDURE — 1159F MED LIST DOCD IN RCRD: CPT | Mod: CPTII,S$GLB,, | Performed by: INTERNAL MEDICINE

## 2025-07-10 PROCEDURE — 3078F DIAST BP <80 MM HG: CPT | Mod: CPTII,S$GLB,, | Performed by: INTERNAL MEDICINE

## 2025-07-10 PROCEDURE — 99999 PR PBB SHADOW E&M-EST. PATIENT-LVL V: CPT | Mod: PBBFAC,,, | Performed by: INTERNAL MEDICINE

## 2025-07-10 PROCEDURE — 3077F SYST BP >= 140 MM HG: CPT | Mod: CPTII,S$GLB,, | Performed by: INTERNAL MEDICINE

## 2025-07-10 PROCEDURE — 71046 X-RAY EXAM CHEST 2 VIEWS: CPT | Mod: 26,,, | Performed by: STUDENT IN AN ORGANIZED HEALTH CARE EDUCATION/TRAINING PROGRAM

## 2025-07-10 PROCEDURE — 4010F ACE/ARB THERAPY RXD/TAKEN: CPT | Mod: CPTII,S$GLB,, | Performed by: INTERNAL MEDICINE

## 2025-07-10 PROCEDURE — 1100F PTFALLS ASSESS-DOCD GE2>/YR: CPT | Mod: CPTII,S$GLB,, | Performed by: INTERNAL MEDICINE

## 2025-07-10 NOTE — PROGRESS NOTES
Subjective:      Patient ID: Soila Hairston is a 70 y.o. female.    Chief Complaint: Rev test      HPI  70-year-old female with type 2 diabetes, chronic diastolic heart failure, chronic kidney disease, chronic debility who was brought in today by her daughter with complaints of persistent cough and audible wheezing at times.  Patient seems to minimize symptoms somewhat and states that I have always breathe like this.  I do see from the records that she was seen by pulmonologist in 2017 not at this facility and had pulmonary function testing done at that time showing moderate restriction.  I have also reviewed a CT PET from August of last year.  At some point she was put on Trelegy and told she had COPD.  She also has significant back and neck problems in his add surgery previously for this.  Patient and daughter both state that she has had significantly increasing edema over the last 6 months and was recently started on Lasix chest radiograph done today for review.  Patient states that she is mostly sedentary in the chair of the bed.  She is a long-time smoker and continues to smoke about a quarter to half a pack a day    Problem List[1]    Past Surgical History:   Procedure Laterality Date    ABSCESS DRAINAGE Right     breast    COLONOSCOPY N/A 10/18/2019    Procedure: COLONOSCOPY2 day [facility];  Surgeon: Adryan Schultz MD;  Location: [facility];  Service: General;  Laterality: N/A;    EPIDURAL STEROID INJECTION      ESOPHAGOGASTRODUODENOSCOPY N/A 10/18/2019    Procedure: EGD (ESOPHAGOGASTRODUODENOSCOPY);  Surgeon: Adryan Schultz MD;  Location: [facility] ENDO;  Service: General;  Laterality: N/A;    TRANSFORAMINAL EPIDURAL INJECTION OF STEROID Left 2/20/2019    Procedure: INJECTION, STEROID, EPIDURAL, TRANSFORAMINAL APPROACH [0659];  Surgeon: Ad Patel III, MD;  [facility];  Service: Pain Management;  Laterality: Left;  L5/S1     Social History[2]    Family History   Problem Relation  "Name Age of Onset    Dementia Mother      Breast cancer Mother           Review of Systems as per HPI otherwise negative    Objective:     Physical Exam   Constitutional: She is oriented to person, place, and time. She appears well-developed. No distress. She is obese.   HENT:   Head: Normocephalic.   Neck:   Significant kyphoscoliosis   Cardiovascular: Normal rate and regular rhythm.   Pulmonary/Chest: She has decreased breath sounds. She has no wheezes. She has rales.   Neurological: She is alert and oriented to person, place, and time.   Psychiatric: She has a normal mood and affect.   Nursing note and vitals reviewed.            7/10/2025     1:25 PM 7/8/2025     1:35 PM 6/30/2025     1:01 PM 6/17/2025     1:08 PM 6/17/2025    12:45 PM 6/17/2025    11:33 AM 6/17/2025    10:15 AM   Pulmonary Function Tests   SpO2 100 % 100 % 98 % 99 % 99 % 99 % 98 %   Height 5' 4" (1.626 m) 5' 2" (1.575 m)        Weight  82 kg (180 lb 12.4 oz)        BMI (Calculated)  33.1             Assessment:     1. Obstructive sleep apnea    2. Restrictive lung disease due to kyphoscoliosis    3. Tracheobronchomalacia        Orders Placed This Encounter   Procedures    Home Sleep Study     Standing Status:   Future     Expected Date:   7/24/2025     Expiration Date:   7/10/2026     I personally reviewed previously obtained pulmonary function testing as described in the history of present illness    I personally reviewed CT chest images obtained from CT PET done in August of 2024.  My interpretation is:    Significant kyphoscoliosis with a horizontal positioning of upper thoracic and cervical trachea  Tracheobronchomalacia demonstrated at the level of the bifurcation  Chronic basilar interstitial changes and atelectasis    Plan:     No evidence for COPD/obstruction on prior pulmonary function testing  Inhaled bronchodilators not likely to be of much benefit  Increasing edema likely signifies worsening heart failure hand or kidney disease, " agree with diuresis  The audible wheezing is due to upper airway phenomenon due to her significant kyphoscoliosis and tracheal positioning  Noninvasive positive pressure respiratory assist device such as CPAP or BiPAP may be beneficial  We will get a sleep study to determine if this would be a viable option  Smoking cessation discussed  I discussed the above problems and underlying pathology with the patient and daughter in layman's terms that they can understand in great detail and answered the questions to their satisfaction  I will notify her of sleep study results once available  I will see her back in 3 months for follow up, sooner if necessary          [1]   Patient Active Problem List  Diagnosis    Right hemiparesis    History of stroke    Pedal edema    Essential hypertension    Depression    Wound eschar of foot    Type 2 diabetes mellitus with diabetic nephropathy, unspecified whether long term insulin use    Urge incontinence of urine    HLD (hyperlipidemia)    Sacroiliac joint pain    Facet arthropathy, lumbar    Lumbosacral radiculopathy    Tobacco abuse    Cough    Difficulty walking    Balance problems    Closed fracture of ramus of right pubis    Other fracture of sacrum, initial encounter for closed fracture    DDD (degenerative disc disease), lumbar    Lumbar radiculopathy    Iron deficiency anemia    Colitis    Anemia of chronic disease    MGUS (monoclonal gammopathy of unknown significance)    Chronic anemia    Plasma cell neoplasm    Class 1 obesity due to excess calories without serious comorbidity in adult    Aortic atherosclerosis    Coronary artery disease involving native coronary artery of native heart without angina pectoris    Stage 5 chronic kidney disease not on chronic dialysis    Severe obesity (BMI 35.0-39.9) with comorbidity    Stage 4 chronic kidney disease    COPD (chronic obstructive pulmonary disease)   [2]   Social History  Tobacco Use    Smoking status: Some Days     Current  packs/day: 0.25     Average packs/day: 0.3 packs/day for 40.0 years (10.0 ttl pk-yrs)     Types: Cigarettes    Smokeless tobacco: Never    Tobacco comments:     smoke 5 cigs a day   Substance Use Topics    Alcohol use: No     Alcohol/week: 0.0 standard drinks of alcohol    Drug use: No

## 2025-07-11 ENCOUNTER — TELEPHONE (OUTPATIENT)
Dept: SLEEP MEDICINE | Facility: CLINIC | Age: 70
End: 2025-07-11
Payer: MEDICARE

## 2025-07-11 NOTE — TELEPHONE ENCOUNTER
----- Message from Sandra Roca sent at 7/10/2025  2:26 PM CDT -----  [facility]

## 2025-07-14 DIAGNOSIS — D63.8 ANEMIA OF CHRONIC DISEASE: Primary | ICD-10-CM

## 2025-07-14 NOTE — PROGRESS NOTES
The patient location is: [place]  The chief complaint leading to consultation is: Anemia    Visit type: audiovisual    Face to Face time with patient: 15 minutes  40 minutes of total time spent on the encounter, which includes face to face time and non-face to face time preparing to see the patient (eg, review of tests), Obtaining and/or reviewing separately obtained history, Documenting clinical information in the electronic or other health record, Independently interpreting results (not separately reported) and communicating results to the patient/family/caregiver, or Care coordination (not separately reported).     Each patient to whom he or she provides medical services by telemedicine is:  (1) informed of the relationship between the physician and patient and the respective role of any other health care provider with respect to management of the patient; and (2) notified that he or she may decline to receive medical services by telemedicine and may withdraw from such care at any time.    Soila Noguera) is a pleasant 69 y.o.female from [place], [place], here for follow up of her MGUS/Anemia. She has a history of right hemiparesis (stable) secondary to CVA, on anticoagulation and a history of colitis.      Subjective:      Patient ID: Soila Hairston is a 70 y.o. female.    Chief Complaint: Chronic anemia      HPI:  Ms. Hairston is a 70 y.o. 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 Other medical history includes MGUS, CAD, HTN, HLD, DM II, and obesity.      Oncologic History:  Previous patient of Dr. Padgett, for anemia workup. Work up showed IgG kappa Monoclonal band 0.73 g/dl (December 2021).  Spinal fusion May 2022.  Bone Met Survey completed 8/5/2022, no lytic/sclerotic bone lesions; spondylosis  BMBx 8/16/2022 with 3-5% involvement by plasma cell neoplasm and mild erythroid hyperplasia  24hr UPEP/UIFE shows faint monoclonal IgG kappa paraprotein on  "8/16/22 8/5/2024 PET CT negative for lytic lesions and actively noted disease     Interval History 8/13/2024:  Patient and her daughter are both present on the video call today. She continues with stable right sided weakness. She does occasionally have urge associated incontinence and has recently completed antibiotics for a UTI. Patient reports she is doing well and denies fever, chills, drenching night sweats, unexplained weight loss, chest pain, shortness of breath, N/V/D.     Interval History 1/29/25:  Ms. Hairston presents for VV to discuss MGUS lab work. Her daughter is also present on the video call. Since last visit, patient was hospitalized with closed femur fracture due to mechanical fall from 12/2/24 to 12/23/24.While hospitalized, Cr increased from baseline 2.0/2.5 to 4.3. She was discharged to Rehab facility in [place]. While there, Hgb found to be 6.5. She presented to [facility] ED and received 1 unit PRBC. She again presented to [facility] 1/6/25 for Hgb 6.9 requiring and additional unit of PRBC. No reports of overt bleeding at either ED visit.     She has not had Procrit since November due to admission. Has not followed with Nephrology for the same reason. Daughter states she has contacted her Nephrologist and is awaiting an appt.      Return Visit:  2/17/25  Return VV to discuss bm bx results. BM bx performed due to increasing FLC ratio and increased level of paraprotein. She has resumed Procrit injections. Has not seen Dr Barillas but states Dr. Barillas is recommending HD which the patient does not desire this at this time.     Return Visit:  7/15/2025    Renal biopsy scheduled on 6/17 to evaluated for MGRS postponed due to high blood pressure. She was then admitted to [facility] that day for worsening anemia. Hgb at that time 6.7. She was transfused with 1 unit PRBC with Hgb increasing to 8.2 prior to discharge. Hgb today 6.7 despite receiving Aransesp every two weeks. She states she feel "ok" today. " No CP, SOB, lightheadedness, or fatigue. She denies any hematuria, hematochezia, melena, or hemoptysis.     Her current Nephrology provider is leaving [facility] and she is establishing with a new Nephrology provider on July 22nd.       I have reviewed all of the patient's relevant lab work available in the medical record and have utilized this in my evaluation and management recommendations today.    Social History[1]    Family History   Problem Relation Name Age of Onset    Dementia Mother      Breast cancer Mother         Past Surgical History:   Procedure Laterality Date    ABSCESS DRAINAGE Right     breast    COLONOSCOPY N/A 10/18/2019    Procedure: COLONOSCOPY2 day [facility];  Surgeon: Adryan Schultz MD;  Location: [facility];  Service: General;  Laterality: N/A;    EPIDURAL STEROID INJECTION      ESOPHAGOGASTRODUODENOSCOPY N/A 10/18/2019    Procedure: EGD (ESOPHAGOGASTRODUODENOSCOPY);  Surgeon: Adryan Schultz MD;  Location: [facility];  Service: General;  Laterality: N/A;    TRANSFORAMINAL EPIDURAL INJECTION OF STEROID Left 2/20/2019    Procedure: INJECTION, STEROID, EPIDURAL, TRANSFORAMINAL APPROACH [3999];  Surgeon: Ad Patel III, MD;  Location: [facility] OR;  Service: Pain Management;  Laterality: Left;  L5/S1       Past Medical History:   Diagnosis Date    Anticoagulant long-term use     Colitis     Colon polyp     Depression     Diverticulosis of colon     Foot drop     a couple weeks ago    Hemorrhoids     Restrictive lung disease due to kyphoscoliosis 07/10/2025    SOB (shortness of breath)     Tracheobronchomalacia 07/10/2025       Review of Systems   Constitutional:  Negative for appetite change, fatigue and unexpected weight change.   HENT:  Negative for mouth sores.    Eyes:  Negative for visual disturbance.   Respiratory:  Negative for cough and shortness of breath.    Cardiovascular:  Negative for chest pain.   Gastrointestinal:  Negative for abdominal pain and diarrhea.  "  Genitourinary:  Positive for frequency.   Musculoskeletal:  Negative for back pain.   Skin:  Negative for rash.   Neurological:  Negative for headaches.   Hematological:  Negative for adenopathy.   Psychiatric/Behavioral:  The patient is nervous/anxious.           Medication List with Changes/Refills   Current Medications    ALLOPURINOL (ZYLOPRIM) 100 MG TABLET    Take 100 mg by mouth once daily. Take 1 tablet by mouth daily    ALPRAZOLAM (XANAX) 2 MG TAB    Take 1 tablet (2 mg total) by mouth 2 (two) times daily as needed. Take 1/2 tablet by mouth two times daily as needed.    AMLODIPINE (NORVASC) 5 MG TABLET    Take 1 tablet (5 mg total) by mouth once daily.    BD INSULIN PEN NEEDLE UF MINI 31 GAUGE X 3/16" NDLE        CLOPIDOGREL (PLAVIX) 75 MG TABLET    Take 1 tablet (75 mg total) by mouth once daily.    EMPAGLIFLOZIN (JARDIANCE) 25 MG TABLET    Take 25 mg by mouth once daily. Take one tablet every morning    ERGOCALCIFEROL (ERGOCALCIFEROL) 50,000 UNIT CAP    Take 50,000 Units by mouth every 7 days.    FLUTICASONE-UMECLIDIN-VILANTER (TRELEGY ELLIPTA) 100-62.5-25 MCG DSDV    Inhale 1 puff into the lungs once daily.    FUROSEMIDE (LASIX) 40 MG TABLET    Take 1 tablet (40 mg total) by mouth once daily.    ISOSORBIDE MONONITRATE (IMDUR) 30 MG 24 HR TABLET    Take 1 tablet (30 mg total) by mouth once daily.    LANTUS SOLOSTAR U-100 INSULIN 100 UNIT/ML (3 ML) INPN PEN    Inject 16 Units into the skin every evening.    LEVOCETIRIZINE (XYZAL) 5 MG TABLET    Take 5 mg by mouth every evening.    LOSARTAN (COZAAR) 100 MG TABLET    TAKE 1 TABLET (100 MG TOTAL) BY MOUTH ONCE DAILY.    METOPROLOL SUCCINATE (TOPROL-XL) 100 MG 24 HR TABLET    TAKE 1 TABLET (100 MG TOTAL) BY MOUTH ONCE DAILY.    NALOXONE (NARCAN) 4 MG/ACTUATION SPRY    1 spray by Nasal route as needed.    OXYCODONE-ACETAMINOPHEN (PERCOCET)  MG PER TABLET    Take 1 tablet by mouth every 4 (four) hours as needed for Pain.    PANTOPRAZOLE (PROTONIX) 40 MG " TABLET    TAKE 1 TABLET EVERY DAY    POLYETHYLENE GLYCOL (GLYCOLAX) 17 GRAM PWPK    Take 17 g by mouth once daily.    SIMVASTATIN (ZOCOR) 40 MG TABLET    Take 40 mg by mouth every evening. Take one tablet by mouth every at bedtime    TRUEPLUS LANCETS 33 GAUGE MISC        TRUETEST TEST STRIPS STRP        VITAMIN B COMPLEX (B COMPLEX 1 ORAL)    Take 1 tablet by mouth once daily.        Objective:   There were no vitals filed for this visit.    Physical Exam  Constitutional:       Appearance: Normal appearance. She is obese.   HENT:      Head: Normocephalic.      Right Ear: External ear normal.      Left Ear: External ear normal.      Nose: Nose normal.   Pulmonary:      Effort: Pulmonary effort is normal.   Musculoskeletal:      Cervical back: Normal range of motion.   Neurological:      Mental Status: She is alert and oriented to person, place, and time.   Psychiatric:         Mood and Affect: Mood normal.         Behavior: Behavior normal.         Thought Content: Thought content normal.         Judgment: Judgment normal.       Assessment:     Problem List Items Addressed This Visit          Renal/    Chronic kidney disease (CKD), stage IV (severe)       Oncology    MGUS (monoclonal gammopathy of unknown significance)    Chronic anemia - Primary       Lab Results   Component Value Date    WBC 5.73 07/15/2025    RBC 2.22 (L) 07/15/2025    HGB 6.7 (L) 07/15/2025    HCT 20.3 (L) 07/15/2025    MCV 91 07/15/2025    MCH 30.2 07/15/2025    MCHC 33.0 07/15/2025    RDW 16.4 (H) 07/15/2025     (L) 07/15/2025    MPV 10.3 07/15/2025    GRAN 6.6 03/13/2025    GRAN 65.4 03/13/2025    LYMPH 32.3 07/15/2025    LYMPH 1.85 07/15/2025    MONO 5.9 07/15/2025    MONO 0.34 07/15/2025    EOS 1.2 07/15/2025    EOS 0.07 07/15/2025    BASO 0.02 03/13/2025    EOSINOPHIL 1.1 03/13/2025    BASOPHIL 0.2 07/15/2025    BASOPHIL 0.01 07/15/2025      Lab Results   Component Value Date     07/15/2025    K 3.2 (L) 07/15/2025      (H) 07/15/2025    CO2 21 (L) 07/15/2025    BUN 23 07/15/2025    CREATININE 2.3 (H) 07/15/2025    CALCIUM 7.9 (L) 07/15/2025    ANIONGAP 7 (L) 07/15/2025    ESTGFRAFRICA 48.7 (A) 07/20/2022    EGFRNONAA 42.3 (A) 07/20/2022     Lab Results   Component Value Date    ALT 7 (L) 07/15/2025    AST 14 07/15/2025    ALKPHOS 137 07/15/2025    BILITOT 0.6 07/15/2025     Lab Results   Component Value Date    IRON 56 01/16/2025    TRANSFERRIN 149 (L) 01/16/2025    TIBC 221 (L) 01/16/2025    FESATURATED 25 01/16/2025    FERRITIN 1,763 (H) 01/16/2025        Plan:   Anemia due to stage 4 chronic kidney disease  Chronic normocytic, hypochromic anemia; long-standing, HGB in 2013 was 10.8 g/dL  Excluded B12/Folate  Iron studies pending at this time  Hgb 6.7 despite compliance with Aranesp 20,000 units every 2 weeks  Will plan to transfuse 1 unit PRBC on July 22 prior to MD visits  Will increase Araransep dose to 40,000 every 2 weeks  Will check CBC weekly with type and screen and transfuse for Hgb < 7.0    Chronic kidney disease (CKD), stage IV (severe)  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 VV   Provider visit type Benign hem   Infusion scheduling note   1 unit prbc 7/22/25. Needs type and screen that day   Injection scheduling note Aranesp every 2 weeks   Labs CBC, ferritin, iron and TIBC, CMP and type and screen   Scheduling:  Preferred lab:  Lab interval: once a week  CBC and type and screen weekly, can be done same day as Aranesp injections   Imaging   NA   Pharmacy appointment No pharmacy appointment needed      Other referrals No nutrition appointment needed -  No referral to Oncology Primary Care needed -  No massage appointment needed    No additional referrals needed            Collaborating  Provider:  Dr. Csasandra Dan MD    Thank You,  Geovanna Ortega, FNP-C  Benign Hematology                             [1]  Social History  Socioeconomic History    Marital status:    Tobacco Use    Smoking status: Some Days     Current packs/day: 0.25     Average packs/day: 0.3 packs/day for 40.0 years (10.0 ttl pk-yrs)     Types: Cigarettes    Smokeless tobacco: Never    Tobacco comments:     smoke 5 cigs a day   Substance and Sexual Activity    Alcohol use: No     Alcohol/week: 0.0 standard drinks of alcohol    Drug use: No    Sexual activity: Not Currently     Social Drivers of Health     Financial Resource Strain: Low Risk  (6/17/2025)    Overall Financial Resource Strain (CARDIA)     Difficulty of Paying Living Expenses: Not very hard   Food Insecurity: No Food Insecurity (6/17/2025)    Hunger Vital Sign     Worried About Running Out of Food in the Last Year: Never true     Ran Out of Food in the Last Year: Never true   Transportation Needs: No Transportation Needs (6/17/2025)    PRAPARE - Transportation     Lack of Transportation (Medical): No     Lack of Transportation (Non-Medical): No   Physical Activity: Inactive (6/17/2025)    Exercise Vital Sign     Days of Exercise per Week: 0 days     Minutes of Exercise per Session: 0 min   Stress: No Stress Concern Present (3/14/2025)    [employer] - Occupational Stress Questionnaire     Feeling of Stress : Only a little   Housing Stability: Low Risk  (6/17/2025)    Housing Stability Vital Sign     Unable to Pay for Housing in the Last Year: No     Number of Times Moved in the Last Year: 0     Homeless in the Last Year: No

## 2025-07-15 ENCOUNTER — OFFICE VISIT (OUTPATIENT)
Dept: HEMATOLOGY/ONCOLOGY | Facility: CLINIC | Age: 70
End: 2025-07-15
Payer: MEDICARE

## 2025-07-15 VITALS — HEIGHT: 64 IN | WEIGHT: 187 LBS | BODY MASS INDEX: 31.92 KG/M2

## 2025-07-15 DIAGNOSIS — N18.4 ANEMIA DUE TO STAGE 4 CHRONIC KIDNEY DISEASE: Primary | ICD-10-CM

## 2025-07-15 DIAGNOSIS — D63.1 ANEMIA DUE TO STAGE 4 CHRONIC KIDNEY DISEASE: Primary | ICD-10-CM

## 2025-07-15 DIAGNOSIS — D47.2 MGUS (MONOCLONAL GAMMOPATHY OF UNKNOWN SIGNIFICANCE): ICD-10-CM

## 2025-07-15 DIAGNOSIS — N18.4 CHRONIC KIDNEY DISEASE (CKD), STAGE IV (SEVERE): ICD-10-CM

## 2025-07-15 DIAGNOSIS — D63.8 ANEMIA OF CHRONIC DISEASE: ICD-10-CM

## 2025-07-15 PROCEDURE — G2211 COMPLEX E/M VISIT ADD ON: HCPCS | Mod: 95,,,

## 2025-07-15 PROCEDURE — 3066F NEPHROPATHY DOC TX: CPT | Mod: CPTII,95,,

## 2025-07-15 PROCEDURE — 1125F AMNT PAIN NOTED PAIN PRSNT: CPT | Mod: CPTII,95,,

## 2025-07-15 PROCEDURE — 4010F ACE/ARB THERAPY RXD/TAKEN: CPT | Mod: CPTII,95,,

## 2025-07-15 PROCEDURE — 98007 SYNCH AUDIO-VIDEO EST HI 40: CPT | Mod: 95,,,

## 2025-07-15 PROCEDURE — 3044F HG A1C LEVEL LT 7.0%: CPT | Mod: CPTII,95,,

## 2025-07-15 PROCEDURE — 1159F MED LIST DOCD IN RCRD: CPT | Mod: CPTII,95,,

## 2025-07-15 RX ORDER — HYDROCODONE BITARTRATE AND ACETAMINOPHEN 500; 5 MG/1; MG/1
TABLET ORAL ONCE
OUTPATIENT
Start: 2025-07-15 | End: 2025-07-15

## 2025-07-15 NOTE — TELEPHONE ENCOUNTER
Spoke to patient's daughter. Patient will need 1 unit prbcs when available. Will reach out to [facility] to try to arrange

## 2025-07-16 DIAGNOSIS — D63.1 ANEMIA DUE TO STAGE 4 CHRONIC KIDNEY DISEASE: Primary | ICD-10-CM

## 2025-07-16 DIAGNOSIS — N18.4 ANEMIA DUE TO STAGE 4 CHRONIC KIDNEY DISEASE: Primary | ICD-10-CM

## 2025-07-18 ENCOUNTER — TELEPHONE (OUTPATIENT)
Dept: CARDIOLOGY | Facility: CLINIC | Age: 70
End: 2025-07-18
Payer: MEDICARE

## 2025-07-18 ENCOUNTER — HOSPITAL ENCOUNTER (EMERGENCY)
Facility: HOSPITAL | Age: 70
Discharge: HOME | End: 2025-07-19
Attending: STUDENT IN AN ORGANIZED HEALTH CARE EDUCATION/TRAINING PROGRAM
Payer: MEDICARE

## 2025-07-18 ENCOUNTER — TELEPHONE (OUTPATIENT)
Dept: HEMATOLOGY/ONCOLOGY | Facility: CLINIC | Age: 70
End: 2025-07-18
Payer: MEDICARE

## 2025-07-18 DIAGNOSIS — M79.89 LEG SWELLING: ICD-10-CM

## 2025-07-18 DIAGNOSIS — I25.10 CORONARY ARTERY DISEASE INVOLVING NATIVE CORONARY ARTERY OF NATIVE HEART WITHOUT ANGINA PECTORIS: Primary | ICD-10-CM

## 2025-07-18 DIAGNOSIS — R53.83 FATIGUE: ICD-10-CM

## 2025-07-18 DIAGNOSIS — M25.551 RIGHT HIP PAIN: ICD-10-CM

## 2025-07-18 DIAGNOSIS — E87.6 HYPOKALEMIA: Primary | ICD-10-CM

## 2025-07-18 DIAGNOSIS — R79.89 ELEVATED TROPONIN: ICD-10-CM

## 2025-07-18 DIAGNOSIS — N18.31 STAGE 3A CHRONIC KIDNEY DISEASE: ICD-10-CM

## 2025-07-18 DIAGNOSIS — D64.9 ANEMIA, UNSPECIFIED TYPE: ICD-10-CM

## 2025-07-18 DIAGNOSIS — D64.9 SYMPTOMATIC ANEMIA: Primary | ICD-10-CM

## 2025-07-18 LAB
ABSOLUTE EOSINOPHIL (OHS): 0.12 K/UL
ABSOLUTE MONOCYTE (OHS): 0.45 K/UL (ref 0.3–1)
ABSOLUTE NEUTROPHIL COUNT (OHS): 4.39 K/UL (ref 1.8–7.7)
ALBUMIN SERPL BCP-MCNC: 3 G/DL (ref 3.5–5.2)
ALP SERPL-CCNC: 151 UNIT/L (ref 40–150)
ALT SERPL W/O P-5'-P-CCNC: 7 UNIT/L (ref 10–44)
ANION GAP (OHS): 10 MMOL/L (ref 8–16)
APTT PPP: 24 SECONDS (ref 21–32)
AST SERPL-CCNC: 18 UNIT/L (ref 11–45)
BACTERIA #/AREA URNS AUTO: NORMAL /HPF
BASOPHILS # BLD AUTO: 0.02 K/UL
BASOPHILS NFR BLD AUTO: 0.3 %
BILIRUB SERPL-MCNC: 0.7 MG/DL (ref 0.1–1)
BILIRUB UR QL STRIP.AUTO: NEGATIVE
BNP SERPL-MCNC: 289 PG/ML (ref 0–99)
BUN SERPL-MCNC: 20 MG/DL (ref 8–23)
CALCIUM SERPL-MCNC: 8.4 MG/DL (ref 8.7–10.5)
CHLORIDE SERPL-SCNC: 110 MMOL/L (ref 95–110)
CLARITY UR: CLEAR
CO2 SERPL-SCNC: 22 MMOL/L (ref 23–29)
COLOR UR AUTO: YELLOW
CREAT SERPL-MCNC: 2.6 MG/DL (ref 0.5–1.4)
ERYTHROCYTE [DISTWIDTH] IN BLOOD BY AUTOMATED COUNT: 17 % (ref 11.5–14.5)
GFR SERPLBLD CREATININE-BSD FMLA CKD-EPI: 19 ML/MIN/1.73/M2
GLUCOSE SERPL-MCNC: 190 MG/DL (ref 70–110)
GLUCOSE UR QL STRIP: ABNORMAL
HCT VFR BLD AUTO: 23.7 % (ref 37–48.5)
HCV AB SERPL QL IA: NORMAL
HGB BLD-MCNC: 7.4 GM/DL (ref 12–16)
HGB UR QL STRIP: ABNORMAL
HIV 1+2 AB+HIV1 P24 AG SERPL QL IA: NORMAL
HOLD SPECIMEN: NORMAL
HYALINE CASTS UR QL AUTO: 0 /LPF (ref 0–1)
IMM GRANULOCYTES # BLD AUTO: 0.06 K/UL (ref 0–0.04)
IMM GRANULOCYTES NFR BLD AUTO: 0.8 % (ref 0–0.5)
INDIRECT COOMBS: NORMAL
INR PPP: 1 (ref 0.8–1.2)
KETONES UR QL STRIP: NEGATIVE
LEUKOCYTE ESTERASE UR QL STRIP: NEGATIVE
LYMPHOCYTES # BLD AUTO: 2.51 K/UL (ref 1–4.8)
MAGNESIUM SERPL-MCNC: 1.8 MG/DL (ref 1.6–2.6)
MCH RBC QN AUTO: 29.4 PG (ref 27–31)
MCHC RBC AUTO-ENTMCNC: 31.2 G/DL (ref 32–36)
MCV RBC AUTO: 94 FL (ref 82–98)
MICROSCOPIC COMMENT: NORMAL
NITRITE UR QL STRIP: NEGATIVE
NUCLEATED RBC (/100WBC) (OHS): 0 /100 WBC
PH UR STRIP: 6 [PH]
PHOSPHATE SERPL-MCNC: 3.4 MG/DL (ref 2.7–4.5)
PLATELET # BLD AUTO: 169 K/UL (ref 150–450)
PMV BLD AUTO: 11.1 FL (ref 9.2–12.9)
POTASSIUM SERPL-SCNC: 3.5 MMOL/L (ref 3.5–5.1)
PROT SERPL-MCNC: 7.7 GM/DL (ref 6–8.4)
PROT UR QL STRIP: ABNORMAL
PROTHROMBIN TIME: 11.2 SECONDS (ref 9–12.5)
RBC # BLD AUTO: 2.52 M/UL (ref 4–5.4)
RBC #/AREA URNS AUTO: 3 /HPF (ref 0–4)
RELATIVE EOSINOPHIL (OHS): 1.6 %
RELATIVE LYMPHOCYTE (OHS): 33.2 % (ref 18–48)
RELATIVE MONOCYTE (OHS): 6 % (ref 4–15)
RELATIVE NEUTROPHIL (OHS): 58.1 % (ref 38–73)
RH BLD: NORMAL
SODIUM SERPL-SCNC: 142 MMOL/L (ref 136–145)
SP GR UR STRIP: 1.01
SPECIMEN OUTDATE: NORMAL
SQUAMOUS #/AREA URNS AUTO: 1 /HPF
T4 FREE SERPL-MCNC: 0.96 NG/DL (ref 0.71–1.51)
TROPONIN I SERPL HS-MCNC: 369 NG/L
TSH SERPL-ACNC: 4.11 UIU/ML (ref 0.4–4)
UROBILINOGEN UR STRIP-ACNC: NEGATIVE EU/DL
WBC # BLD AUTO: 7.55 K/UL (ref 3.9–12.7)
WBC #/AREA URNS AUTO: 3 /HPF (ref 0–5)
YEAST UR QL AUTO: NORMAL /HPF

## 2025-07-18 PROCEDURE — 83880 ASSAY OF NATRIURETIC PEPTIDE: CPT

## 2025-07-18 PROCEDURE — 82040 ASSAY OF SERUM ALBUMIN: CPT

## 2025-07-18 PROCEDURE — 84100 ASSAY OF PHOSPHORUS: CPT | Performed by: STUDENT IN AN ORGANIZED HEALTH CARE EDUCATION/TRAINING PROGRAM

## 2025-07-18 PROCEDURE — 85025 COMPLETE CBC W/AUTO DIFF WBC: CPT

## 2025-07-18 PROCEDURE — 85610 PROTHROMBIN TIME: CPT

## 2025-07-18 PROCEDURE — 83735 ASSAY OF MAGNESIUM: CPT | Performed by: STUDENT IN AN ORGANIZED HEALTH CARE EDUCATION/TRAINING PROGRAM

## 2025-07-18 PROCEDURE — 84443 ASSAY THYROID STIM HORMONE: CPT | Performed by: STUDENT IN AN ORGANIZED HEALTH CARE EDUCATION/TRAINING PROGRAM

## 2025-07-18 PROCEDURE — 84484 ASSAY OF TROPONIN QUANT: CPT

## 2025-07-18 PROCEDURE — 86900 BLOOD TYPING SEROLOGIC ABO: CPT | Performed by: STUDENT IN AN ORGANIZED HEALTH CARE EDUCATION/TRAINING PROGRAM

## 2025-07-18 PROCEDURE — 86803 HEPATITIS C AB TEST: CPT | Performed by: PHYSICIAN ASSISTANT

## 2025-07-18 PROCEDURE — 99285 EMERGENCY DEPT VISIT HI MDM: CPT | Mod: 25

## 2025-07-18 PROCEDURE — 84439 ASSAY OF FREE THYROXINE: CPT | Performed by: STUDENT IN AN ORGANIZED HEALTH CARE EDUCATION/TRAINING PROGRAM

## 2025-07-18 PROCEDURE — 93005 ELECTROCARDIOGRAM TRACING: CPT

## 2025-07-18 PROCEDURE — 93010 ELECTROCARDIOGRAM REPORT: CPT | Mod: ,,, | Performed by: INTERNAL MEDICINE

## 2025-07-18 PROCEDURE — 81003 URINALYSIS AUTO W/O SCOPE: CPT | Performed by: STUDENT IN AN ORGANIZED HEALTH CARE EDUCATION/TRAINING PROGRAM

## 2025-07-18 PROCEDURE — 87389 HIV-1 AG W/HIV-1&-2 AB AG IA: CPT | Performed by: PHYSICIAN ASSISTANT

## 2025-07-18 PROCEDURE — 85730 THROMBOPLASTIN TIME PARTIAL: CPT

## 2025-07-18 RX ORDER — POTASSIUM CHLORIDE 20 MEQ/1
20 TABLET, EXTENDED RELEASE ORAL DAILY
Qty: 30 TABLET | Refills: 11 | Status: SHIPPED | OUTPATIENT
Start: 2025-07-18 | End: 2026-07-18

## 2025-07-18 NOTE — TELEPHONE ENCOUNTER
"Pt called and spoke to her daughter Nisreen.  Labs results regarding the BNP and K+ level with plans to start K+ supplement and repeat labs in one week.   BMP linked to labs 7/29.  Daughter verbalized understanding. Pt's SOB improved with Lasix.  Daughter reports hand swelling has resolved completely and feet swelling has improved.  Pt wakes with no swelling but increases as the day goes on.  Discussed elevating leg as much as possible.  Daughter plans on getting something to help elevate her mother's legs above the heart.  Reports compliance with low sodium diet.      Pt's daughter reports her mother's BP is not controlled.  She is taking Norvasc 5 mg, Lasix 40 mg, Imdur 30 mg, Cozaar 100 mg, Toprol  mg.  Her BP 7/14 was 170/74 HR 78 at OB-GYN, BP 7/10 158/63, HR 68 at Pulmonary.    Daughter states her mother's biopsy was cancelled 7/17 due to elevated BP.  " Possible MGRS-Renal biopsy needed for diagnosis-Biopsy attempted on June 17th, however unable to proceed due to elevated BP"  Per Hematology  I instructed the pt's daughter to start a BP log.  Daughter agreed.      Pt's daughter would like you to be aware that she saw Pulmonary on 7/10 and pt has been dx with Tracheobronchomalacia. Please see office notes with assessment and plan.  "

## 2025-07-18 NOTE — TELEPHONE ENCOUNTER
----- Message from Keya English MD sent at 7/18/2025  9:35 AM CDT -----  BNP elevated consistent with volume overload, K level mildly reduced and Cr is stable, BG high    Recommend continue furosemide 40 mg daily  Start KCL 20 meq daily, repeat BMP in 1 week  Follow up with PCP about diabetes   Did she have any improvement in her shortness of breath with furosemide?     Thanks, DW  ----- Message -----  From: Lab, Background User  Sent: 7/15/2025   4:59 PM CDT  To: Keya English MD

## 2025-07-18 NOTE — TELEPHONE ENCOUNTER
----- Message from Lior sent at 7/18/2025  9:13 AM CDT -----                        Hi, can someone please reach out to pt daughter, I can't seem to get through to her. I have been to Infusion to talk to the nurse, to Bill and both nurses explain to me the same thing I tried to explain to the pt daughter. She wants to talk to someone on the care team        Thanks                       [phone] Nisreen pt daughter

## 2025-07-18 NOTE — TELEPHONE ENCOUNTER
Copied from CRM #[id number]. Topic: Appointments - Appointment Access  >> Jul 17, 2025  4:23 PM Yumiko wrote:    Appt Date:  07/22     Type of appt:   Labs & Infusion    Physician:   [place]    Reason for rescheduling?  Daughter would like to move Mother's labs and infusion to the afternoon or around 11 am if possible. Pt won't be able to sit for very long between appts. Please call back to assist    Caller:  Nisreen (Pt's Daughter)    Contact Preference:  [phone]

## 2025-07-18 NOTE — TELEPHONE ENCOUNTER
"I called the daughter back.  She wants you to discuss with Dr. Barillas her Nephrologist b/c her Norvasc was increased to 10 mg and then decreased back to 5 mg.      Per Dr. Barillas's note 7/7, "The daughter called and states the patient has a little legs swollen after increased the amlodipine dose to 10 mg. Told her to decrease amlodipine back to 5 mg and will add imdur 30 mg daily (ordered by Dr. Gottlieb on 6/26, she already has the meds).     Instruct her to check blood pressure two times a day, and reiterate that it does not need to be done by home health. Once we get the home blood pressure readings < 140/90 mmHg 80% of the time, will proceed with biopsy.      The patient's daughter requested a new appt and the appt made with Dr. Vega on 7/22."  " 3

## 2025-07-18 NOTE — Clinical Note
Nisreen accompanied their mother to the emergency department on 7/18/2025. They may return to school on 07/20/2025.      If you have any questions or concerns, please don't hesitate to call.      OSBALDO Joiner RN

## 2025-07-19 VITALS
WEIGHT: 187 LBS | DIASTOLIC BLOOD PRESSURE: 91 MMHG | TEMPERATURE: 99 F | SYSTOLIC BLOOD PRESSURE: 202 MMHG | RESPIRATION RATE: 18 BRPM | OXYGEN SATURATION: 100 % | HEIGHT: 64 IN | BODY MASS INDEX: 31.92 KG/M2 | HEART RATE: 83 BPM

## 2025-07-19 LAB
ABO + RH BLD: NORMAL
ABSOLUTE EOSINOPHIL (OHS): 0.08 K/UL
ABSOLUTE MONOCYTE (OHS): 0.46 K/UL (ref 0.3–1)
ABSOLUTE NEUTROPHIL COUNT (OHS): 4.99 K/UL (ref 1.8–7.7)
BASOPHILS # BLD AUTO: 0.02 K/UL
BASOPHILS NFR BLD AUTO: 0.3 %
BLD PROD TYP BPU: NORMAL
BLOOD UNIT EXPIRATION DATE: NORMAL
BLOOD UNIT TYPE CODE: 7300
CROSSMATCH INTERPRETATION: NORMAL
DISPENSE STATUS: NORMAL
ERYTHROCYTE [DISTWIDTH] IN BLOOD BY AUTOMATED COUNT: 15.6 % (ref 11.5–14.5)
HCT VFR BLD AUTO: 29.4 % (ref 37–48.5)
HGB BLD-MCNC: 9.3 GM/DL (ref 12–16)
HOLD SPECIMEN: NORMAL
IMM GRANULOCYTES # BLD AUTO: 0.08 K/UL (ref 0–0.04)
IMM GRANULOCYTES NFR BLD AUTO: 1.1 % (ref 0–0.5)
LYMPHOCYTES # BLD AUTO: 1.57 K/UL (ref 1–4.8)
MCH RBC QN AUTO: 29.2 PG (ref 27–31)
MCHC RBC AUTO-ENTMCNC: 31.6 G/DL (ref 32–36)
MCV RBC AUTO: 92 FL (ref 82–98)
NUCLEATED RBC (/100WBC) (OHS): 0 /100 WBC
OHS QRS DURATION: 84 MS
OHS QTC CALCULATION: 519 MS
PLATELET # BLD AUTO: 159 K/UL (ref 150–450)
PMV BLD AUTO: 11.1 FL (ref 9.2–12.9)
RBC # BLD AUTO: 3.19 M/UL (ref 4–5.4)
RELATIVE EOSINOPHIL (OHS): 1.1 %
RELATIVE LYMPHOCYTE (OHS): 21.8 % (ref 18–48)
RELATIVE MONOCYTE (OHS): 6.4 % (ref 4–15)
RELATIVE NEUTROPHIL (OHS): 69.3 % (ref 38–73)
TROPONIN I SERPL HS-MCNC: 254 NG/L
UNIT NUMBER: NORMAL
WBC # BLD AUTO: 7.2 K/UL (ref 3.9–12.7)

## 2025-07-19 PROCEDURE — 25000003 PHARM REV CODE 250: Performed by: EMERGENCY MEDICINE

## 2025-07-19 PROCEDURE — 84484 ASSAY OF TROPONIN QUANT: CPT | Performed by: STUDENT IN AN ORGANIZED HEALTH CARE EDUCATION/TRAINING PROGRAM

## 2025-07-19 PROCEDURE — 36430 TRANSFUSION BLD/BLD COMPNT: CPT

## 2025-07-19 PROCEDURE — P9016 RBC LEUKOCYTES REDUCED: HCPCS | Performed by: EMERGENCY MEDICINE

## 2025-07-19 PROCEDURE — 85025 COMPLETE CBC W/AUTO DIFF WBC: CPT | Performed by: STUDENT IN AN ORGANIZED HEALTH CARE EDUCATION/TRAINING PROGRAM

## 2025-07-19 PROCEDURE — 86920 COMPATIBILITY TEST SPIN: CPT | Performed by: EMERGENCY MEDICINE

## 2025-07-19 RX ORDER — AMLODIPINE BESYLATE 5 MG/1
5 TABLET ORAL
Status: COMPLETED | OUTPATIENT
Start: 2025-07-19 | End: 2025-07-19

## 2025-07-19 RX ORDER — METOPROLOL SUCCINATE 50 MG/1
100 TABLET, EXTENDED RELEASE ORAL
Status: COMPLETED | OUTPATIENT
Start: 2025-07-19 | End: 2025-07-19

## 2025-07-19 RX ORDER — OXYCODONE AND ACETAMINOPHEN 10; 325 MG/1; MG/1
1 TABLET ORAL
Refills: 0 | Status: COMPLETED | OUTPATIENT
Start: 2025-07-19 | End: 2025-07-19

## 2025-07-19 RX ORDER — HYDROCODONE BITARTRATE AND ACETAMINOPHEN 500; 5 MG/1; MG/1
TABLET ORAL
Status: DISCONTINUED | OUTPATIENT
Start: 2025-07-19 | End: 2025-07-19 | Stop reason: HOSPADM

## 2025-07-19 RX ORDER — LOSARTAN POTASSIUM 50 MG/1
100 TABLET ORAL
Status: COMPLETED | OUTPATIENT
Start: 2025-07-19 | End: 2025-07-19

## 2025-07-19 RX ADMIN — METOPROLOL SUCCINATE 100 MG: 50 TABLET, EXTENDED RELEASE ORAL at 05:07

## 2025-07-19 RX ADMIN — AMLODIPINE BESYLATE 5 MG: 5 TABLET ORAL at 05:07

## 2025-07-19 RX ADMIN — OXYCODONE AND ACETAMINOPHEN 1 TABLET: 325; 10 TABLET ORAL at 08:07

## 2025-07-19 RX ADMIN — LOSARTAN POTASSIUM 100 MG: 50 TABLET, FILM COATED ORAL at 05:07

## 2025-07-19 NOTE — PROVIDER PROGRESS NOTES - EMERGENCY DEPT.
"Signout Note    I received signout from the previous provider.     Chief complaint:  Fatigue (Repots feeling "more tired" recently, reports she recently had low hgb on labs, denies any SOB, CP, dizziness)      Pertinent history &exam:  Soila Hairston is a 70 y.o. female with pertinent PMH of stage V chronic kidney disease not on dialysis, COPD, stroke with right hemiparesis, HTN, type 2 diabetes, HLD, MGUS, CAD  who presented to emergency department with complaint of  fatigue.  No other complaints.  She is afebrile and hemodynamically stable.  She was signed out to me pending re-evaluation for final disposition    Vitals:    07/19/25 0827   BP: (!) 202/91   Pulse: 83   Resp: 18   Temp: 98.6 °F (37 °C)       Imaging Studies:    US Lower Extremity Veins Right   Final Result      No evidence of deep venous thrombosis in the right lower extremity.         Electronically signed by: Minesh Caldwell MD   Date:    07/18/2025   Time:    23:58      X-Ray Femur 2 View Right   Final Result      See findings above.         Electronically signed by: Danish Latif MD   Date:    07/18/2025   Time:    22:01      X-Ray Hip 2 or 3 views Right with Pelvis when performed   Final Result      See findings above.         Electronically signed by: Danish Latif MD   Date:    07/18/2025   Time:    22:01      X-Ray Chest AP Portable   Final Result      1. Interstitial findings are accentuated by habitus, superimposed edema is a consideration.  No large focal consolidation.         Electronically signed by: Chaitanya Kenney MD   Date:    07/18/2025   Time:    20:34          Medications Given:  Medications   amLODIPine tablet 5 mg (5 mg Oral Given 7/19/25 0541)   metoprolol succinate (TOPROL-XL) 24 hr tablet 100 mg (100 mg Oral Given 7/19/25 0541)   losartan tablet 100 mg (100 mg Oral Given 7/19/25 0541)   oxyCODONE-acetaminophen  mg per tablet 1 tablet (1 tablet Oral Given 7/19/25 0828)       Pending Items/ MDM:  70 y.o. female " with  above complaints.    I suspect the patient has symptomatic anemia.  I consented her for transfusion of blood, and ordered 1 unit of PRBCs.  Her repeat troponin is flat.  I offered her observation stay need you to the serially elevated troponins, however patient stated that she did not want to stay in the hospital any under any circumstances.  She is requesting blood transfusion and then would like to be discharged.    She was signed out to oncoming team pending re-evaluation after blood transfusion for anticipated discharge home.    Diagnostic Impression:    1. Symptomatic anemia    2. Fatigue    3. Leg swelling    4. Right hip pain    5. Anemia, unspecified type    6. Elevated troponin         ED Disposition Condition    Discharge Stable          ED Prescriptions    None       Follow-up Information       Follow up With Specialties Details Why Contact Info Additional Information    Jc Lockhart MD Family Medicine Schedule an appointment as soon as possible for a visit   [address] [facility]  [address] [facility] [id number] [phone]       [facility] - Cardiology - [facility] Cardiology Schedule an appointment as soon as possible for a visit   [address] [facility] [address] [phone] Cardiology Services Clinics - 3rd floor            Patient and/or family understands the plan and is in agreement, verbalized understanding, questions answered    Teetee Choi MD  Emergency Medicine

## 2025-07-19 NOTE — ED NOTES
I assumed care of this patient at this time. Report received from KATERINE Rosales. Pt is resting comfortably in ED stretcher + no acute distress observed. Pt is AAOx4. RR is even, unlabored, and spontaneous + pt's oxygen saturation is 100% on room air at this time. Reporting 10/10 back pain. Pt remains on continuous cardiac monitor, pulse ox, and BP cuff to cycle. Bed low and locked; side rails up x2; call light within reach. Family member at bedside.

## 2025-07-19 NOTE — ED NOTES
Assumed care of patient. Pt remains on cardiac monitor, continuous pulse ox, and cycling blood pressures. Bed placed in low locked position, side rails up x2, call bell is within reach. Will continue to monitor.

## 2025-07-19 NOTE — PROVIDER PROGRESS NOTES - EMERGENCY DEPT.
Encounter Date: 7/18/2025    ED Physician Progress Notes          Signed out to me at 7:00 a.m. pending transfusion and plan for discharge after 1 unit PRBC.  In summary patient is a 70-year-old female who presented to the ER for fatigue with symptomatic anemia.  Patient did have elevated troponin but that remained flat and patient was not having any chest pain.  Per sign-out patient was offered admission for observation however she declined and requested discharge after transfusion.  On repeat exam patient feeling much better denies any chest pain shortness of breath dizziness or lightheadedness.  Patient requested her home Percocet which she takes daily for her chronic hip and back pain.  But otherwise patient is feeling much better and requesting discharge.  Repeat H&H improved.  Given patient is feeling much improved and plan was for discharge with close outpatient follow up I feel that is reasonable.  Joint decision making was made with patient and she will follow up closely for further outpatient management.  Strict return precautions given.

## 2025-07-19 NOTE — ED PROVIDER NOTES
"Encounter Date: 7/18/2025       History     Chief Complaint   Patient presents with    Fatigue     Repots feeling "more tired" recently, reports she recently had low hgb on labs, denies any SOB, CP, dizziness     70-year-old female with past medical history of  stage V chronic kidney disease not on dialysis, COPD, stroke with right hemiparesis, HTN, type 2 diabetes, HLD, MGUS, CAD who presents to the emergency department complaining of worsening generalized weakness over the last few days.  She does note chronic right hip and leg pain, she has history of broken right femur. Pain mildly improved with home oxycodone.  Denies any trauma or recent falls.  She denies any fever, chills, nausea, vomiting, chest pain, shortness of breath, abd pain, N/V/D, melena, hematuria, and any other sxs at this time. She's currently taking Macrobid for UTI    Of note, last Hgb 3 days ago was 6.7, it was recommended she get a blood transfusion, however she did not receive one yet.        Review of patient's allergies indicates:  No Known Allergies  Past Medical History:   Diagnosis Date    Anticoagulant long-term use     Colitis     Colon polyp     Depression     Diverticulosis of colon     Foot drop     a couple weeks ago    Hemorrhoids     Restrictive lung disease due to kyphoscoliosis 07/10/2025    SOB (shortness of breath)     Tracheobronchomalacia 07/10/2025     Past Surgical History:   Procedure Laterality Date    ABSCESS DRAINAGE Right     breast    COLONOSCOPY N/A 10/18/2019    Procedure: COLONOSCOPY2 day [facility];  Surgeon: Adryan Schultz MD;  Location: [facility] ENDO;  Service: General;  Laterality: N/A;    EPIDURAL STEROID INJECTION      ESOPHAGOGASTRODUODENOSCOPY N/A 10/18/2019    Procedure: EGD (ESOPHAGOGASTRODUODENOSCOPY);  Surgeon: Adryan Schultz MD;  Location: [facility] ENDO;  Service: General;  Laterality: N/A;    TRANSFORAMINAL EPIDURAL INJECTION OF STEROID Left 2/20/2019    Procedure: INJECTION, STEROID, EPIDURAL, " TRANSFORAMINAL APPROACH [9849];  Surgeon: Ad Patel III, MD;  Location: [facility] OR;  Service: Pain Management;  Laterality: Left;  L5/S1     Family History   Problem Relation Name Age of Onset    Dementia Mother      Breast cancer Mother       Social History[1]  Review of Systems    Physical Exam     Initial Vitals [07/18/25 1916]   BP Pulse Resp Temp SpO2   (!) 173/78 78 18 98.6 °F (37 °C) 98 %      MAP       --         Physical Exam    Nursing note and vitals reviewed.  Constitutional: She appears well-developed and well-nourished. She is not diaphoretic. No distress.   HENT:   Head: Normocephalic and atraumatic. Mouth/Throat: Oropharynx is clear and moist.   Eyes: Conjunctivae and EOM are normal. Pupils are equal, round, and reactive to light.   Neck: Neck supple.   Normal range of motion.  Cardiovascular:  Normal rate, regular rhythm, normal heart sounds and intact distal pulses.           Pulmonary/Chest: Breath sounds normal. No respiratory distress. She has no wheezes. She has no rhonchi. She has no rales. She exhibits no tenderness.   Abdominal: Abdomen is soft. She exhibits no distension. There is no abdominal tenderness.   Musculoskeletal:         General: Normal range of motion.      Cervical back: Normal range of motion and neck supple.     Neurological: She is alert and oriented to person, place, and time.   R hemiparesis   Skin: Skin is warm and dry. Capillary refill takes less than 2 seconds.   Psychiatric: She has a normal mood and affect.         ED Course   Procedures  Labs Reviewed   COMPREHENSIVE METABOLIC PANEL - Abnormal       Result Value    Sodium 142      Potassium 3.5      Chloride 110      CO2 22 (*)     Glucose 190 (*)     BUN 20      Creatinine 2.6 (*)     Calcium 8.4 (*)     Protein Total 7.7      Albumin 3.0 (*)     Bilirubin Total 0.7       (*)     AST 18      ALT 7 (*)     Anion Gap 10      eGFR 19 (*)    TROPONIN I HIGH SENSITIVITY - Abnormal    Troponin High  Sensitive 369 (*)    B-TYPE NATRIURETIC PEPTIDE - Abnormal     (*)    CBC WITH DIFFERENTIAL - Abnormal    WBC 7.55      RBC 2.52 (*)     HGB 7.4 (*)     HCT 23.7 (*)     MCV 94      MCH 29.4      MCHC 31.2 (*)     RDW 17.0 (*)     Platelet Count 169      MPV 11.1      Nucleated RBC 0      Neut % 58.1      Lymph % 33.2      Mono % 6.0      Eos % 1.6      Basophil % 0.3      Imm Grans % 0.8 (*)     Neut # 4.39      Lymph # 2.51      Mono # 0.45      Eos # 0.12      Baso # 0.02      Imm Grans # 0.06 (*)    TSH - Abnormal    TSH 4.114 (*)    URINALYSIS, REFLEX TO URINE CULTURE - Abnormal    Color, UA Yellow      Appearance, UA Clear      pH, UA 6.0      Spec Grav UA 1.010      Protein, UA 1+ (*)     Glucose, UA 4+ (*)     Ketones, UA Negative      Bilirubin, UA Negative      Blood, UA Trace (*)     Nitrites, UA Negative      Urobilinogen, UA Negative      Leukocyte Esterase, UA Negative     HEPATITIS C ANTIBODY - Normal    Hep C Ab Interp Non-Reactive     HIV 1 / 2 ANTIBODY - Normal    HIV 1/2 Ag/Ab Non-Reactive     APTT - Normal    PTT 24.0     PROTIME-INR - Normal    PT 11.2      INR 1.0     MAGNESIUM - Normal    Magnesium  1.8     PHOSPHORUS - Normal    Phosphorus Level 3.4     T4, FREE - Normal    Free T4 0.96     CBC W/ AUTO DIFFERENTIAL    Narrative:     The following orders were created for panel order CBC auto differential.  Procedure                               Abnormality         Status                     ---------                               -----------         ------                     CBC with Differential[[id number]]       Abnormal            Final result                 Please view results for these tests on the individual orders.   EXTRA TUBES    Narrative:     The following orders were created for panel order EXTRA TUBES.  Procedure                               Abnormality         Status                     ---------                               -----------         ------                      Light Green Top Hold[[id number]]                            Final result                 Please view results for these tests on the individual orders.   LIGHT GREEN TOP HOLD    Extra Tube Hold for add-ons.     URINALYSIS MICROSCOPIC    RBC, UA 3      WBC, UA 3      Bacteria, UA Rare      Yeast, UA None      Squamous Epithelial Cells, UA 1      Hyaline Casts, UA 0      Microscopic Comment       HEP C VIRUS HOLD SPECIMEN   GREY TOP URINE HOLD   TROPONIN I HIGH SENSITIVITY   TYPE & SCREEN    Specimen Outdate 07/21/2025 23:59      Group & Rh B POS      Indirect Koki NEG            Imaging Results              US Lower Extremity Veins Right (Final result)  Result time 07/18/25 23:58:45      Final result by Minesh Caldwell MD (07/18/25 23:58:45)                   Impression:      No evidence of deep venous thrombosis in the right lower extremity.      Electronically signed by: Minesh Caldwell MD  Date:    07/18/2025  Time:    23:58               Narrative:    EXAMINATION:  US LOWER EXTREMITY VEINS RIGHT    CLINICAL HISTORY:  Other specified soft tissue disorders    TECHNIQUE:  Duplex and color flow Doppler evaluation and graded compression of the right lower extremity veins was performed.    COMPARISON:  Bilateral lower extremity venous Doppler ultrasound 09/26/2023    FINDINGS:  Right thigh veins: The common femoral, femoral, popliteal, upper greater saphenous, and deep femoral veins are patent and free of thrombus. The veins are normally compressible and have normal phasic flow and augmentation response.    Right calf veins: The visualized calf veins are patent.    Contralateral CFV: The contralateral (left) common femoral vein is patent and free of thrombus.    Miscellaneous: Mild degree of scattered nonspecific subcutaneous edema.  Few nonenlarged and otherwise normal appearing lymph nodes at the right groin measuring up to 0.9 cm short axis.                                       X-Ray Femur 2 View Right (Final  "result)  Result time 07/18/25 22:01:04   Procedure changed from X-Ray Femur AP/LAT Left     Final result by Danish Latif MD (07/18/25 22:01:04)                   Impression:      See findings above.      Electronically signed by: Danish Latif MD  Date:    07/18/2025  Time:    22:01               Narrative:    EXAMINATION:  XR HIP WITH PELVIS WHEN PERFORMED 2 OR 3 VIEWS RIGHT; XR FEMUR 2 VIEW RIGHT    CLINICAL HISTORY:  Provided history is "Pain; Pain in unspecified hip"    TECHNIQUE:  AP view of the pelvis and frog leg lateral view of the right hip were performed.  Two views of the right femur.    COMPARISON:  01/07/2018.    FINDINGS:  Pelvis and right hip: Remote right-sided pubic rami fractures.  Operative changes of ORIF with mildly displaced right proximal femur fracture, potentially healing fracture.  Mild displacement of fracture fragments along the lateral aspect of the proximal femur below the greater trochanter.  No additional acute displaced fracture.  Calcification overlies the pelvis likely from fibroid.  Operative changes in the lower lumbar spine and sacrum.  Atherosclerotic vascular calcifications.    Right femur: Presumed healing fracture of the right proximal femur with operative changes of ORIF.  Suggest correlation with any prior imaging if available.  Atherosclerotic vascular calcifications.  Diffuse soft tissue edema.  Degenerative changes in the right knee.                                       X-Ray Hip 2 or 3 views Right with Pelvis when performed (Final result)  Result time 07/18/25 22:01:04   Procedure changed from X-Ray Hip 2 or 3 views Left with Pelvis when performed     Final result by Danish Latif MD (07/18/25 22:01:04)                   Impression:      See findings above.      Electronically signed by: Danish Latif MD  Date:    07/18/2025  Time:    22:01               Narrative:    EXAMINATION:  XR HIP WITH PELVIS WHEN PERFORMED 2 OR 3 VIEWS RIGHT; XR FEMUR 2 VIEW " "RIGHT    CLINICAL HISTORY:  Provided history is "Pain; Pain in unspecified hip"    TECHNIQUE:  AP view of the pelvis and frog leg lateral view of the right hip were performed.  Two views of the right femur.    COMPARISON:  01/07/2018.    FINDINGS:  Pelvis and right hip: Remote right-sided pubic rami fractures.  Operative changes of ORIF with mildly displaced right proximal femur fracture, potentially healing fracture.  Mild displacement of fracture fragments along the lateral aspect of the proximal femur below the greater trochanter.  No additional acute displaced fracture.  Calcification overlies the pelvis likely from fibroid.  Operative changes in the lower lumbar spine and sacrum.  Atherosclerotic vascular calcifications.    Right femur: Presumed healing fracture of the right proximal femur with operative changes of ORIF.  Suggest correlation with any prior imaging if available.  Atherosclerotic vascular calcifications.  Diffuse soft tissue edema.  Degenerative changes in the right knee.                                       X-Ray Chest AP Portable (Final result)  Result time 07/18/25 20:34:42      Final result by Chaitanya Kenney MD (07/18/25 20:34:42)                   Impression:      1. Interstitial findings are accentuated by habitus, superimposed edema is a consideration.  No large focal consolidation.      Electronically signed by: Chaitanya Kenney MD  Date:    07/18/2025  Time:    20:34               Narrative:    EXAMINATION:  XR CHEST AP PORTABLE    CLINICAL HISTORY:  fatigue;    TECHNIQUE:  Single frontal view of the chest was performed.    COMPARISON:  07/10/2025    FINDINGS:  The cardiomediastinal silhouette is prominent, similar to the previous exam.  There is no pleural effusion.  The trachea is deviated rightward..  The lungs are symmetrically expanded bilaterally with coarse interstitial attenuation, accentuated by habitus..  No large focal consolidation seen.  There is no pneumothorax.  The " osseous structures are remarkable for degenerative change..                                       Medications - No data to display  Medical Decision Making  70-year-old female with past medical history of  stage V chronic kidney disease, COPD, stroke with right hemiparesis, HTN, type 2 diabetes, HLD, MGUS, CAD who presents to the emergency department complaining of worsening generalized weakness over the last few days. Physical exam as above    Based on available information and the initial assessment, the working differential diagnoses considered during this evaluation include but are not limited to anemia, electrolyte abnormality, dehydration, hyper/hypoglycemia, infectious etiology, ACS, arrhythmia    Obtaining labs and imaging, will reassess    Patient signed out to Dr. Salmeron at the end of my shift with instructions to follow-up pending work-up. Patient signed out with labs/imaging pending.     Amount and/or Complexity of Data Reviewed  Labs: ordered. Decision-making details documented in ED Course.  Radiology: ordered.  ECG/medicine tests: ordered.              Attending Attestation:   Physician Attestation Statement for Resident:  As the supervising MD   Physician Attestation Statement: I have personally seen and examined this patient.   I agree with the above history.  -:   As the supervising MD I agree with the above PE.     As the supervising MD I agree with the above treatment, course, plan, and disposition.   -: See my additional documentation in the ED course. Labs reviewed. Patient has NSTEMI. No current chest pain. Concern for demand ischemia given history of anemia although Hgb here has improved since her labs earlier this week. No indication for transfusion at this time. She does also appear volume overloaded and would benefit from gentle diuresis. Imaging still pending at time of admission to . Care transitioned to oncoming team.                   ED Course as of 07/19/25 0004 Fri Jul 18, 2025    2001 CKD5 not on ESRD, COPD, HTN, DMII, HLD, CAD, CVA with right hemiparesis  [NN]   2002 Here for generalized weakness over the last few days. [NN]   2002 Currently on macrobid for UTI. No urinary sx.  [NN]   2003 No URI sx, cp, sob, lightheadedness, melena, hematochezia, abdominal pain, n/v/d. [NN]   2008 Last Hgb 3 days ago was 6.7 - it was recommended she get 1u PRBCs but patient has not yet had a transfusion.  [NN]   2035 Patient also mentions right hip and proximal thigh pain.  She has a prior history of orthopedic surgery in his area several months ago.  She states that she has had intermittent chronic pain since that time.  Will check x-rays to evaluate hardware and also obtain ultrasound to rule out DVT in that extremity.  Her legs appear symmetrically swollen.  She has 1+ pitting edema.  Abdomen is soft, nontender, nondistended.  Nonlabored.  Lungs clear.  Normal rate, regular rhythm.  Moist mucous membranes.  She is awake, alert, normal neuro exam. [NN]   2117 Hemoglobin(!): 7.4 [NN]   2117 WBC: 7.55 [NN]   2120 EKG with sinus rhythm, rate 80, no STEMI [NN]   2232 Troponin I High Sensitivity(!): 369 [NN]   2233 Creatinine(!): 2.6  Slightly up trending, baseline 2.1-2.3 [NN]   2233 Urinalysis still pending at time of admission. [NN]   2234 US still pending at time of admission   [NN]      ED Course User Index  [NN] Mallory Salmeron MD                               Clinical Impression:  Final diagnoses:  [R53.83] Fatigue  [M79.89] Leg swelling  [M25.559] Hip pain  [M25.551] Right hip pain  [I21.4] NSTEMI (non-ST elevated myocardial infarction) (Primary)  [D64.9] Anemia, unspecified type                       Nicole Meyers PA-C  07/18/25 2122       Mallory Salmeron MD  07/18/25 2316         [1]   Social History  Tobacco Use    Smoking status: Some Days     Current packs/day: 0.25     Average packs/day: 0.3 packs/day for 40.0 years (10.0 ttl pk-yrs)     Types: Cigarettes    Smokeless tobacco: Never     Tobacco comments:     smoke 5 cigs a day   Vaping Use    Vaping status: Some Days   Substance Use Topics    Alcohol use: No     Alcohol/week: 0.0 standard drinks of alcohol    Drug use: No        Mallory Salmeron MD  07/19/25 0008

## 2025-07-19 NOTE — DISCHARGE INSTRUCTIONS
Tests today showed:   Labs Reviewed   COMPREHENSIVE METABOLIC PANEL - Abnormal       Result Value    Sodium 142      Potassium 3.5      Chloride 110      CO2 22 (*)     Glucose 190 (*)     BUN 20      Creatinine 2.6 (*)     Calcium 8.4 (*)     Protein Total 7.7      Albumin 3.0 (*)     Bilirubin Total 0.7       (*)     AST 18      ALT 7 (*)     Anion Gap 10      eGFR 19 (*)    TROPONIN I HIGH SENSITIVITY - Abnormal    Troponin High Sensitive 369 (*)    B-TYPE NATRIURETIC PEPTIDE - Abnormal     (*)    CBC WITH DIFFERENTIAL - Abnormal    WBC 7.55      RBC 2.52 (*)     HGB 7.4 (*)     HCT 23.7 (*)     MCV 94      MCH 29.4      MCHC 31.2 (*)     RDW 17.0 (*)     Platelet Count 169      MPV 11.1      Nucleated RBC 0      Neut % 58.1      Lymph % 33.2      Mono % 6.0      Eos % 1.6      Basophil % 0.3      Imm Grans % 0.8 (*)     Neut # 4.39      Lymph # 2.51      Mono # 0.45      Eos # 0.12      Baso # 0.02      Imm Grans # 0.06 (*)    TSH - Abnormal    TSH 4.114 (*)    URINALYSIS, REFLEX TO URINE CULTURE - Abnormal    Color, UA Yellow      Appearance, UA Clear      pH, UA 6.0      Spec Grav UA 1.010      Protein, UA 1+ (*)     Glucose, UA 4+ (*)     Ketones, UA Negative      Bilirubin, UA Negative      Blood, UA Trace (*)     Nitrites, UA Negative      Urobilinogen, UA Negative      Leukocyte Esterase, UA Negative     TROPONIN I HIGH SENSITIVITY - Abnormal    Troponin High Sensitive 254 (*)    HEPATITIS C ANTIBODY - Normal    Hep C Ab Interp Non-Reactive     HIV 1 / 2 ANTIBODY - Normal    HIV 1/2 Ag/Ab Non-Reactive     APTT - Normal    PTT 24.0     PROTIME-INR - Normal    PT 11.2      INR 1.0     MAGNESIUM - Normal    Magnesium  1.8     PHOSPHORUS - Normal    Phosphorus Level 3.4     T4, FREE - Normal    Free T4 0.96     CBC W/ AUTO DIFFERENTIAL    Narrative:     The following orders were created for panel order CBC auto differential.  Procedure                               Abnormality         Status                      ---------                               -----------         ------                     CBC with Differential[[id number]]       Abnormal            Final result                 Please view results for these tests on the individual orders.   EXTRA TUBES    Narrative:     The following orders were created for panel order EXTRA TUBES.  Procedure                               Abnormality         Status                     ---------                               -----------         ------                     Light Green Top Hold[[id number]]                            Final result                 Please view results for these tests on the individual orders.   LIGHT GREEN TOP HOLD    Extra Tube Hold for add-ons.     URINALYSIS MICROSCOPIC    RBC, UA 3      WBC, UA 3      Bacteria, UA Rare      Yeast, UA None      Squamous Epithelial Cells, UA 1      Hyaline Casts, UA 0      Microscopic Comment       HEP C VIRUS HOLD SPECIMEN   GREY TOP URINE HOLD   TYPE & SCREEN    Specimen Outdate 07/21/2025 23:59      Group & Rh B POS      Indirect Koki NEG     PREPARE RBC SOFT    UNIT NUMBER [id number]      UNIT ABO/RH B POS      DISPENSE STATUS Selected      Unit Expiration [id number]      Product Code L3305U41      Unit Blood Type Code 7300      CROSSMATCH INTERPRETATION Compatible       US Lower Extremity Veins Right   Final Result      No evidence of deep venous thrombosis in the right lower extremity.         Electronically signed by: Minesh Caldwell MD   Date:    07/18/2025   Time:    23:58      X-Ray Femur 2 View Right   Final Result      See findings above.         Electronically signed by: Danish Latif MD   Date:    07/18/2025   Time:    22:01      X-Ray Hip 2 or 3 views Right with Pelvis when performed   Final Result      See findings above.         Electronically signed by: Danish Latif MD   Date:    07/18/2025   Time:    22:01      X-Ray Chest AP Portable   Final Result      1. Interstitial findings  are accentuated by habitus, superimposed edema is a consideration.  No large focal consolidation.         Electronically signed by: Chaitanya Kenney MD   Date:    07/18/2025   Time:    20:34          Treatments you had today:   Medications   0.9%  NaCl infusion (for blood administration) (has no administration in time range)       Follow-Up Plan:  - Follow-up with primary care doctor within 3 - 5 days  - Additional testing and/or evaluation as directed by your primary doctor    Return to the Emergency Department for symptoms including but not limited to: worsening symptoms, shortness of breath or chest pain, vomiting with inability to hold down fluids, fevers greater than 100.4°F, passing out/fainting/unconsciousness, or other concerning symptoms.

## 2025-07-19 NOTE — ED NOTES
Patient on stretcher, Bed placed in low/locked position, side rails up x 2, call light is within reach of patient or family, Patient placed into position of comfort. Patient in NAD and offers no complaints at this time. Pain goals addressed. Will continue to monitor.

## 2025-07-21 ENCOUNTER — NURSE TRIAGE (OUTPATIENT)
Dept: ADMINISTRATIVE | Facility: CLINIC | Age: 70
End: 2025-07-21
Payer: MEDICARE

## 2025-07-21 ENCOUNTER — PATIENT MESSAGE (OUTPATIENT)
Dept: HEMATOLOGY/ONCOLOGY | Facility: CLINIC | Age: 70
End: 2025-07-21
Payer: MEDICARE

## 2025-07-21 ENCOUNTER — TELEPHONE (OUTPATIENT)
Dept: HEMATOLOGY/ONCOLOGY | Facility: CLINIC | Age: 70
End: 2025-07-21
Payer: MEDICARE

## 2025-07-21 ENCOUNTER — PATIENT MESSAGE (OUTPATIENT)
Dept: CARDIOLOGY | Facility: CLINIC | Age: 70
End: 2025-07-21
Payer: MEDICARE

## 2025-07-21 ENCOUNTER — PATIENT MESSAGE (OUTPATIENT)
Dept: NEPHROLOGY | Facility: CLINIC | Age: 70
End: 2025-07-21
Payer: MEDICARE

## 2025-07-21 LAB — HOLD SPECIMEN: NORMAL

## 2025-07-21 NOTE — TELEPHONE ENCOUNTER
Pt daughter called the ER and was transferred to the on call nurse. She is currently with the patient. Pt states she received medication for her BP while in the ER and pt daughter asking what she was given so that she can provide this information to nephrologist at [facility] tomorrow.  Pt does not have symptoms at this time for triage. I was able to access pt ER encounter and provide this information to pt and daughter. No further questions at this time. No [facility] pcp to route call to.             Reason for Disposition   Follow-up information-only call to recent contact, no triage required    Protocols used: Information Only Call - No Triage-A-[place]

## 2025-07-21 NOTE — TELEPHONE ENCOUNTER
Returned call to patient and her daughter to advise  that labs and transfusion are not needed for tomorrow. Will cancel those shortly and will  with weekly labs next Tuesday    Patient and daughter verbalized understanding

## 2025-07-21 NOTE — TELEPHONE ENCOUNTER
Copied from CRM #[id number]. Topic: General Inquiry - Patient Advice  >> Jul 21, 2025  9:05 AM Vincent wrote:  Consult/Advisory     Name Of Caller: Nisreen        Contact Preference: [phone]     Nature of call: Calling because the pt has an infusion on Friday when they went to the hospital and they are trying to see if she still needs to do the infusion tomorrow.

## 2025-07-21 NOTE — TELEPHONE ENCOUNTER
Spoke to Nisreen and she stated she wanted to inform team that pt was admitted in ED on Friday for blood transfusion. She stated pt had ultrasound done and would like Geovanna to follow up if there are abnormal findings.

## 2025-08-19 ENCOUNTER — PATIENT MESSAGE (OUTPATIENT)
Dept: NEUROLOGY | Facility: CLINIC | Age: 70
End: 2025-08-19
Payer: MEDICARE

## 2025-08-20 ENCOUNTER — PATIENT MESSAGE (OUTPATIENT)
Dept: CARDIOLOGY | Facility: CLINIC | Age: 70
End: 2025-08-20
Payer: MEDICARE

## 2025-08-21 ENCOUNTER — TELEPHONE (OUTPATIENT)
Facility: CLINIC | Age: 70
End: 2025-08-21
Payer: MEDICARE

## 2025-08-21 DIAGNOSIS — R41.3 MEMORY IMPAIRMENT: Primary | ICD-10-CM

## 2025-08-21 RX ORDER — ISOSORBIDE MONONITRATE 60 MG/1
60 TABLET, EXTENDED RELEASE ORAL DAILY
Qty: 180 TABLET | Refills: 3 | Status: SHIPPED | OUTPATIENT
Start: 2025-08-21 | End: 2026-08-21

## 2025-09-05 ENCOUNTER — OFFICE VISIT (OUTPATIENT)
Dept: HEMATOLOGY/ONCOLOGY | Facility: CLINIC | Age: 70
End: 2025-09-05
Payer: MEDICARE

## 2025-09-05 VITALS
SYSTOLIC BLOOD PRESSURE: 132 MMHG | BODY MASS INDEX: 32.1 KG/M2 | HEIGHT: 64 IN | HEART RATE: 71 BPM | DIASTOLIC BLOOD PRESSURE: 75 MMHG

## 2025-09-05 DIAGNOSIS — D47.2 MGUS (MONOCLONAL GAMMOPATHY OF UNKNOWN SIGNIFICANCE): ICD-10-CM

## 2025-09-05 DIAGNOSIS — N18.4 ANEMIA DUE TO STAGE 4 CHRONIC KIDNEY DISEASE: Primary | ICD-10-CM

## 2025-09-05 DIAGNOSIS — D63.1 ANEMIA DUE TO STAGE 4 CHRONIC KIDNEY DISEASE: Primary | ICD-10-CM

## 2025-09-05 DIAGNOSIS — N18.4 CHRONIC KIDNEY DISEASE (CKD), STAGE IV (SEVERE): ICD-10-CM

## 2025-09-05 RX ORDER — FLUCONAZOLE 150 MG/1
TABLET ORAL
COMMUNITY
Start: 2025-07-14

## 2025-09-05 RX ORDER — ESTRADIOL 0.1 MG/G
CREAM VAGINAL
COMMUNITY
Start: 2025-07-14

## 2025-09-05 RX ORDER — SERTRALINE HYDROCHLORIDE 50 MG/1
50 TABLET, FILM COATED ORAL
COMMUNITY
Start: 2025-07-30

## (undated) DEVICE — BANDAGE ADHESIVE